# Patient Record
Sex: FEMALE | Race: WHITE | Employment: PART TIME | URBAN - METROPOLITAN AREA
[De-identification: names, ages, dates, MRNs, and addresses within clinical notes are randomized per-mention and may not be internally consistent; named-entity substitution may affect disease eponyms.]

---

## 2017-03-06 ENCOUNTER — TELEPHONE (OUTPATIENT)
Dept: FAMILY MEDICINE | Facility: CLINIC | Age: 72
End: 2017-03-06

## 2017-03-06 DIAGNOSIS — N95.2 POSTMENOPAUSAL ATROPHIC VAGINITIS: ICD-10-CM

## 2017-03-06 DIAGNOSIS — N95.1 SYMPTOMATIC MENOPAUSAL OR FEMALE CLIMACTERIC STATES: ICD-10-CM

## 2017-03-06 DIAGNOSIS — N95.1 SYMPTOMATIC MENOPAUSAL OR FEMALE CLIMACTERIC STATES: Primary | ICD-10-CM

## 2017-03-07 NOTE — TELEPHONE ENCOUNTER
Patient's insurance does not cover estradiol 0.5 mg patch .  They will cover estrace vaginal cream

## 2017-03-09 NOTE — TELEPHONE ENCOUNTER
This is to treat menopausal hot flashes, not atrophic vaginitis. What systemic estrogen will they cover? (does not have to be patch, could be oral)

## 2017-03-10 RX ORDER — ESTRADIOL 10 UG/1
10 INSERT VAGINAL
Qty: 8 TABLET | Refills: 11 | Status: SHIPPED | OUTPATIENT
Start: 2017-03-13 | End: 2017-03-21

## 2017-03-10 NOTE — TELEPHONE ENCOUNTER
Please notify the patient that her insurance will not cover any of the estrogen patches. I sent a prescription to [facility] for a vaginal tablet to be used twice weekly

## 2017-03-10 NOTE — TELEPHONE ENCOUNTER
[facility]'s formulary lists the following ([email or web address])  Highlighted medications appear to be the only tablet medications that do not require PA's.     ESTROGENS  ESTRACE VAGINAL CREAM 0.01 % (0.1 MG/GRAM) 3  estradiol oral tablet 0.5 mg, 1 mg, 2 mg tier 2 PA  estradiol transdermal patch semiweekly 0.025 mg/24 hr, 0.0375 mg/24 hr, 0.05 mg/24 hr, 0.075 mg/24 hr, 0.1 mg/24 hr tier 2 PA; QLL (8 EA per 28 days)  estradiol transdermal patch weekly0.025 mg/24 hr, 0.0375 mg/24 hr, 0.05 mg/24 hr, 0.06 mg/24 hr, 0.075 mg/24 hr, 0.1 mg/24 hr tier 2 PA; QLL (4 EA per 28 days)  estradiol valerate intramuscular oil 20 mg/ml tier 2   marlissa oral tablet 0.15-0.03 mg tier 2  MENEST ORAL TABLET 0.3 MG, 0.625 MG,1.25 MG tier 3 PA  yuvafem vaginal tablet 10 mcg tier 2

## 2017-03-21 RX ORDER — ESTRADIOL 0.05 MG/D
1 PATCH, EXTENDED RELEASE TRANSDERMAL WEEKLY
Qty: 12 PATCH | Refills: 3 | Status: SHIPPED | OUTPATIENT
Start: 2017-03-21 | End: 2018-10-30 | Stop reason: ALTCHOICE

## 2017-03-21 NOTE — TELEPHONE ENCOUNTER
Pt calling stating she doesn't want the vaginal tablets because they cost $250/month. She would like to go back to the estrodiol patches.    Please advise.    Karin Lane  Clinic Station

## 2017-04-13 ENCOUNTER — OFFICE VISIT (OUTPATIENT)
Dept: FAMILY MEDICINE | Facility: CLINIC | Age: 72
End: 2017-04-13
Payer: COMMERCIAL

## 2017-04-13 VITALS — SYSTOLIC BLOOD PRESSURE: 120 MMHG | HEART RATE: 64 BPM | DIASTOLIC BLOOD PRESSURE: 62 MMHG

## 2017-04-13 DIAGNOSIS — M17.11 PRIMARY OSTEOARTHRITIS OF RIGHT KNEE: Primary | ICD-10-CM

## 2017-04-13 PROCEDURE — 20610 DRAIN/INJ JOINT/BURSA W/O US: CPT | Performed by: FAMILY MEDICINE

## 2017-04-13 RX ORDER — TRIAMCINOLONE ACETONIDE 40 MG/ML
40 INJECTION, SUSPENSION INTRA-ARTICULAR; INTRAMUSCULAR ONCE
Qty: 1 ML | Refills: 0 | OUTPATIENT
Start: 2017-04-13 | End: 2017-04-13

## 2017-04-13 NOTE — MR AVS SNAPSHOT
"              After Visit Summary   2017    Camelia Bangura    MRN: [id number]           Patient Information     Date Of Birth          1945        Visit Information        Provider Department      2017 9:00 AM CORNEL Casas MD [facility]        Care Instructions      This could take up to one week to achieve the full benefit, could be repeated in a month, but generally no more than 4 times in one year          Follow-ups after your visit        Who to contact     If you have questions or need follow up information about today's clinic visit or your schedule please contact [facility] directly at [phone].  Normal or non-critical lab and imaging results will be communicated to you by MyChart, letter or phone within 4 business days after the clinic has received the results. If you do not hear from us within 7 days, please contact the clinic through MyChart or phone. If you have a critical or abnormal lab result, we will notify you by phone as soon as possible.  Submit refill requests through [employer] or call your pharmacy and they will forward the refill request to us. Please allow 3 business days for your refill to be completed.          Additional Information About Your Visit        MyChart Information     [employer] lets you send messages to your doctor, view your test results, renew your prescriptions, schedule appointments and more. To sign up, go to [email or web address] . Click on \"Log in\" on the left side of the screen, which will take you to the Welcome page. Then click on \"Sign up Now\" on the right side of the page.     You will be asked to enter the access code listed below, as well as some personal information. Please follow the directions to create your username and password.     Your access code is: [email or web address]  Expires: 2017  9:31 AM     Your access code will  in 90 days. If you need help or a new code, please call your [place] " clinic or [phone].        Care EveryWhere ID     This is your Care EveryWhere ID. This could be used by other organizations to access your [place] medical records  [id number]        Your Vitals Were     Pulse                   64            Blood Pressure from Last 3 Encounters:   04/13/17 120/62   09/08/16 122/68   06/07/16 115/71    Weight from Last 3 Encounters:   12/10/13 167 lb (75.8 kg)   09/12/12 162 lb (73.5 kg)   05/07/12 162 lb 6.4 oz (73.7 kg)              Today, you had the following     No orders found for display       Primary Care Provider Office Phone # Fax #    R Darryn Casas [phone]       [address] [address]        Thank you!     Thank you for choosing [facility]  for your care. Our goal is always to provide you with excellent care. Hearing back from our patients is one way we can continue to improve our services. Please take a few minutes to complete the written survey that you may receive in the mail after your visit with us. Thank you!             Your Updated Medication List - Protect others around you: Learn how to safely use, store and throw away your medicines at www.disposemymeds.org.          This list is accurate as of: 4/13/17  9:31 AM.  Always use your most recent med list.                   Brand Name Dispense Instructions for use    estradiol 0.05 MG/24HR BIW patch    VIVELLE-DOT    12 patch    Place 1 patch onto the skin once a week       fluconazole 150 MG tablet    DIFLUCAN    4 tablet    Take 1 tablet (150 mg) by mouth every 3 days       fluticasone 50 MCG/ACT spray    FLONASE    48 g    Spray 1-2 sprays into both nostrils daily       IBUPROFEN PO      Take 200 mg by mouth 2 tablets prn       minocycline 50 MG capsule    MINOCIN/DYNACIN    180 capsule    Take 1 capsule (50 mg) by mouth 2 times daily       simvastatin 40 MG tablet    ZOCOR    90 tablet    Take 1 tablet (40 mg) by mouth At  Bedtime       triamcinolone 0.1 % cream    KENALOG    30 g    Apply  topically 2 times daily.

## 2017-04-13 NOTE — NURSING NOTE
"Chief Complaint   Patient presents with     Knee Pain     right knee pain X ongoing. 2 weeks ago pt. moved wrong. injury when she was 14 years old and then again in college. pt. states it has been good for a long time.        Initial /62 (BP Location: Right arm, Patient Position: Chair, Cuff Size: Adult Regular)  Pulse 64 Estimated body mass index is 31.55 kg/(m^2) as calculated from the following:    Height as of 12/10/13: 5' 1\" (1.549 m).    Weight as of 12/10/13: 167 lb (75.8 kg).  Medication Reconciliation: complete     Afia Bass, CMA      "

## 2017-04-13 NOTE — PATIENT INSTRUCTIONS
This could take up to one week to achieve the full benefit, could be repeated in a month, but generally no more than 4 times in one year

## 2017-04-13 NOTE — PROGRESS NOTES
Subjective:  Camelia Bangura is a 72 year old female   Chief Complaint   Patient presents with     Knee Pain     right knee pain X ongoing. 2 weeks ago pt. moved wrong. injury when she was 14 years old and then again in college. pt. states it has been good for a long time.      Health Maintenance     pt. is due for mammgram pt. is aware.     She just turned and twisted the knee slightly and felt a sharp pain on the medial aspect of the knee joint. Since then it has been quite sore and painful with walking. She was in [facility] last week and doing quite a bit of walking and had some benefit from ibuprofen but was pretty uncomfortable. It has had swelling from time to time        Encounter Diagnosis   Name Primary?     Primary osteoarthritis of right knee Yes           Medical, surgical, social, and family histories, medications and allergies reviewed and updated.    Objective:  Exam:    GENERAL APPEARANCE ADULT: Alert, no acute distress  EYES: PERRL, EOM normal, conjunctiva and lids normal  MS: Right knee: The patella has a somewhat irregular surface. There is tenderness at both the medial and lateral joint line and some crepitation with range of motion; medial and collateral ligaments are intact to stressing, Lachman's test negative      ASSESSMENT:  1. Primary osteoarthritis of right knee    Discussed pathophysiology of this condition and implications.  Questions answered. Discussed the rationale for trying a steroid injection    PLAN:  Orders Placed This Encounter     DRAIN/INJECT LARGE JOINT/BURSA     KENALOG PER 10 MG     IBUPROFEN PO     triamcinolone acetonide (KENALOG-40) 40 MG/ML injection     After informed consent, the right knee was prepped with Hibiclens and injected using a medial approach with 5 cc of 1/2% bupivacaine and 40 mg of Kenalog. This was well tolerated.       Patient Instructions     This could take up to one week to achieve the full benefit, could be repeated in a month, but  generally no more than 4 times in one year

## 2017-08-22 ENCOUNTER — TELEPHONE (OUTPATIENT)
Dept: FAMILY MEDICINE | Facility: CLINIC | Age: 72
End: 2017-08-22

## 2017-08-22 NOTE — TELEPHONE ENCOUNTER
Panel Management Review      Patient has the following on her problem list: None      Composite cancer screening  Chart review shows that this patient is due/due soon for the following Mammogram  Summary:    Patient is due/failing the following:   MAMMOGRAM    Action needed:   Patient needs referral/order: mammogram    Type of outreach:    Phone, spoke to patient.  scheduled patient for mammogram    Questions for provider review:    None                                                                                                                                    Valery Basurto MA       Chart routed to Care Team .

## 2017-08-23 ENCOUNTER — HOSPITAL ENCOUNTER (OUTPATIENT)
Dept: MAMMOGRAPHY | Facility: CLINIC | Age: 72
Discharge: HOME | End: 2017-08-23
Attending: FAMILY MEDICINE | Admitting: FAMILY MEDICINE
Payer: COMMERCIAL

## 2017-08-23 DIAGNOSIS — Z12.31 VISIT FOR SCREENING MAMMOGRAM: ICD-10-CM

## 2017-08-23 PROCEDURE — G0202 SCR MAMMO BI INCL CAD: HCPCS

## 2017-10-03 DIAGNOSIS — E78.5 HYPERLIPIDEMIA LDL GOAL <130: ICD-10-CM

## 2017-10-03 NOTE — TELEPHONE ENCOUNTER
simvastatin     Last Written Prescription Date: 09/08/2016  Last Fill Quantity: 90, # refills: 3  Last Office Visit with [facility], P or [facility] prescribing provider: 04/13/2017       Lab Results   Component Value Date    CHOL 132 09/08/2016     Lab Results   Component Value Date    HDL 53 09/08/2016     Lab Results   Component Value Date    LDL 56 09/08/2016     Lab Results   Component Value Date    TRIG 114 09/08/2016     Lab Results   Component Value Date    CHOLHDLRATIO 3.2 12/29/2014       Pablo WeberR)

## 2017-10-05 RX ORDER — SIMVASTATIN 40 MG
40 TABLET ORAL AT BEDTIME
Qty: 30 TABLET | Refills: 0 | Status: SHIPPED | OUTPATIENT
Start: 2017-10-05 | End: 2018-01-09

## 2017-11-07 ENCOUNTER — ALLIED HEALTH/NURSE VISIT (OUTPATIENT)
Dept: FAMILY MEDICINE | Facility: CLINIC | Age: 72
End: 2017-11-07
Payer: COMMERCIAL

## 2017-11-07 DIAGNOSIS — Z23 NEED FOR PROPHYLACTIC VACCINATION AND INOCULATION AGAINST INFLUENZA: Primary | ICD-10-CM

## 2017-11-07 PROCEDURE — G0008 ADMIN INFLUENZA VIRUS VAC: HCPCS

## 2017-11-07 PROCEDURE — 99207 ZZC NO CHARGE NURSE ONLY: CPT

## 2017-11-07 PROCEDURE — 90662 IIV NO PRSV INCREASED AG IM: CPT

## 2017-11-07 NOTE — PROGRESS NOTES

## 2018-01-09 ENCOUNTER — TELEPHONE (OUTPATIENT)
Dept: FAMILY MEDICINE | Facility: CLINIC | Age: 73
End: 2018-01-09

## 2018-01-09 DIAGNOSIS — E78.5 HYPERLIPIDEMIA LDL GOAL <130: Primary | ICD-10-CM

## 2018-01-09 DIAGNOSIS — E78.5 HYPERLIPIDEMIA LDL GOAL <130: ICD-10-CM

## 2018-01-09 RX ORDER — SIMVASTATIN 40 MG
TABLET ORAL
Qty: 30 TABLET | Refills: 0 | Status: SHIPPED | OUTPATIENT
Start: 2018-01-09 | End: 2018-02-07

## 2018-01-09 NOTE — TELEPHONE ENCOUNTER
"Requested Prescriptions   Pending Prescriptions Disp Refills     simvastatin (ZOCOR) 40 MG tablet [Pharmacy Med Name: SIMVASTATIN 40MG TAB]  Last Written Prescription Date:  10/05/2017  Last Fill Quantity: 30,  # refills: 0   Last Office Visit with G, P or [facility] prescribing provider:  04/13/2017   Future Office Visit:      90 tablet      Sig: TAKE ONE TABLET AT       BEDTIME FOR CHOLESTEROL    Statins Protocol Failed    1/9/2018  2:01 PM       Failed - LDL on file in past 12 months    Recent Labs   Lab Test  09/08/16   0856   LDL  56            Passed - No abnormal creatine kinase in past 12 months    No lab results found.         Passed - Recent or future visit with authorizing provider    Patient had office visit in the last year or has a visit in the next 30 days with authorizing provider.  See \"Patient Info\" tab in inbasket, or \"Choose Columns\" in Meds & Orders section of the refill encounter.              Passed - Patient is age 18 or older       Passed - No active pregnancy on record       Passed - No positive pregnancy test in past 12 months        Pablo Ash RT (R)    "

## 2018-01-09 NOTE — TELEPHONE ENCOUNTER
Reason for Call:  Other orders    Detailed comments: Would like a lipid panel done please.    Phone Number Patient can be reached at: Cell number on file:    Telephone Information:   Mobile [phone]       Best Time: any    Can we leave a detailed message on this number? YES    Call taken on 1/9/2018 at 3:05 PM by Ofelia Ibrahim

## 2018-02-07 ENCOUNTER — ALLIED HEALTH/NURSE VISIT (OUTPATIENT)
Dept: FAMILY MEDICINE | Facility: CLINIC | Age: 73
End: 2018-02-07
Payer: COMMERCIAL

## 2018-02-07 VITALS — SYSTOLIC BLOOD PRESSURE: 123 MMHG | OXYGEN SATURATION: 98 % | DIASTOLIC BLOOD PRESSURE: 64 MMHG | HEART RATE: 73 BPM

## 2018-02-07 DIAGNOSIS — K30 INDIGESTION: Primary | ICD-10-CM

## 2018-02-07 DIAGNOSIS — E78.5 HYPERLIPIDEMIA LDL GOAL <130: ICD-10-CM

## 2018-02-07 LAB
CHOLEST SERPL-MCNC: 151 MG/DL
HDLC SERPL-MCNC: 54 MG/DL
LDLC SERPL CALC-MCNC: 66 MG/DL
NONHDLC SERPL-MCNC: 97 MG/DL
TRIGL SERPL-MCNC: 157 MG/DL

## 2018-02-07 PROCEDURE — 80061 LIPID PANEL: CPT | Performed by: FAMILY MEDICINE

## 2018-02-07 PROCEDURE — 36415 COLL VENOUS BLD VENIPUNCTURE: CPT | Performed by: FAMILY MEDICINE

## 2018-02-07 PROCEDURE — 99207 ZZC NO CHARGE NURSE ONLY: CPT

## 2018-02-07 RX ORDER — SIMVASTATIN 40 MG
TABLET ORAL
Qty: 90 TABLET | Refills: 3 | Status: SHIPPED | OUTPATIENT
Start: 2018-02-07 | End: 2018-10-30

## 2018-02-07 NOTE — MR AVS SNAPSHOT
"              After Visit Summary   2/7/2018    Camelia Bangura    MRN: [id number]           Patient Information     Date Of Birth          1945        Visit Information        Provider Department      2/7/2018 10:00 AM DENISHA MAZARIEGOS/PAWAN CURTIS [facility]        Today's Diagnoses     Indigestion    -  1       Follow-ups after your visit        Your next 10 appointments already scheduled     Feb 08, 2018  1:40 PM CST   SHORT with CORNEL Casas MD   [facility]    [address]  [facility] [id number] [phone]              Who to contact     If you have questions or need follow up information about today's clinic visit or your schedule please contact [facility] directly at [phone].  Normal or non-critical lab and imaging results will be communicated to you by MyChart, letter or phone within 4 business days after the clinic has received the results. If you do not hear from us within 7 days, please contact the clinic through MyChart or phone. If you have a critical or abnormal lab result, we will notify you by phone as soon as possible.  Submit refill requests through [employer] or call your pharmacy and they will forward the refill request to us. Please allow 3 business days for your refill to be completed.          Additional Information About Your Visit        MyChart Information     [employer] lets you send messages to your doctor, view your test results, renew your prescriptions, schedule appointments and more. To sign up, go to [email or web address] . Click on \"Log in\" on the left side of the screen, which will take you to the Welcome page. Then click on \"Sign up Now\" on the right side of the page.     You will be asked to enter the access code listed below, as well as some personal information. Please follow the directions to create your username and password.     Your access code is: [email or web address]  Expires: 5/8/2018 10:18 " AM     Your access code will  in 90 days. If you need help or a new code, please call your [place] clinic or [phone].        Care EveryWhere ID     This is your Care EveryWhere ID. This could be used by other organizations to access your [place] medical records  [id number]        Your Vitals Were     Pulse Pulse Oximetry                73 98%           Blood Pressure from Last 3 Encounters:   18 123/64   17 120/62   16 122/68    Weight from Last 3 Encounters:   12/10/13 167 lb (75.8 kg)   12 162 lb (73.5 kg)   12 162 lb 6.4 oz (73.7 kg)              Today, you had the following     No orders found for display       Primary Care Provider Office Phone # Fax #    R Darryn Casas [phone] [address] [facility] [address]        Equal Access to Services     SHAKIR DE DIOS : Hadii aad ku hadasho Soomaali, waaxda luqadaha, qaybta kaalmada adeegyada, waxharvinder masin roycen rosemary quiros . So [facility] [phone].    ATENCIÓN: Si habla español, tiene a jose disposición servicios gratuitos de asistencia lingüística. Llame al [phone].    We comply with applicable federal civil rights laws and [place] laws. We do not discriminate on the basis of race, color, national origin, age, disability, sex, sexual orientation, or gender identity.            Thank you!     Thank you for choosing [facility]  for your care. Our goal is always to provide you with excellent care. Hearing back from our patients is one way we can continue to improve our services. Please take a few minutes to complete the written survey that you may receive in the mail after your visit with us. Thank you!             Your Updated Medication List - Protect others around you: Learn how to safely use, store and throw away your medicines at www.disposemymeds.org.          This list is accurate as of 18 10:18 AM.  Always use your most recent med list.                    Brand Name Dispense Instructions for use Diagnosis    estradiol 0.05 MG/24HR BIW patch    VIVELLE-DOT    12 patch    Place 1 patch onto the skin once a week    Symptomatic menopausal or female climacteric states       fluconazole 150 MG tablet    DIFLUCAN    4 tablet    Take 1 tablet (150 mg) by mouth every 3 days    Candidiasis of vulva and vagina       fluticasone 50 MCG/ACT spray    FLONASE    48 g    Spray 1-2 sprays into both nostrils daily    Chronic rhinitis       IBUPROFEN PO      Take 200 mg by mouth 2 tablets prn        minocycline 50 MG capsule    MINOCIN/DYNACIN    180 capsule    Take 1 capsule (50 mg) by mouth 2 times daily    Perioral dermatitis       simvastatin 40 MG tablet    ZOCOR    30 tablet    TAKE ONE TABLET AT       BEDTIME FOR CHOLESTEROL    Hyperlipidemia LDL goal <130       triamcinolone 0.1 % cream    KENALOG    30 g    Apply  topically 2 times daily.    Eczema of both hands

## 2018-02-07 NOTE — NURSING NOTE
"Patient with c/o \"her guts are gurgling\".  Was fasting for labs.  Denies soa, dizziness, shakiness, cp. Last BM today.   To try something like tums, eat and maybe pepto bismo. Yodit MALDONADO RN    "

## 2018-02-07 NOTE — LETTER
[facility]  [address]  [facility] [address]  Phone: [phone]      2/8/2018     Camelia Bangura  [address] [facility] [address]      Dear Camelia:    Thank you for allowing me to participate in your care. Your recent test results were reviewed and listed below.      Your lab results are normal, continue current medications and cares. I will send refills on your cholesterol meds.   Results for orders placed or performed in visit on 02/07/18   Lipid panel reflex to direct LDL Fasting   Result Value Ref Range    Cholesterol 151 <200 mg/dL    Triglycerides 157 (H) <150 mg/dL    HDL Cholesterol 54 >49 mg/dL    LDL Cholesterol Calculated 66 <100 mg/dL    Non HDL Cholesterol 97 <130 mg/dL        Thank you for choosing [facility]. As a result, please continue with the treatment plan discussed in the office. Return as discussed or sooner if symptoms worsen or fail to improve. If you have any further questions or concerns, please do not hesitate to contact us.      Sincerely,        Dr. Darryn Casas/Tamar Curry CMA

## 2018-02-08 NOTE — PROGRESS NOTES
Please SEND LETTER    Notify patient of acceptable results. Continue current medications and cares. I will send refills on your cholesterol meds

## 2018-03-14 ENCOUNTER — TELEPHONE (OUTPATIENT)
Dept: FAMILY MEDICINE | Facility: CLINIC | Age: 73
End: 2018-03-14

## 2018-03-14 DIAGNOSIS — Z12.11 SPECIAL SCREENING FOR MALIGNANT NEOPLASMS, COLON: Primary | ICD-10-CM

## 2018-03-14 NOTE — TELEPHONE ENCOUNTER
Panel Management Review      Patient has the following on her problem list: None      Composite cancer screening  Chart review shows that this patient is due/due soon for the following colon screening  Summary:    Patient is due/failing the following:   Colon screening     Action needed:   Patient needs referral/order: colon/fit    Type of outreach:    Phone, spoke to patient.  agreed to fit    Questions for provider review:    None                                                                                                                                    Valery Basurto MA       Chart routed to Care Team .

## 2018-03-21 PROCEDURE — 82274 ASSAY TEST FOR BLOOD FECAL: CPT | Performed by: FAMILY MEDICINE

## 2018-03-25 LAB — HEMOCCULT STL QL IA: NEGATIVE

## 2018-03-26 DIAGNOSIS — Z12.11 SPECIAL SCREENING FOR MALIGNANT NEOPLASMS, COLON: ICD-10-CM

## 2018-03-26 NOTE — LETTER
[facility]  [address]  [facility] [address]  Phone: [phone]      3/26/2018     Camelia Bangura  [address] [facility] [address]      Dear Camelia:    Thank you for allowing me to participate in your care. Your recent test results were reviewed and listed below.  NORMAL results. Please repeat this test in one year    Your results are provided below for your review  Results for orders placed or performed in visit on 03/26/18   Fecal colorectal cancer screen (FIT)   Result Value Ref Range    Occult Blood Scn FIT Negative NEG^Negative                 Thank you for choosing [facility]. As a result, please continue with the treatment plan discussed in the office. Return as discussed or sooner if symptoms worsen or fail to improve. If you have any further questions or concerns, please do not hesitate to contact us.      Sincerely,        Dr. Darryn Casas

## 2018-04-09 DIAGNOSIS — N95.1 SYMPTOMATIC MENOPAUSAL OR FEMALE CLIMACTERIC STATES: Primary | ICD-10-CM

## 2018-04-09 NOTE — TELEPHONE ENCOUNTER
"Requested Prescriptions   Pending Prescriptions Disp Refills     estradiol (CLIMARA) 0.05 MG/24HR WK patch [Pharmacy Med Name: ESTRADIOL 0.05MG DIS]  Last Written Prescription Date:  03/21/2017  Last Fill Quantity: 12,  # refills: 3   Last office visit: 04/13/2017 with prescribing provider:  Post   Future Office Visit:     4 patch 8     Sig: APPLY ONE PATCH TO SKIN  ONCE A WEEK    Hormone Replacement Therapy Passed    4/9/2018 12:29 PM       Passed - Blood pressure under 140/90 in past 12 months    BP Readings from Last 3 Encounters:   02/07/18 123/64   04/13/17 120/62   09/08/16 122/68                Passed - Recent (12 mo) or future (30 days) visit within the authorizing provider's specialty    Patient had office visit in the last 12 months or has a visit in the next 30 days with authorizing provider or within the authorizing provider's specialty.  See \"Patient Info\" tab in inbasket, or \"Choose Columns\" in Meds & Orders section of the refill encounter.           Passed - Patient has mammogram in past 2 years on file if age 50-75       Passed - Patient is 18 years of age or older       Passed - No active pregnancy on record       Passed - No positive pregnancy test on record in past 12 months        Pablo Ash RT (R)    "

## 2018-04-10 ENCOUNTER — TELEPHONE (OUTPATIENT)
Dept: FAMILY MEDICINE | Facility: CLINIC | Age: 73
End: 2018-04-10

## 2018-04-11 RX ORDER — ESTRADIOL 0.05 MG/D
PATCH TRANSDERMAL
Qty: 4 PATCH | Refills: 8 | Status: SHIPPED | OUTPATIENT
Start: 2018-04-11 | End: 2018-10-30

## 2018-04-11 NOTE — TELEPHONE ENCOUNTER
**This refill requires provider completion and is not appropriate for RN review per RN refill guidelines.**  Please associate appropriate diagnosis to this medication.  Harriett King RN

## 2018-04-11 NOTE — TELEPHONE ENCOUNTER
PA submitted to [facility] PA POOL 4/10/18 by Trang Valdez    Prior Authorization Retail Medication Request    Medication/Dose: Estradiol TDS 0.05mg/day  ICD code (if different than what is on RX):    Previously Tried and Failed:  Climara patch  Rationale:  Patient requested patch and has paid out of pocket before;  Has used since 11/2010    Insurance Name:  [facility]  Insurance ID:  [id number]   Temporary Fill letter received      Pharmacy Information (if different than what is on RX)  Name:    Phone:

## 2018-04-16 NOTE — TELEPHONE ENCOUNTER
PA Initiation    Medication: estradiol tds   Insurance Company: [employer] - Phone [phone] Fax [phone]  Pharmacy Filling the Rx: [facility] - [place] - [address]  Filling Pharmacy Phone: [phone]  Filling Pharmacy Fax:    Start Date: 4/16/2018    Central Prior Authorization Team   Phone: [phone]    Temporary Fill letter received by Clinic        Awaiting additional questions via CMM

## 2018-04-17 NOTE — TELEPHONE ENCOUNTER
Prior Authorization Approval    Authorization Effective Date: 3/17/2018  Authorization Expiration Date: 4/16/2019  Medication: estradiol tds   Approved Dose/Quantity: 4/28 days  Reference #: [id number]   Insurance Company: [employer] PAULA - Phone [phone] Fax [phone]  Expected CoPay: n/a per pharmacist she has a deductible not met yet, they will call her once it is ready     Which Pharmacy is filling the prescription (Not needed for infusion/clinic administered): [facility] - [place], [place] - [address] [place] [address]  Pharmacy Notified: Yes  Patient Notified: Yes

## 2018-04-26 ENCOUNTER — TELEPHONE (OUTPATIENT)
Dept: FAMILY MEDICINE | Facility: CLINIC | Age: 73
End: 2018-04-26

## 2018-04-26 NOTE — TELEPHONE ENCOUNTER
Reason for Call:  ears    Detailed comments: patient is calling stating that her left ear she can hardly hear from that ear, and the right one is not much better . She did mention something about the last time she saw Dr. Casas  that she should get her ears flushed. Please call to advise    Phone Number Patient can be reached at: Cell number on file:    Telephone Information:   Mobile [phone]     Best Time: any    Can we leave a detailed message on this number? YES   Bharati Velazquez  Clinic Station [place] [facility]      Call taken on 4/26/2018 at 10:59 AM by Bharati Velazquez

## 2018-04-27 ENCOUNTER — OFFICE VISIT (OUTPATIENT)
Dept: FAMILY MEDICINE | Facility: CLINIC | Age: 73
End: 2018-04-27
Payer: COMMERCIAL

## 2018-04-27 VITALS — SYSTOLIC BLOOD PRESSURE: 134 MMHG | TEMPERATURE: 98.2 F | HEART RATE: 96 BPM | DIASTOLIC BLOOD PRESSURE: 56 MMHG

## 2018-04-27 DIAGNOSIS — H61.23 EXCESSIVE EAR WAX, BILATERAL: Primary | ICD-10-CM

## 2018-04-27 PROCEDURE — 69209 REMOVE IMPACTED EAR WAX UNI: CPT | Mod: 50 | Performed by: FAMILY MEDICINE

## 2018-04-27 NOTE — NURSING NOTE
"Initial /56 (BP Location: Right leg, Patient Position: Chair, Cuff Size: Adult Large)  Pulse 96  Temp 98.2  F (36.8  C) (Tympanic) Estimated body mass index is 31.55 kg/(m^2) as calculated from the following:    Height as of 12/10/13: 5' 1\" (1.549 m).    Weight as of 12/10/13: 167 lb (75.8 kg). .    Eloise Anne    "

## 2018-04-27 NOTE — MR AVS SNAPSHOT
"              After Visit Summary   4/27/2018    Camelia Bangura    MRN: [id number]           Patient Information     Date Of Birth          1945        Visit Information        Provider Department      4/27/2018 9:40 AM Tracey Pitts MD [facility]         Follow-ups after your visit        Your next 10 appointments already scheduled     May 10, 2018  9:20 AM CDT   SHORT with CORNEL Casas MD   [facility] ([facility])    [id number]  [facility] [id number] [phone]              Who to contact     If you have questions or need follow up information about today's clinic visit or your schedule please contact [facility] directly at [phone].  Normal or non-critical lab and imaging results will be communicated to you by MyChart, letter or phone within 4 business days after the clinic has received the results. If you do not hear from us within 7 days, please contact the clinic through MyChart or phone. If you have a critical or abnormal lab result, we will notify you by phone as soon as possible.  Submit refill requests through [employer] or call your pharmacy and they will forward the refill request to us. Please allow 3 business days for your refill to be completed.          Additional Information About Your Visit        MyChart Information     RoboDynamics lets you send messages to your doctor, view your test results, renew your prescriptions, schedule appointments and more. To sign up, go to [email or web address] . Click on \"Log in\" on the left side of the screen, which will take you to the Welcome page. Then click on \"Sign up Now\" on the right side of the page.     You will be asked to enter the access code listed below, as well as some personal information. Please follow the directions to create your username and password.     Your access code is: [email or web address]  Expires: 5/8/2018 11:18 AM     Your access code will "  in 90 days. If you need help or a new code, please call your [place] clinic or [phone].        Care EveryWhere ID     This is your Care EveryWhere ID. This could be used by other organizations to access your [place] medical records  [id number]        Your Vitals Were     Pulse Temperature                96 98.2  F (36.8  C) (Tympanic)           Blood Pressure from Last 3 Encounters:   18 134/56   18 123/64   17 120/62    Weight from Last 3 Encounters:   12/10/13 167 lb (75.8 kg)   12 162 lb (73.5 kg)   12 162 lb 6.4 oz (73.7 kg)              Today, you had the following     No orders found for display       Primary Care Provider Office Phone # Fax #    R Darryn Casas [phone] [address] [address]        Equal Access to Services     SHAKIR DE DIOS AH: Hadii aad ku hadasho Soomaali, waaxda luqadaha, qaybta kaalmada adeegyada, waxay tgin hayyocastan rosemary quiros . So [facility] [phone].    ATENCIÓN: Si lamla espwilma, tiene a jose disposición servicios gratuitos de asistencia lingüística. Llame al [phone].    We comply with applicable federal civil rights laws and [place] laws. We do not discriminate on the basis of race, color, national origin, age, disability, sex, sexual orientation, or gender identity.            Thank you!     Thank you for choosing [facility]  for your care. Our goal is always to provide you with excellent care. Hearing back from our patients is one way we can continue to improve our services. Please take a few minutes to complete the written survey that you may receive in the mail after your visit with us. Thank you!             Your Updated Medication List - Protect others around you: Learn how to safely use, store and throw away your medicines at www.disposemymeds.org.          This list is accurate as of 18 10:29 AM.  Always use your most recent med list.                   Brand  Name Dispense Instructions for use Diagnosis    * estradiol 0.05 MG/24HR BIW patch    VIVELLE-DOT    12 patch    Place 1 patch onto the skin once a week    Symptomatic menopausal or female climacteric states       * estradiol 0.05 MG/24HR WK patch    CLIMARA    4 patch    APPLY ONE PATCH TO SKIN  ONCE A WEEK    Symptomatic menopausal or female climacteric states       fluconazole 150 MG tablet    DIFLUCAN    4 tablet    Take 1 tablet (150 mg) by mouth every 3 days    Candidiasis of vulva and vagina       fluticasone 50 MCG/ACT spray    FLONASE    48 g    Spray 1-2 sprays into both nostrils daily    Chronic rhinitis       IBUPROFEN PO      Take 200 mg by mouth 2 tablets prn        minocycline 50 MG capsule    MINOCIN/DYNACIN    180 capsule    Take 1 capsule (50 mg) by mouth 2 times daily    Perioral dermatitis       simvastatin 40 MG tablet    ZOCOR    90 tablet    TAKE ONE TABLET AT       BEDTIME FOR CHOLESTEROL    Hyperlipidemia LDL goal <130       triamcinolone 0.1 % cream    KENALOG    30 g    Apply  topically 2 times daily.    Eczema of both hands       * Notice:  This list has 2 medication(s) that are the same as other medications prescribed for you. Read the directions carefully, and ask your doctor or other care provider to review them with you.

## 2018-04-27 NOTE — PROGRESS NOTES
SUBJECTIVE:   Camelia Bangura is a 73 year old female who presents to clinic today for the following health issues:        ENT Symptoms             Symptoms: cc Present Absent Comment   Fever/Chills   x    Fatigue   x    Muscle Aches   x    Eye Irritation   x    Sneezing   x    Nasal Sharath/Drg   x    Sinus Pressure/Pain   x    Loss of smell   x    Dental pain   x    Sore Throat   x    Swollen Glands   x    Ear Pain/Fullness  x  plugged   Cough   x    Wheeze   x    Chest Pain   x    Shortness of breath   x    Rash   x    Other   x      Symptom duration:  one week or more   Symptom severity:  moderate   Treatments tried:  Debrox   Contacts:   none         Patient is a 73 yr old female here for plugged ears. Has had this for a couple of weeks. Reports no pain but hearing has reduced. Builds up wax easily. No other symptoms. No dizziness or headaches.     Problem list and histories reviewed & adjusted, as indicated.  Additional history: as documented    Patient Active Problem List   Diagnosis     Dysplasia of cervix     Symptomatic menopausal or female climacteric states     Postmenopausal atrophic vaginitis     Candidiasis of vulva and vagina     HYPERLIPIDEMIA LDL GOAL <130     Chronic rhinitis     Health Care Home     Advanced directives, counseling/discussion     Eczema of both hands     Past Surgical History:   Procedure Laterality Date     C APPENDECTOMY       HC CORRECT BUNION,SIMPLE  05/23/90    Right foot     HC REMOVAL OF TONSILS,<13 Y/O       HYSTERECTOMY, PAP NO LONGER INDICATED  4/05     HYSTERECTOMY, VAGINAL  4/05     SURGICAL HISTORY OF -   06/19/95    Bunion deformity & fractured tibial sesamoid left foot       Social History   Substance Use Topics     Smoking status: Never Smoker     Smokeless tobacco: Never Used     Alcohol use Yes      Comment: occasional wine     Family History   Problem Relation Age of Onset     CEREBROVASCULAR DISEASE Mother      Arthritis Mother      OSTEOPOROSIS Mother       Melanoma No family hx of          Current Outpatient Prescriptions   Medication Sig Dispense Refill     estradiol (CLIMARA) 0.05 MG/24HR WK patch APPLY ONE PATCH TO SKIN  ONCE A WEEK 4 patch 8     estradiol (VIVELLE-DOT) 0.05 MG/24HR BIW patch Place 1 patch onto the skin once a week 12 patch 3     simvastatin (ZOCOR) 40 MG tablet TAKE ONE TABLET AT       BEDTIME FOR CHOLESTEROL 90 tablet 3     fluconazole (DIFLUCAN) 150 MG tablet Take 1 tablet (150 mg) by mouth every 3 days (Patient not taking: Reported on 4/27/2018) 4 tablet 6     fluticasone (FLONASE) 50 MCG/ACT nasal spray Spray 1-2 sprays into both nostrils daily (Patient not taking: Reported on 4/27/2018) 48 g 0     IBUPROFEN PO Take 200 mg by mouth 2 tablets prn       minocycline (MINOCIN,DYNACIN) 50 MG capsule Take 1 capsule (50 mg) by mouth 2 times daily (Patient not taking: Reported on 4/27/2018) 180 capsule 3     triamcinolone (KENALOG) 0.1 % cream Apply  topically 2 times daily. (Patient not taking: Reported on 4/27/2018) 30 g 11     Allergies   Allergen Reactions     Seasonal Allergies      BP Readings from Last 3 Encounters:   04/27/18 134/56   02/07/18 123/64   04/13/17 120/62    Wt Readings from Last 3 Encounters:   12/10/13 167 lb (75.8 kg)   09/12/12 162 lb (73.5 kg)   05/07/12 162 lb 6.4 oz (73.7 kg)                  Labs reviewed in EPIC    Reviewed and updated as needed this visit by clinical staff       Reviewed and updated as needed this visit by Provider         ROS:  Constitutional, HEENT, cardiovascular, pulmonary, gi and gu systems are negative, except as otherwise noted.    OBJECTIVE:     /56 (BP Location: Right leg, Patient Position: Chair, Cuff Size: Adult Large)  Pulse 96  Temp 98.2  F (36.8  C) (Tympanic)  There is no height or weight on file to calculate BMI.  GENERAL: healthy, alert and no distress  HENT: normal cephalic/atraumatic, both ears: occluded with wax, nose and mouth without ulcers or lesions, oropharynx clear and  oral mucous membranes moist    Diagnostic Test Results:  none     ASSESSMENT/PLAN:   1. Excessive ear wax, bilateral  Irrigated , no signs of infection. Recommend using debrox every couple of weeks.       FUTURE APPOINTMENTS:       - Follow-up visit as needed    Tracey Pitts MD  [facility]

## 2018-05-03 DIAGNOSIS — J31.0 CHRONIC RHINITIS: Primary | ICD-10-CM

## 2018-05-03 RX ORDER — FLUTICASONE PROPIONATE 50 MCG
1-2 SPRAY, SUSPENSION (ML) NASAL DAILY
Qty: 48 G | Refills: 0 | Status: SHIPPED | OUTPATIENT
Start: 2018-05-03 | End: 2018-10-30

## 2018-05-03 NOTE — TELEPHONE ENCOUNTER
Fluticasone  Last Written Prescription Date:  9/8/2016  Last Fill Quantity: 48 g,  # refills: 0   Last office visit: No previous visit found with prescribing provider:  4/27/2018 OA   Future Office Visit:

## 2018-05-11 NOTE — MR AVS SNAPSHOT
"              After Visit Summary   2017    Camelia Bangura    MRN: [id number]           Patient Information     Date Of Birth          1945        Visit Information        Provider Department      2017 11:45 AM [facility]        Today's Diagnoses     Need for prophylactic vaccination and inoculation against influenza    -  1       Follow-ups after your visit        Who to contact     If you have questions or need follow up information about today's clinic visit or your schedule please contact [facility] directly at [phone].  Normal or non-critical lab and imaging results will be communicated to you by [employer], letter or phone within 4 business days after the clinic has received the results. If you do not hear from us within 7 days, please contact the clinic through [employer] or phone. If you have a critical or abnormal lab result, we will notify you by phone as soon as possible.  Submit refill requests through [employer] or call your pharmacy and they will forward the refill request to us. Please allow 3 business days for your refill to be completed.          Additional Information About Your Visit        MyChart Information     LiveLeaf lets you send messages to your doctor, view your test results, renew your prescriptions, schedule appointments and more. To sign up, go to [email or web address] . Click on \"Log in\" on the left side of the screen, which will take you to the Welcome page. Then click on \"Sign up Now\" on the right side of the page.     You will be asked to enter the access code listed below, as well as some personal information. Please follow the directions to create your username and password.     Your access code is: [email or web address]  Expires: 2018 11:52 AM     Your access code will  in 90 days. If you need help or a new code, please call your [facility] or [phone].        Care EveryWhere ID     This is your Care " EveryWhere ID. This could be used by other organizations to access your [place] medical records  [id number]         Blood Pressure from Last 3 Encounters:   04/13/17 120/62   09/08/16 122/68   06/07/16 115/71    Weight from Last 3 Encounters:   12/10/13 167 lb (75.8 kg)   09/12/12 162 lb (73.5 kg)   05/07/12 162 lb 6.4 oz (73.7 kg)              We Performed the Following     ADMIN INFLUENZA (For MEDICARE Patients ONLY) []     FLU VACCINE, INCREASED ANTIGEN, PRESV FREE, AGE 65+ [56029]        Primary Care Provider Office Phone # Fax #    R Darryn Casas [phone] [address] [id number]        Equal Access to Services     SHAKIR DE DIOS : Hadii aad ku hadasho Soomaali, waaxda luqadaha, qaybta kaalmada adeegyada, david quiros . So [facility] [phone].    ATENCIÓN: Si habla español, tiene a jose disposición servicios gratuitos de asistencia lingüística. Llame al [phone].    We comply with applicable federal civil rights laws and [place] laws. We do not discriminate on the basis of race, color, national origin, age, disability, sex, sexual orientation, or gender identity.            Thank you!     Thank you for choosing [facility]  for your care. Our goal is always to provide you with excellent care. Hearing back from our patients is one way we can continue to improve our services. Please take a few minutes to complete the written survey that you may receive in the mail after your visit with us. Thank you!             Your Updated Medication List - Protect others around you: Learn how to safely use, store and throw away your medicines at www.disposemymeds.org.          This list is accurate as of: 11/7/17 11:52 AM.  Always use your most recent med list.                   Brand Name Dispense Instructions for use Diagnosis    estradiol 0.05 MG/24HR BIW patch    VIVELLE-DOT    12 patch    Place 1 patch onto the skin once a week     Symptomatic menopausal or female climacteric states       fluconazole 150 MG tablet    DIFLUCAN    4 tablet    Take 1 tablet (150 mg) by mouth every 3 days    Candidiasis of vulva and vagina       fluticasone 50 MCG/ACT spray    FLONASE    48 g    Spray 1-2 sprays into both nostrils daily    Chronic rhinitis       IBUPROFEN PO      Take 200 mg by mouth 2 tablets prn        minocycline 50 MG capsule    MINOCIN/DYNACIN    180 capsule    Take 1 capsule (50 mg) by mouth 2 times daily    Perioral dermatitis       simvastatin 40 MG tablet    ZOCOR    30 tablet    Take 1 tablet (40 mg) by mouth At Bedtime (Needs follow-up appointment for this medication)    Hyperlipidemia LDL goal <130       triamcinolone 0.1 % cream    KENALOG    30 g    Apply  topically 2 times daily.    Eczema of both hands          How Severe Is It?: moderate Is This A New Presentation, Or A Follow-Up?: Evaluation for Isotretinoin Females Only: When Was Your Last Menstrual Period?: 04/13/2018

## 2018-07-30 ENCOUNTER — OFFICE VISIT (OUTPATIENT)
Dept: FAMILY MEDICINE | Facility: CLINIC | Age: 73
End: 2018-07-30
Payer: COMMERCIAL

## 2018-07-30 VITALS
DIASTOLIC BLOOD PRESSURE: 70 MMHG | SYSTOLIC BLOOD PRESSURE: 126 MMHG | RESPIRATION RATE: 16 BRPM | TEMPERATURE: 98.5 F | HEART RATE: 84 BPM

## 2018-07-30 DIAGNOSIS — D18.00 BENIGN HEMANGIOMA: Primary | ICD-10-CM

## 2018-07-30 DIAGNOSIS — B00.9 HERPES SIMPLEX VIRUS INFECTION: ICD-10-CM

## 2018-07-30 DIAGNOSIS — Z12.83 SCREENING FOR SKIN CANCER: ICD-10-CM

## 2018-07-30 PROCEDURE — 11301 SHAVE SKIN LESION 0.6-1.0 CM: CPT | Performed by: FAMILY MEDICINE

## 2018-07-30 PROCEDURE — 99213 OFFICE O/P EST LOW 20 MIN: CPT | Mod: 25 | Performed by: FAMILY MEDICINE

## 2018-07-30 RX ORDER — ACYCLOVIR 800 MG/1
800 TABLET ORAL
Qty: 35 TABLET | Refills: 0 | Status: SHIPPED | OUTPATIENT
Start: 2018-07-30 | End: 2018-10-30

## 2018-07-30 NOTE — MR AVS SNAPSHOT
After Visit Summary   7/30/2018    Camelia Bangura    MRN: [id number]           Patient Information     Date Of Birth          1945        Visit Information        Provider Department      7/30/2018 3:40 PM Augusto, CORNEL Cates MD [facility]        Today's Diagnoses     Screening for skin cancer    -  1    Herpes simplex virus infection           Follow-ups after your visit        Additional Services     DERMATOLOGY REFERRAL       Your provider has referred you to: [facility]: [facility] [phone]   [email or web address]    Please be aware that coverage of these services is subject to the terms and limitations of your health insurance plan.  Call member services at your health plan with any benefit or coverage questions.      Please bring the following with you to your appointment:    (1) Any X-Rays, CTs or MRIs which have been performed.  Contact the facility where they were done to arrange for  prior to your scheduled appointment.    (2) List of current medications  (3) This referral request   (4) Any documents/labs given to you for this referral                  Who to contact     If you have questions or need follow up information about today's clinic visit or your schedule please contact [facility] directly at [phone].  Normal or non-critical lab and imaging results will be communicated to you by MyChart, letter or phone within 4 business days after the clinic has received the results. If you do not hear from us within 7 days, please contact the clinic through MyChart or phone. If you have a critical or abnormal lab result, we will notify you by phone as soon as possible.  Submit refill requests through [employer] or call your pharmacy and they will forward the refill request to us. Please allow 3 business days for your refill to be completed.          Additional  "Information About Your Visit        MyChart Information     YES.TAP lets you send messages to your doctor, view your test results, renew your prescriptions, schedule appointments and more. To sign up, go to [email or web address] . Click on \"Log in\" on the left side of the screen, which will take you to the Welcome page. Then click on \"Sign up Now\" on the right side of the page.     You will be asked to enter the access code listed below, as well as some personal information. Please follow the directions to create your username and password.     Your access code is: [email or web address]  Expires: 10/28/2018  3:38 PM     Your access code will  in 90 days. If you need help or a new code, please call your [place] clinic or [phone].        Care EveryWhere ID     This is your Care EveryWhere ID. This could be used by other organizations to access your [place] medical records  [id number]        Your Vitals Were     Pulse Temperature Respirations             84 98.5  F (36.9  C) (Tympanic) 16          Blood Pressure from Last 3 Encounters:   18 126/70   18 134/56   18 123/64    Weight from Last 3 Encounters:   12/10/13 167 lb (75.8 kg)   12 162 lb (73.5 kg)   12 162 lb 6.4 oz (73.7 kg)              We Performed the Following     DERMATOLOGY REFERRAL          Today's Medication Changes          These changes are accurate as of 18  4:14 PM.  If you have any questions, ask your nurse or doctor.               Start taking these medicines.        Dose/Directions    acyclovir 800 MG tablet   Commonly known as:  ZOVIRAX   Used for:  Herpes simplex virus infection   Started by:  CORNEL Casas MD        Dose:  800 mg   Take 1 tablet (800 mg) by mouth 5 times daily   Quantity:  35 tablet   Refills:  0            Where to get your medicines      Some of these will need a paper prescription and others can be bought over the counter.  Ask your nurse if you have questions.     Bring a paper " prescription for each of these medications     acyclovir 800 MG tablet                Primary Care Provider Office Phone # Fax #    R Darryn Casas [phone] [address] [facility] [address]        Equal Access to Services     SHAKIR DE DIOS : Hadii junie whitley ryano Soomaali, waaxda luqadaha, qaybta kaalmada adeliliada, david cranen rosemary tony laAnjumporsha watson. So [facility] [phone].    ATENCIÓN: Si habla español, tiene a jose disposición servicios gratuitos de asistencia lingüística. Llame al [phone].    We comply with applicable federal civil rights laws and [place] laws. We do not discriminate on the basis of race, color, national origin, age, disability, sex, sexual orientation, or gender identity.            Thank you!     Thank you for choosing [facility]  for your care. Our goal is always to provide you with excellent care. Hearing back from our patients is one way we can continue to improve our services. Please take a few minutes to complete the written survey that you may receive in the mail after your visit with us. Thank you!             Your Updated Medication List - Protect others around you: Learn how to safely use, store and throw away your medicines at www.disposemymeds.org.          This list is accurate as of 7/30/18  4:14 PM.  Always use your most recent med list.                   Brand Name Dispense Instructions for use Diagnosis    acyclovir 800 MG tablet    ZOVIRAX    35 tablet    Take 1 tablet (800 mg) by mouth 5 times daily    Herpes simplex virus infection       * estradiol 0.05 MG/24HR BIW patch    VIVELLE-DOT    12 patch    Place 1 patch onto the skin once a week    Symptomatic menopausal or female climacteric states       * estradiol 0.05 MG/24HR WK patch    CLIMARA    4 patch    APPLY ONE PATCH TO SKIN  ONCE A WEEK    Symptomatic menopausal or female climacteric states       fluconazole 150 MG tablet    DIFLUCAN    4 tablet    Take 1  tablet (150 mg) by mouth every 3 days    Candidiasis of vulva and vagina       fluticasone 50 MCG/ACT spray    FLONASE    48 g    Spray 1-2 sprays into both nostrils daily    Chronic rhinitis       IBUPROFEN PO      Take 200 mg by mouth 2 tablets prn        simvastatin 40 MG tablet    ZOCOR    90 tablet    TAKE ONE TABLET AT       BEDTIME FOR CHOLESTEROL    Hyperlipidemia LDL goal <130       triamcinolone 0.1 % cream    KENALOG    30 g    Apply  topically 2 times daily.    Eczema of both hands       * Notice:  This list has 2 medication(s) that are the same as other medications prescribed for you. Read the directions carefully, and ask your doctor or other care provider to review them with you.

## 2018-07-30 NOTE — PROGRESS NOTES
SUBJECTIVE:   Camelia Bangura is a 73 year old female who presents to clinic today for the following health issues:    Concern - Spot on arm  Onset: 3 years or so    Description:   Back round spot on left shoulder. A little bigger than a pea. Patient states she has nicked it a few time and it hurts. Patient would like it removed.      Progression of Symptoms:  Slightly worsened- has got bigger    Accompanying Signs & Symptoms:  no    Therapies Tried and outcome: nothing    Patient would also like to discuss adult acne. No sure if that's what it is but it is bothering her. she does have a history of cold sores on her lips off and on.  Recently had a group of vesicles/papules on the left side of the face just under her eye.  This lasted about 3 weeks and then resolved    Problem list and histories reviewed & adjusted, as indicated.  Additional history: She had shown me the lesion on her right posterior arm before and was reassured it was a benign hemangioma.  However it does get irritated and uncomfortable at times and therefore she would like that        Reviewed and updated as nee ded this visit by clinical staff       Reviewed and updated as needed this visit by Provider                     OBJECTIVE:                                                    /70  Pulse 84  Temp 98.5  F (36.9  C) (Tympanic)  Resp 16    GENERAL: healthy, alert and no distress  EYES: Eyes grossly normal to inspection, extraocular movements - intact, and PERRL  SKIN: Purple papule on the right posterior arm, shoulder region, 6 mm in diameter and raised with distinct uniform edges.    Some mild residual erythema under the left eye with some minimal papules consistent with resolving herpes simplex infection       ASSESSMENT/PLAN:                                                    ASSESSMENT:  1. Benign hemangioma    2. Herpes simplex virus infection    3. Screening for skin cancer    It sounds like the skin reaction on her left  face was a herpes simplex infection.  We will give her a prescription to use in the future if it recurs. The hemangioma on her Right upper arm is benign, but removal is justified since it is bothering her and will catch on her clothing    PLAN:  Orders Placed This Encounter     SHAV SKIN LESION TRUNK/ARM/LEG 0.6-1.0 CM     DERMATOLOGY REFERRAL     acyclovir (ZOVIRAX) 800 MG tablet     The skin was prepped with Hibiclens, anesthetized with 1% lidocaine with epi, and then a tangential shave excision was performed.  There was an obvious hemangioma which was cauterized at the base to control the bleeding.  No specimen was sent for exam.  Wound care was discussed    CORNEL Casas  [facility]

## 2018-09-12 ENCOUNTER — HOSPITAL ENCOUNTER (OUTPATIENT)
Dept: MAMMOGRAPHY | Facility: CLINIC | Age: 73
Discharge: HOME | End: 2018-09-12
Attending: FAMILY MEDICINE | Admitting: FAMILY MEDICINE
Payer: COMMERCIAL

## 2018-09-12 ENCOUNTER — ALLIED HEALTH/NURSE VISIT (OUTPATIENT)
Dept: FAMILY MEDICINE | Facility: CLINIC | Age: 73
End: 2018-09-12
Payer: COMMERCIAL

## 2018-09-12 DIAGNOSIS — Z12.31 VISIT FOR SCREENING MAMMOGRAM: ICD-10-CM

## 2018-09-12 DIAGNOSIS — Z23 NEED FOR PROPHYLACTIC VACCINATION AND INOCULATION AGAINST INFLUENZA: Primary | ICD-10-CM

## 2018-09-12 PROCEDURE — 90662 IIV NO PRSV INCREASED AG IM: CPT

## 2018-09-12 PROCEDURE — G0008 ADMIN INFLUENZA VIRUS VAC: HCPCS

## 2018-09-12 PROCEDURE — 77067 SCR MAMMO BI INCL CAD: CPT

## 2018-09-12 NOTE — MR AVS SNAPSHOT
After Visit Summary   9/12/2018    Camelia Bangura    MRN: [id number]           Patient Information     Date Of Birth          1945        Visit Information        Provider Department      9/12/2018 10:00 AM [facility]        Today's Diagnoses     Need for prophylactic vaccination and inoculation against influenza    -  1       Follow-ups after your visit        Who to contact     If you have questions or need follow up information about today's clinic visit or your schedule please contact [facility] directly at [phone].  Normal or non-critical lab and imaging results will be communicated to you by MyChart, letter or phone within 4 business days after the clinic has received the results. If you do not hear from us within 7 days, please contact the clinic through MyChart or phone. If you have a critical or abnormal lab result, we will notify you by phone as soon as possible.  Submit refill requests through [employer] or call your pharmacy and they will forward the refill request to us. Please allow 3 business days for your refill to be completed.          Additional Information About Your Visit        Care EveryWhere ID     This is your Care EveryWhere ID. This could be used by other organizations to access your [place] medical records  [id number]         Blood Pressure from Last 3 Encounters:   07/30/18 126/70   04/27/18 134/56   02/07/18 123/64    Weight from Last 3 Encounters:   12/10/13 167 lb (75.8 kg)   09/12/12 162 lb (73.5 kg)   05/07/12 162 lb 6.4 oz (73.7 kg)              We Performed the Following     ADMIN INFLUENZA (For MEDICARE Patients ONLY) []     FLU VACCINE, INCREASED ANTIGEN, PRESV FREE, AGE 65+ [85460]        Primary Care Provider Office Phone # Fax #    R Darryn Casas [phone] [address] [facility] [address]        Equal Access to Services     SHAKIR DE DIOS AH: Yash regalado  Alvarez, jessicada luqadaha, qaybta kaalmada norm, david riley radhaparvin joshuadrew walter. So [facility] [phone].    ATENCIÓN: Si kathya ramirez, tiene a jose disposición servicios gratuitos de asistencia lingüística. Markus al [phone].    We comply with applicable federal civil rights laws and [place] laws. We do not discriminate on the basis of race, color, national origin, age, disability, sex, sexual orientation, or gender identity.            Thank you!     Thank you for choosing [facility]  for your care. Our goal is always to provide you with excellent care. Hearing back from our patients is one way we can continue to improve our services. Please take a few minutes to complete the written survey that you may receive in the mail after your visit with us. Thank you!             Your Updated Medication List - Protect others around you: Learn how to safely use, store and throw away your medicines at www.disposemymeds.org.          This list is accurate as of 9/12/18  4:15 PM.  Always use your most recent med list.                   Brand Name Dispense Instructions for use Diagnosis    acyclovir 800 MG tablet    ZOVIRAX    35 tablet    Take 1 tablet (800 mg) by mouth 5 times daily    Herpes simplex virus infection       * estradiol 0.05 MG/24HR BIW patch    VIVELLE-DOT    12 patch    Place 1 patch onto the skin once a week    Symptomatic menopausal or female climacteric states       * estradiol 0.05 MG/24HR WK patch    CLIMARA    4 patch    APPLY ONE PATCH TO SKIN  ONCE A WEEK    Symptomatic menopausal or female climacteric states       fluconazole 150 MG tablet    DIFLUCAN    4 tablet    Take 1 tablet (150 mg) by mouth every 3 days    Candidiasis of vulva and vagina       fluticasone 50 MCG/ACT spray    FLONASE    48 g    Spray 1-2 sprays into both nostrils daily    Chronic rhinitis       IBUPROFEN PO      Take 200 mg by mouth 2 tablets prn        simvastatin 40 MG tablet    ZOCOR    90  tablet    TAKE ONE TABLET AT       BEDTIME FOR CHOLESTEROL    Hyperlipidemia LDL goal <130       triamcinolone 0.1 % cream    KENALOG    30 g    Apply  topically 2 times daily.    Eczema of both hands       * Notice:  This list has 2 medication(s) that are the same as other medications prescribed for you. Read the directions carefully, and ask your doctor or other care provider to review them with you.

## 2018-09-12 NOTE — PROGRESS NOTES

## 2018-10-23 ENCOUNTER — OFFICE VISIT (OUTPATIENT)
Dept: FAMILY MEDICINE | Facility: CLINIC | Age: 73
End: 2018-10-23
Payer: COMMERCIAL

## 2018-10-23 VITALS
HEART RATE: 76 BPM | TEMPERATURE: 97.7 F | RESPIRATION RATE: 16 BRPM | OXYGEN SATURATION: 98 % | SYSTOLIC BLOOD PRESSURE: 130 MMHG | DIASTOLIC BLOOD PRESSURE: 82 MMHG

## 2018-10-23 DIAGNOSIS — G56.02 CARPAL TUNNEL SYNDROME OF LEFT WRIST: ICD-10-CM

## 2018-10-23 DIAGNOSIS — R20.2 PARESTHESIAS: Primary | ICD-10-CM

## 2018-10-23 PROCEDURE — 99214 OFFICE O/P EST MOD 30 MIN: CPT | Performed by: FAMILY MEDICINE

## 2018-10-23 NOTE — MR AVS SNAPSHOT
After Visit Summary   10/23/2018    Camelia Bangura    MRN: [id number]           Patient Information     Date Of Birth          1945        Visit Information        Provider Department      10/23/2018 9:00 AM CORNEL Casas MD [facility]        Today's Diagnoses     Paresthesias    -  1    Carpal tunnel syndrome of left wrist           Follow-ups after your visit        Who to contact     If you have questions or need follow up information about today's clinic visit or your schedule please contact [facility] directly at [phone].  Normal or non-critical lab and imaging results will be communicated to you by MyChart, letter or phone within 4 business days after the clinic has received the results. If you do not hear from us within 7 days, please contact the clinic through MyChart or phone. If you have a critical or abnormal lab result, we will notify you by phone as soon as possible.  Submit refill requests through [employer] or call your pharmacy and they will forward the refill request to us. Please allow 3 business days for your refill to be completed.          Additional Information About Your Visit        Care EveryWhere ID     This is your Care EveryWhere ID. This could be used by other organizations to access your [place] medical records  [id number]        Your Vitals Were     Pulse Temperature Respirations Pulse Oximetry          76 97.7  F (36.5  C) (Tympanic) 16 98%         Blood Pressure from Last 3 Encounters:   10/23/18 130/82   07/30/18 126/70   04/27/18 134/56    Weight from Last 3 Encounters:   12/10/13 167 lb (75.8 kg)   09/12/12 162 lb (73.5 kg)   05/07/12 162 lb 6.4 oz (73.7 kg)              Today, you had the following     No orders found for display       Primary Care Provider Office Phone # Fax #    CORNEL Casas [phone] [address] [facility] [address]        Equal Access to Services     SHAKIR  GAAR : Hadii aad ku sabine Pino, waaxda luqadaha, qaybta kaalmada adejing, david jen roycedrew riley jaydenarian quiros . So [facility] [phone].    ATENCIÓN: Si habla james, tiene a jose disposición servicios gratuitos de asistencia lingüística. Llame al [phone].    We comply with applicable federal civil rights laws and [place] laws. We do not discriminate on the basis of race, color, national origin, age, disability, sex, sexual orientation, or gender identity.            Thank you!     Thank you for choosing [facility]  for your care. Our goal is always to provide you with excellent care. Hearing back from our patients is one way we can continue to improve our services. Please take a few minutes to complete the written survey that you may receive in the mail after your visit with us. Thank you!             Your Updated Medication List - Protect others around you: Learn how to safely use, store and throw away your medicines at www.disposemymeds.org.          This list is accurate as of 10/23/18 10:08 AM.  Always use your most recent med list.                   Brand Name Dispense Instructions for use Diagnosis    acyclovir 800 MG tablet    ZOVIRAX    35 tablet    Take 1 tablet (800 mg) by mouth 5 times daily    Herpes simplex virus infection       * estradiol 0.05 MG/24HR BIW patch    VIVELLE-DOT    12 patch    Place 1 patch onto the skin once a week    Symptomatic menopausal or female climacteric states       * estradiol 0.05 MG/24HR WK patch    CLIMARA    4 patch    APPLY ONE PATCH TO SKIN  ONCE A WEEK    Symptomatic menopausal or female climacteric states       fluconazole 150 MG tablet    DIFLUCAN    4 tablet    Take 1 tablet (150 mg) by mouth every 3 days    Candidiasis of vulva and vagina       fluticasone 50 MCG/ACT spray    FLONASE    48 g    Spray 1-2 sprays into both nostrils daily    Chronic rhinitis       IBUPROFEN PO      Take 200 mg by mouth 2 tablets prn         simvastatin 40 MG tablet    ZOCOR    90 tablet    TAKE ONE TABLET AT       BEDTIME FOR CHOLESTEROL    Hyperlipidemia LDL goal <130       triamcinolone 0.1 % cream    KENALOG    30 g    Apply  topically 2 times daily.    Eczema of both hands       * Notice:  This list has 2 medication(s) that are the same as other medications prescribed for you. Read the directions carefully, and ask your doctor or other care provider to review them with you.

## 2018-10-23 NOTE — PROGRESS NOTES
Subjective:  Camelia Bangura is a 73 year old female   Chief Complaint   Patient presents with     Patient Request     concerns with numbness in left hand X 1 month. some stiffness, pt. states she sleeps on left side ? related.      Patient Request     tingling all over legs, arms, hands and feet X Saturday morning one episode.      She will wake up during the night or in the morning in the left hand will be numb, sometimes radiating up her forearm.  It does resolve if she shakes or moves her hand.  She has tried changing to sleeping on her right side, but still tends to get this.    She also had one episode a few days ago where she woke up and noticed tingling in both legs somewhat into her hands and feet.  She wondered about the possibility of multiple sclerosis.  However it has not recurred and she has not noticed any change in her balance, coordination or gait.  He has not noticed any visual changes or other neurologic symptoms        Encounter Diagnoses   Name Primary?     Paresthesias Yes     Carpal tunnel syndrome of left wrist        ROS:other than noted above, general, HEENT, respiratory, cardiac, gastrointestinal systems are negative    Medical, surgical, social, and family histories, medications and allergies reviewed and updated.    Objective:  Exam:    GENERAL APPEARANCE ADULT: Alert, no acute distress  EYES: PERRL, EOM normal, conjunctiva and lids normal  CV: Normal radial pulses both wrists  MS: extremities normal, no peripheral edema; no atrophy in the intrinsic muscles of the hand    NEURO: Alert, oriented, speech and mentation normal, Cranial nerves 2-12 are normal., Strength normal and symmetrical in upper and lower extremities., Reflexes 2+ and symmetrical at biceps, triceps, brachioradialis, knees and ankles, Finger to nose and heel to shin testing is normal, Sensation is normal but her description of where the numbness occurs in her hand fits with the median distribution., Gait including  heel, toe and tandem gait are normal  PSYCH: mentation appears normal.,  Mildly anxious      ASSESSMENT:  1. Paresthesias    2. Carpal tunnel syndrome of left wrist      It is not clear what caused the paresthesias that she had transiently.  I reassured her that it does not appear to be multiple sclerosis at this time.  I discussed the pathophysiology of carpal tunnel syndrome.       PLAN:  She was provided with a wrist brace to wear at night on the left.  Encouraged to wear it every night for a month and if not improving should let me know

## 2018-10-30 ENCOUNTER — OFFICE VISIT (OUTPATIENT)
Dept: FAMILY MEDICINE | Facility: CLINIC | Age: 73
End: 2018-10-30
Payer: COMMERCIAL

## 2018-10-30 VITALS
RESPIRATION RATE: 16 BRPM | SYSTOLIC BLOOD PRESSURE: 120 MMHG | DIASTOLIC BLOOD PRESSURE: 70 MMHG | HEART RATE: 64 BPM | OXYGEN SATURATION: 98 % | TEMPERATURE: 98 F

## 2018-10-30 DIAGNOSIS — L30.9 ECZEMA OF BOTH HANDS: ICD-10-CM

## 2018-10-30 DIAGNOSIS — N95.1 SYMPTOMATIC MENOPAUSAL OR FEMALE CLIMACTERIC STATES: ICD-10-CM

## 2018-10-30 DIAGNOSIS — B37.31 CANDIDIASIS OF VULVA AND VAGINA: ICD-10-CM

## 2018-10-30 DIAGNOSIS — J31.0 CHRONIC RHINITIS: ICD-10-CM

## 2018-10-30 DIAGNOSIS — E78.5 HYPERLIPIDEMIA LDL GOAL <130: ICD-10-CM

## 2018-10-30 PROCEDURE — 99213 OFFICE O/P EST LOW 20 MIN: CPT | Performed by: FAMILY MEDICINE

## 2018-10-30 RX ORDER — SIMVASTATIN 40 MG
TABLET ORAL
Qty: 90 TABLET | Refills: 3 | Status: SHIPPED | OUTPATIENT
Start: 2018-10-30 | End: 2019-12-27

## 2018-10-30 RX ORDER — TRIAMCINOLONE ACETONIDE 1 MG/G
CREAM TOPICAL
Qty: 30 G | Refills: 11 | Status: SHIPPED | OUTPATIENT
Start: 2018-10-30 | End: 2020-01-28

## 2018-10-30 RX ORDER — ESTRADIOL 0.05 MG/D
PATCH TRANSDERMAL
Qty: 4 PATCH | Refills: 11 | Status: SHIPPED | OUTPATIENT
Start: 2018-10-30 | End: 2021-04-19 | Stop reason: DRUGHIGH

## 2018-10-30 RX ORDER — FLUTICASONE PROPIONATE 50 MCG
1-2 SPRAY, SUSPENSION (ML) NASAL DAILY
Qty: 48 G | Refills: 11 | Status: SHIPPED | OUTPATIENT
Start: 2018-10-30 | End: 2020-01-28

## 2018-10-30 RX ORDER — FLUCONAZOLE 150 MG/1
150 TABLET ORAL
Qty: 4 TABLET | Refills: 6 | Status: SHIPPED | OUTPATIENT
Start: 2018-10-30 | End: 2020-01-28

## 2018-10-30 NOTE — MR AVS SNAPSHOT
After Visit Summary   10/30/2018    Camelia Bangura    MRN: [id number]           Patient Information     Date Of Birth          1945        Visit Information        Provider Department      10/30/2018 2:20 PM CORNEL Casas MD [facility]        Today's Diagnoses     Candidiasis of vulva and vagina        Symptomatic menopausal or female climacteric states        Hyperlipidemia LDL goal <130        Eczema of both hands        Chronic rhinitis           Follow-ups after your visit        Who to contact     If you have questions or need follow up information about today's clinic visit or your schedule please contact [facility] directly at [phone].  Normal or non-critical lab and imaging results will be communicated to you by MyChart, letter or phone within 4 business days after the clinic has received the results. If you do not hear from us within 7 days, please contact the clinic through MyChart or phone. If you have a critical or abnormal lab result, we will notify you by phone as soon as possible.  Submit refill requests through [employer] or call your pharmacy and they will forward the refill request to us. Please allow 3 business days for your refill to be completed.          Additional Information About Your Visit        Care EveryWhere ID     This is your Care EveryWhere ID. This could be used by other organizations to access your [place] medical records  [id number]        Your Vitals Were     Pulse Temperature Respirations Pulse Oximetry          64 98  F (36.7  C) (Tympanic) 16 98%         Blood Pressure from Last 3 Encounters:   10/30/18 120/70   10/23/18 130/82   07/30/18 126/70    Weight from Last 3 Encounters:   12/10/13 167 lb (75.8 kg)   09/12/12 162 lb (73.5 kg)   05/07/12 162 lb 6.4 oz (73.7 kg)              Today, you had the following     No orders found for display         Today's Medication Changes          These changes are accurate  as of 10/30/18  3:09 PM.  If you have any questions, ask your nurse or doctor.               These medicines have changed or have updated prescriptions.        Dose/Directions    estradiol 0.05 MG/24HR WK patch   Commonly known as:  CLIMARA   This may have changed:  Another medication with the same name was removed. Continue taking this medication, and follow the directions you see here.   Used for:  Symptomatic menopausal or female climacteric states   Changed by:  CORNEL Casas MD        APPLY ONE PATCH TO SKIN  ONCE A WEEK   Quantity:  4 patch   Refills:  11            Where to get your medicines      These medications were sent to [email or web address]. [address] [address]     Phone:  [phone]     estradiol 0.05 MG/24HR WK patch    fluconazole 150 MG tablet    fluticasone 50 MCG/ACT spray    simvastatin 40 MG tablet    triamcinolone 0.1 % cream                Primary Care Provider Office Phone # Fax #    CORNEL Casas [phone] [address] [facility] [address]        Equal Access to Services     [facility]: Hadii aad ku hadasho Soomaali, waaxda luqadaha, qaybta kaalmada adeegyalydia, david quiros . So [facility] [phone].    ATENCIÓN: Si habla español, tiene a jose disposición servicios gratuitos de asistencia lingüística. [facility] [phone].    We comply with applicable federal civil rights laws and [place] laws. We do not discriminate on the basis of race, color, national origin, age, disability, sex, sexual orientation, or gender identity.            Thank you!     Thank you for choosing [facility]  for your care. Our goal is always to provide you with excellent care. Hearing back from our patients is one way we can continue to improve our services. Please take a few minutes to complete the written survey that you may receive in the mail after  your visit with us. Thank you!             Your Updated Medication List - Protect others around you: Learn how to safely use, store and throw away your medicines at www.disposemymeds.org.          This list is accurate as of 10/30/18  3:09 PM.  Always use your most recent med list.                   Brand Name Dispense Instructions for use Diagnosis    estradiol 0.05 MG/24HR WK patch    CLIMARA    4 patch    APPLY ONE PATCH TO SKIN  ONCE A WEEK    Symptomatic menopausal or female climacteric states       fluconazole 150 MG tablet    DIFLUCAN    4 tablet    Take 1 tablet (150 mg) by mouth every 3 days    Candidiasis of vulva and vagina       fluticasone 50 MCG/ACT spray    FLONASE    48 g    Spray 1-2 sprays into both nostrils daily    Chronic rhinitis       IBUPROFEN PO      Take 200 mg by mouth 2 tablets prn        simvastatin 40 MG tablet    ZOCOR    90 tablet    TAKE ONE TABLET AT       BEDTIME FOR CHOLESTEROL    Hyperlipidemia LDL goal <130       triamcinolone 0.1 % cream    KENALOG    30 g    Apply  topically 2 times daily.    Eczema of both hands

## 2018-10-30 NOTE — PROGRESS NOTES
Subjective:  Camelia Bangura is a 73 year old female   Chief Complaint   Patient presents with     Patient Request     pt. is here today for yeast infection and has a hx of these a lot. pt. is here for a refill on fluconazole.      She has had recurrent episodes of yeast vaginitis off and on for years.  This is always responded to oral fluconazole and has never had other types of vaginitis such as bacterial vaginosis.  She had a prescription with refills to use as needed but the last prescription  so she would like to get a refill with some additional refills for future use.  Interestingly, she was taking a probiotic for a number of months and it seemed like she had less of the yeast infections while she was on.  She is planning to go back on the probiotic.    As long as she is here she is requesting refills on her other medications since I will be retiring and she wants to have some time to find a new primary care provider        Encounter Diagnoses   Name Primary?     Candidiasis of vulva and vagina      Symptomatic menopausal or female climacteric states      Hyperlipidemia LDL goal <130      Eczema of both hands      Chronic rhinitis          Medical, surgical, social, and family histories, medications and allergies reviewed and updated.    Objective:  Exam:    GENERAL APPEARANCE ADULT: Alert, no acute distress  EYES: PERRL, EOM normal, conjunctiva and lids normal   (female): Not done since her symptoms are typical and has always responded well to the fluconazole      ASSESSMENT:  1. Candidiasis of vulva and vagina    2. Symptomatic menopausal or female climacteric states    3. Hyperlipidemia LDL goal <130    4. Eczema of both hands    5. Chronic rhinitis        PLAN:  Orders Placed This Encounter     fluconazole (DIFLUCAN) 150 MG tablet     estradiol (CLIMARA) 0.05 MG/24HR WK patch     simvastatin (ZOCOR) 40 MG tablet     triamcinolone (KENALOG) 0.1 % cream     fluticasone (FLONASE) 50 MCG/ACT spray

## 2019-01-31 ENCOUNTER — OFFICE VISIT (OUTPATIENT)
Dept: FAMILY MEDICINE | Facility: CLINIC | Age: 74
End: 2019-01-31
Payer: COMMERCIAL

## 2019-01-31 VITALS
OXYGEN SATURATION: 97 % | DIASTOLIC BLOOD PRESSURE: 70 MMHG | HEART RATE: 85 BPM | TEMPERATURE: 97.9 F | SYSTOLIC BLOOD PRESSURE: 110 MMHG

## 2019-01-31 DIAGNOSIS — H61.23 BILATERAL IMPACTED CERUMEN: Primary | ICD-10-CM

## 2019-01-31 PROCEDURE — 69210 REMOVE IMPACTED EAR WAX UNI: CPT | Performed by: NURSE PRACTITIONER

## 2019-01-31 PROCEDURE — 99213 OFFICE O/P EST LOW 20 MIN: CPT | Mod: 25 | Performed by: NURSE PRACTITIONER

## 2019-01-31 NOTE — PROGRESS NOTES
SUBJECTIVE:   Camelia Bangura is a 73 year old female who presents to clinic today for the following health issues:  Chief Complaint   Patient presents with     Ear Problem     Pt here for right ear feeling plugged.       Right ear plugged      Duration: 10 days    Description (location/character/radiation): feels plugged    Intensity:  No pain    Accompanying signs and symptoms: no other symptoms, no cold    History (similar episodes/previous evaluation): pt had ears cleaned this past summer    Precipitating or alleviating factors: None    Therapies tried and outcome: debrox for past 5 days, washing out with warm water     Problem list and histories reviewed & adjusted, as indicated.  Additional history: as documented    Patient Active Problem List   Diagnosis     Dysplasia of cervix     Symptomatic menopausal or female climacteric states     Postmenopausal atrophic vaginitis     Candidiasis of vulva and vagina     HYPERLIPIDEMIA LDL GOAL <130     Chronic rhinitis     Health Care Home     Advanced directives, counseling/discussion     Eczema of both hands     Past Surgical History:   Procedure Laterality Date     C APPENDECTOMY       HC CORRECT BUNION,SIMPLE  05/23/90    Right foot     HC REMOVAL OF TONSILS,<13 Y/O       HYSTERECTOMY, PAP NO LONGER INDICATED  4/05     HYSTERECTOMY, VAGINAL  4/05     SURGICAL HISTORY OF -   06/19/95    Bunion deformity & fractured tibial sesamoid left foot       Social History     Tobacco Use     Smoking status: Never Smoker     Smokeless tobacco: Never Used   Substance Use Topics     Alcohol use: Yes     Comment: occasional wine     Family History   Problem Relation Age of Onset     Cerebrovascular Disease Mother      Arthritis Mother      Osteoporosis Mother      Melanoma No family hx of          Current Outpatient Medications   Medication Sig Dispense Refill     estradiol (CLIMARA) 0.05 MG/24HR WK patch APPLY ONE PATCH TO SKIN  ONCE A WEEK 4 patch 11     simvastatin (ZOCOR)  40 MG tablet TAKE ONE TABLET AT       BEDTIME FOR CHOLESTEROL 90 tablet 3     fluconazole (DIFLUCAN) 150 MG tablet Take 1 tablet (150 mg) by mouth every 3 days (Patient not taking: Reported on 1/31/2019) 4 tablet 6     fluticasone (FLONASE) 50 MCG/ACT spray Spray 1-2 sprays into both nostrils daily (Patient not taking: Reported on 1/31/2019) 48 g 11     IBUPROFEN PO Take 200 mg by mouth 2 tablets prn       triamcinolone (KENALOG) 0.1 % cream Apply  topically 2 times daily. (Patient not taking: Reported on 1/31/2019) 30 g 11     Allergies   Allergen Reactions     Seasonal Allergies        Reviewed and updated as needed this visit by clinical staff  Tobacco  Allergies  Meds  Med Hx  Surg Hx  Fam Hx  Soc Hx      Reviewed and updated as needed this visit by Provider         ROS:  CONSTITUTIONAL: NEGATIVE for fever, chills, change in weight  ENT/MOUTH: POSITIVE for right ear feeling plugged  RESP: NEGATIVE for significant cough or SOB  CV: NEGATIVE for chest pain, palpitations or peripheral edema  PSYCHIATRIC: NEGATIVE for changes in mood or affect  ROS otherwise negative    OBJECTIVE:     /70   Pulse 85   Temp 97.9  F (36.6  C) (Tympanic)   SpO2 97%   There is no height or weight on file to calculate BMI.  GENERAL: healthy, alert and no distress  HENT: normal cephalic/atraumatic and both ears: occluded with wax  RESP: lungs clear to auscultation - no rales, rhonchi or wheezes  CV: regular rate and rhythm, normal S1 S2, no S3 or S4, no murmur, click or rub, no peripheral edema and peripheral pulses strong  PSYCH: mentation appears normal, affect normal/bright    Diagnostic Test Results:  none     ASSESSMENT/PLAN:     1. Bilateral impacted cerumen  Bilateral ears were washed out by MA and I used currette to remove some of the wax in bilateral ears also.  Right canal was a little irritated from wax impaction after washing but hearing has returned to normal.  Right ear canal and TM was normal after washing.   Discussed use of Debrox ear drops.  Recommended to not use Debrox for 4 days and then restart if symptoms recur as needed.  Follow-up as needed.  - HC REMOVAL IMPACTED CERUMEN IRRIGATION/LVG UNILAT    See Patient Instructions    Kassandra Marrero NP  [facility]

## 2019-06-25 ENCOUNTER — OFFICE VISIT (OUTPATIENT)
Dept: DERMATOLOGY | Facility: CLINIC | Age: 74
End: 2019-06-25
Attending: FAMILY MEDICINE
Payer: COMMERCIAL

## 2019-06-25 DIAGNOSIS — L57.8 SUN-DAMAGED SKIN: ICD-10-CM

## 2019-06-25 DIAGNOSIS — L73.8 SENILE SEBACEOUS GLAND HYPERPLASIA: ICD-10-CM

## 2019-06-25 DIAGNOSIS — L81.4 SOLAR LENTIGO: ICD-10-CM

## 2019-06-25 DIAGNOSIS — D48.9 NEOPLASM OF UNCERTAIN BEHAVIOR: Primary | ICD-10-CM

## 2019-06-25 PROCEDURE — 11102 TANGNTL BX SKIN SINGLE LES: CPT | Performed by: DERMATOLOGY

## 2019-06-25 PROCEDURE — 88305 TISSUE EXAM BY PATHOLOGIST: CPT | Mod: TC | Performed by: DERMATOLOGY

## 2019-06-25 PROCEDURE — 99202 OFFICE O/P NEW SF 15 MIN: CPT | Mod: 25 | Performed by: DERMATOLOGY

## 2019-06-25 ASSESSMENT — PAIN SCALES - GENERAL: PAINLEVEL: NO PAIN (0)

## 2019-06-25 NOTE — LETTER
"    6/25/2019         RE: Camelia Bangura  [address] [facility] [address]        Dear Colleague,    Thank you for referring your patient, Camelia Bangura, to the [facility]. Please see a copy of my visit note below.    [facility] Dermatology Note      Dermatology Problem List:  1. Solar elastosis, right zygomatic cheek inferior and superior, s/p biopsy 11/10/2015  2. NUB, right nasolabial fold, s/p biopsy 6/25/2019    Encounter Date: Jun 25, 2019    CC:  Chief Complaint   Patient presents with     Lesion     facial lesions         History of Present Illness:  Ms. Camelia Bangura is a 74 year old female who presents as a referral from Dr. Lundberg for spot of her face. She has a bump on her right cheek that has been there a year and half. Soemtimes off white material comes out of it. She thought it was a pimple but it never healed as she would expect. Her PCP thought it was a \"white mole.\"  She also has a spot of concern on her left cheek that has been presents for a while (years). Not tender, has not bled. No personal or family history of skin cancer. No other concerns addressed today.       Past Medical History:   Patient Active Problem List   Diagnosis     Dysplasia of cervix     Symptomatic menopausal or female climacteric states     Postmenopausal atrophic vaginitis     Candidiasis of vulva and vagina     HYPERLIPIDEMIA LDL GOAL <130     Chronic rhinitis     Health Care Home     Advanced directives, counseling/discussion     Eczema of both hands     Past Medical History:   Diagnosis Date     Arthritis      Contact dermatitis and other eczema, due to unspecified cause      Dysplasia of cervix, unspecified 2005     Past Surgical History:   Procedure Laterality Date     C APPENDECTOMY       HC CORRECT BUNION,SIMPLE  05/23/90    Right foot     HC REMOVAL OF TONSILS,<13 Y/O       HYSTERECTOMY, PAP NO LONGER INDICATED  4/05     HYSTERECTOMY, VAGINAL  4/05     " SURGICAL HISTORY OF -   06/19/95    Bunion deformity & fractured tibial sesamoid left foot       Social History:  Patient reports that she has never smoked. She has never used smokeless tobacco. She reports that she drinks alcohol. She reports that she does not use drugs. Lives in [place].     Family History:  No family history of skin cancer.     Medications:  Current Outpatient Medications   Medication Sig Dispense Refill     estradiol (CLIMARA) 0.05 MG/24HR WK patch APPLY ONE PATCH TO SKIN  ONCE A WEEK 4 patch 11     fluconazole (DIFLUCAN) 150 MG tablet Take 1 tablet (150 mg) by mouth every 3 days 4 tablet 6     fluticasone (FLONASE) 50 MCG/ACT spray Spray 1-2 sprays into both nostrils daily 48 g 11     IBUPROFEN PO Take 200 mg by mouth 2 tablets prn       simvastatin (ZOCOR) 40 MG tablet TAKE ONE TABLET AT       BEDTIME FOR CHOLESTEROL 90 tablet 3     triamcinolone (KENALOG) 0.1 % cream Apply  topically 2 times daily. 30 g 11       Allergies   Allergen Reactions     Seasonal Allergies        Review of Systems:  -Constitutional: Patient is otherwise feeling well, in usual state of health.   -Skin: As above in HPI. No additional skin concerns.    Physical exam:  Vitals: There were no vitals taken for this visit.  GEN: This is a well developed, well-nourished female in no acute distress, in a pleasant mood.    SKIN: Sun-exposed skin, which includes the head/face, neck, both arms, digits, and/or nails was examined.   - Rosenbaum Type I-II  - Scattered brown macules on sun exposed areas, especially right lateral cheek.  - There are yellow oily papules with central umbilication located on the right cheek, left cheek.  - right nasolabial fold, pink papule with central off white area with fine blood vessels in the area  - No other lesions of concern on areas examined.           Impression/Plan:  1. Sun damaged skin with solar lentigines    Recommend sunscreens SPF #30 or greater, protective clothing and avoidance  of tanning beds.    2. Benign findings including: sebaceous hyperplasia     No further intervention required. Patient to report changes.     Patient reassured of the benign nature of these lesions.    Recommended an over the counter retinol like Oil of Olay to see if this prevents new lesions from occuring.     3. NUB, right nasolabial fold. pink papule with central off white area with fine blood vessels in the area. Ddx: inflammatory papule vs nevus vs BCC    Shave biopsy:  After discussion of benefits and risks including but not limited to bleeding/bruising, pain/swelling, infection, scar, incomplete removal, nerve damage/numbness, recurrence, and non-diagnostic biopsy, written consent, verbal consent and photographs were obtained. Time-out was performed. The area was cleaned with isopropyl alcohol. 0.5ml of 1% lidocaine with 1:100,000 epinephrine was injected to obtain adequate anesthesia. A shave biopsy was performed. Hemostasis was achieved with aluminium chloride. Vaseline and a sterile dressing were applied. The patient tolerated the procedure and no complications were noted. The patient was provided with verbal and written post care instructions.       DORCAS Casas MD  Follow-up pending biopsy results.    Staff Involved:  Scribe/Staff    Scribe Disclosure  I, Yazmin Villagran, am serving as a scribe to document services personally performed by Dr. Annika Clark MD, based on data collection and the provider's statements to me.     Provider Disclosure:   The documentation recorded by the scribe accurately reflects the services I personally performed and the decisions made by me.    Annika Clark MD    Department of Dermatology  [facility]: Phone: [phone], Fax:[phone]  [facility]: Phone: [phone], Fax: [phone]              Again, thank you for allowing me to  participate in the care of your patient.        Sincerely,        Annika Clark MD

## 2019-06-25 NOTE — PATIENT INSTRUCTIONS
Try over the counter retinol, like Oil of Olay.     Wound Care After a Biopsy    What is a skin biopsy?  A skin biopsy allows the doctor to examine a very small piece of tissue under the microscope to determine the diagnosis and the best treatment for the skin condition. A local anesthetic (numbing medicine)  is injected with a very small needle into the skin area to be tested. A small piece of skin is taken from the area. Sometimes a suture (stitch) is used.     What are the risks of a skin biopsy?  I will experience scar, bleeding, swelling, pain, crusting and redness. I may experience incomplete removal or recurrence. Risks of this procedure are excessive bleeding, bruising, infection, nerve damage, numbness, thick (hypertrophic or keloidal) scar and non-diagnostic biopsy.    How should I care for my wound for the first 24 hours?    Keep the wound dry and covered for 24 hours    If it bleeds, hold direct pressure on the area for 15 minutes. If bleeding does not stop then go to the emergency room    Avoid strenuous exercise the first 1-2 days or as your doctor instructs you    How should I care for the wound after 24 hours?    After 24 hours, remove the bandage    You may bathe or shower as normal    If you had a scalp biopsy, you can shampoo as usual and can use shower water to clean the biopsy site daily    Clean the wound twice a day with gentle soap and water    Do not scrub, be gentle    Apply white petroleum/Vaseline after cleaning the wound with a cotton swab or a clean finger, and keep the site covered with a Bandaid /bandage. Bandages are not necessary with a scalp biopsy    If you are unable to cover the site with a Bandaid /bandage, re-apply ointment 2-3 times a day to keep the site moist. Moisture will help with healing    Avoid strenuous activity for first 1-2 days    Avoid lakes, rivers, pools, and oceans until the stitches are removed or the site is healed    How do I clean my wound?    Wash hands  thoroughly with soap or use hand  before all wound care    Clean the wound with gentle soap and water    Apply white petroleum/Vaseline  to wound after it is clean    Replace the Bandaid /bandage to keep the wound covered for the first few days or as instructed by your doctor    If you had a scalp biopsy, warm shower water to the area on a daily basis should suffice    What should I use to clean my wound?     Cotton-tipped applicators (Qtips )    White petroleum jelly (Vaseline ). Use a clean new container and use Q-tips to apply.    Bandaids   as needed    Gentle soap     How should I care for my wound long term?    Do not get your wound dirty    Keep up with wound care for one week or until the area is healed.    A small scab will form and fall off by itself when the area is completely healed. The area will be red and will become pink in color as it heals. Sun protection is very important for how your scar will turn out. Sunscreen with an SPF 30 or greater is recommended once the area is healed.    You should have some soreness but it should be mild and slowly go away over several days. Talk to your doctor about using tylenol for pain,    When should I call my doctor?  If you have increased:     Pain or swelling    Pus or drainage (clear or slightly yellow drainage is ok)    Temperature over 100F    Spreading redness or warmth around wound    When will I hear about my results?  The biopsy results can take 2-3 weeks to come back. The clinic will call you with the results, send you a [employer] message, or have you schedule a follow-up clinic or phone time to discuss the results. Contact our clinics if you do not hear from us in 3 weeks.     Who should I call with questions?    [facility]: [phone]     [facility]: [phone]    For urgent needs outside of business hours call the [facility] at [phone] and ask for the dermatology  resident on call

## 2019-06-25 NOTE — PROGRESS NOTES
"[facility] Dermatology Note      Dermatology Problem List:  1. Solar elastosis, right zygomatic cheek inferior and superior, s/p biopsy 11/10/2015  2. NUB, right nasolabial fold, s/p biopsy 6/25/2019    Encounter Date: Jun 25, 2019    CC:  Chief Complaint   Patient presents with     Lesion     facial lesions         History of Present Illness:  Ms. Camelia Bangura is a 74 year old female who presents as a referral from Dr. Lundberg for spot of her face. She has a bump on her right cheek that has been there a year and half. Soemtimes off white material comes out of it. She thought it was a pimple but it never healed as she would expect. Her PCP thought it was a \"white mole.\"  She also has a spot of concern on her left cheek that has been presents for a while (years). Not tender, has not bled. No personal or family history of skin cancer. No other concerns addressed today.       Past Medical History:   Patient Active Problem List   Diagnosis     Dysplasia of cervix     Symptomatic menopausal or female climacteric states     Postmenopausal atrophic vaginitis     Candidiasis of vulva and vagina     HYPERLIPIDEMIA LDL GOAL <130     Chronic rhinitis     Health Care Home     Advanced directives, counseling/discussion     Eczema of both hands     Past Medical History:   Diagnosis Date     Arthritis      Contact dermatitis and other eczema, due to unspecified cause      Dysplasia of cervix, unspecified 2005     Past Surgical History:   Procedure Laterality Date     C APPENDECTOMY       HC CORRECT BUNION,SIMPLE  05/23/90    Right foot     HC REMOVAL OF TONSILS,<13 Y/O       HYSTERECTOMY, PAP NO LONGER INDICATED  4/05     HYSTERECTOMY, VAGINAL  4/05     SURGICAL HISTORY OF -   06/19/95    Bunion deformity & fractured tibial sesamoid left foot       Social History:  Patient reports that she has never smoked. She has never used smokeless tobacco. She reports that she drinks alcohol. She reports that she does " not use drugs. Lives in [place].     Family History:  No family history of skin cancer.     Medications:  Current Outpatient Medications   Medication Sig Dispense Refill     estradiol (CLIMARA) 0.05 MG/24HR WK patch APPLY ONE PATCH TO SKIN  ONCE A WEEK 4 patch 11     fluconazole (DIFLUCAN) 150 MG tablet Take 1 tablet (150 mg) by mouth every 3 days 4 tablet 6     fluticasone (FLONASE) 50 MCG/ACT spray Spray 1-2 sprays into both nostrils daily 48 g 11     IBUPROFEN PO Take 200 mg by mouth 2 tablets prn       simvastatin (ZOCOR) 40 MG tablet TAKE ONE TABLET AT       BEDTIME FOR CHOLESTEROL 90 tablet 3     triamcinolone (KENALOG) 0.1 % cream Apply  topically 2 times daily. 30 g 11       Allergies   Allergen Reactions     Seasonal Allergies        Review of Systems:  -Constitutional: Patient is otherwise feeling well, in usual state of health.   -Skin: As above in HPI. No additional skin concerns.    Physical exam:  Vitals: There were no vitals taken for this visit.  GEN: This is a well developed, well-nourished female in no acute distress, in a pleasant mood.    SKIN: Sun-exposed skin, which includes the head/face, neck, both arms, digits, and/or nails was examined.   - Rosenbaum Type I-II  - Scattered brown macules on sun exposed areas, especially right lateral cheek.  - There are yellow oily papules with central umbilication located on the right cheek, left cheek.  - right nasolabial fold, pink papule with central off white area with fine blood vessels in the area  - No other lesions of concern on areas examined.           Impression/Plan:  1. Sun damaged skin with solar lentigines    Recommend sunscreens SPF #30 or greater, protective clothing and avoidance of tanning beds.    2. Benign findings including: sebaceous hyperplasia     No further intervention required. Patient to report changes.     Patient reassured of the benign nature of these lesions.    Recommended an over the counter retinol like Oil of Olay  to see if this prevents new lesions from occuring.     3. NUB, right nasolabial fold. pink papule with central off white area with fine blood vessels in the area. Ddx: inflammatory papule vs nevus vs BCC    Shave biopsy:  After discussion of benefits and risks including but not limited to bleeding/bruising, pain/swelling, infection, scar, incomplete removal, nerve damage/numbness, recurrence, and non-diagnostic biopsy, written consent, verbal consent and photographs were obtained. Time-out was performed. The area was cleaned with isopropyl alcohol. 0.5ml of 1% lidocaine with 1:100,000 epinephrine was injected to obtain adequate anesthesia. A shave biopsy was performed. Hemostasis was achieved with aluminium chloride. Vaseline and a sterile dressing were applied. The patient tolerated the procedure and no complications were noted. The patient was provided with verbal and written post care instructions.       DORCAS Casas MD  Follow-up pending biopsy results.    Staff Involved:  Scribe/Staff    Scribe Disclosure  I, Yazmin Villagran, am serving as a scribe to document services personally performed by Dr. Annika Clark MD, based on data collection and the provider's statements to me.     Provider Disclosure:   The documentation recorded by the scribe accurately reflects the services I personally performed and the decisions made by me.    Annika Clark MD    Department of Dermatology  [facility]: Phone: [phone], Fax:[phone]  [facility]: Phone: [phone], Fax: [phone]

## 2019-06-25 NOTE — NURSING NOTE
@Camelia Bangura's goals for this visit include:   Chief Complaint   Patient presents with     Lesion     facial lesions       She requests these members of her care team be copied on today's visit information: NO    PCP: No Ref-Primary, Physician    Referring Provider:  CORNEL Casas MD  No address on file    There were no vitals taken for this visit.    Do you need any medication refills at today's visit? NO    Amina Nguyen [facility]

## 2019-07-01 LAB — COPATH REPORT: NORMAL

## 2019-07-02 ENCOUNTER — TELEPHONE (OUTPATIENT)
Dept: DERMATOLOGY | Facility: CLINIC | Age: 74
End: 2019-07-02

## 2019-07-02 NOTE — TELEPHONE ENCOUNTER
Notes recorded by Amina Nguyen CMA on 7/2/2019 at 8:36 AM CDT  Called patient and informed her of her results.  She didn't have any additional questions.    Amina Nguyen CMA    ------    Notes recorded by Annika Clark MD on 7/1/2019 at 5:27 PM CDT  Please let patient know biopsy showed an inflamed hair follicle with some bacteria in it. This is not a skin cancer. This is an entirely harmless lesion. Nothing further needs to be done. The biopsy should allow for the area to heal now.     Annika Clark MD    Department of Dermatology  [facility]: Phone: [phone], Fax:[phone]  [facility]: Phone: [phone], Fax: [phone]

## 2019-10-01 ENCOUNTER — HOSPITAL ENCOUNTER (OUTPATIENT)
Dept: MAMMOGRAPHY | Facility: CLINIC | Age: 74
Discharge: HOME | End: 2019-10-01
Attending: FAMILY MEDICINE | Admitting: FAMILY MEDICINE
Payer: COMMERCIAL

## 2019-10-01 DIAGNOSIS — Z12.31 VISIT FOR SCREENING MAMMOGRAM: ICD-10-CM

## 2019-10-01 PROCEDURE — 77063 BREAST TOMOSYNTHESIS BI: CPT

## 2019-10-31 ENCOUNTER — IMMUNIZATION (OUTPATIENT)
Dept: FAMILY MEDICINE | Facility: CLINIC | Age: 74
End: 2019-10-31
Payer: COMMERCIAL

## 2019-10-31 DIAGNOSIS — Z23 NEED FOR PROPHYLACTIC VACCINATION AND INOCULATION AGAINST INFLUENZA: Primary | ICD-10-CM

## 2019-10-31 PROCEDURE — 99207 ZZC NO CHARGE NURSE ONLY: CPT

## 2019-10-31 PROCEDURE — G0008 ADMIN INFLUENZA VIRUS VAC: HCPCS

## 2019-10-31 PROCEDURE — 90662 IIV NO PRSV INCREASED AG IM: CPT

## 2019-12-27 ENCOUNTER — TELEPHONE (OUTPATIENT)
Dept: FAMILY MEDICINE | Facility: CLINIC | Age: 74
End: 2019-12-27

## 2019-12-27 DIAGNOSIS — E78.5 HYPERLIPIDEMIA LDL GOAL <130: ICD-10-CM

## 2019-12-27 RX ORDER — SIMVASTATIN 40 MG
TABLET ORAL
Qty: 30 TABLET | Refills: 0 | Status: SHIPPED | OUTPATIENT
Start: 2019-12-27 | End: 2020-01-28

## 2019-12-27 NOTE — TELEPHONE ENCOUNTER
Medication is being filled for 1 time refill only due to:  Pt overdue for office visit and fasting labs Reminder placed on pharmacy notes, no further refills till is seen and has fasting labs.    KIRSTEN Be

## 2019-12-27 NOTE — TELEPHONE ENCOUNTER
Panel Management Review      Patient has the following on her problem list: None      Composite cancer screening  Chart review shows that this patient is due/due soon for the following Fecal Colorectal (FIT)  Summary:    Patient is due/failing the following:   FIT    Action needed:   Patient needs referral/order: fit    Type of outreach:    Phone, spoke to patient.  will be establishing care with Dr. Mckenna soon and will  kit at that time.     Questions for provider review:    None                                                                                                                                    Valery Basurto MA       Chart routed to Care Team .

## 2020-01-07 ENCOUNTER — HOSPITAL ENCOUNTER (EMERGENCY)
Facility: CLINIC | Age: 75
Discharge: HOME | End: 2020-01-07
Attending: NURSE PRACTITIONER | Admitting: NURSE PRACTITIONER
Payer: COMMERCIAL

## 2020-01-07 VITALS
OXYGEN SATURATION: 99 % | HEIGHT: 60 IN | TEMPERATURE: 97.5 F | BODY MASS INDEX: 29.45 KG/M2 | RESPIRATION RATE: 18 BRPM | WEIGHT: 150 LBS | HEART RATE: 77 BPM | DIASTOLIC BLOOD PRESSURE: 73 MMHG | SYSTOLIC BLOOD PRESSURE: 131 MMHG

## 2020-01-07 DIAGNOSIS — K13.79 MOUTH SORES: ICD-10-CM

## 2020-01-07 PROCEDURE — G0463 HOSPITAL OUTPT CLINIC VISIT: HCPCS | Performed by: NURSE PRACTITIONER

## 2020-01-07 PROCEDURE — 99214 OFFICE O/P EST MOD 30 MIN: CPT | Mod: Z6 | Performed by: NURSE PRACTITIONER

## 2020-01-07 RX ORDER — VALACYCLOVIR HYDROCHLORIDE 1 G/1
1000 TABLET, FILM COATED ORAL 2 TIMES DAILY
Qty: 20 TABLET | Refills: 0 | Status: ON HOLD | OUTPATIENT
Start: 2020-01-07 | End: 2023-02-24

## 2020-01-07 ASSESSMENT — MIFFLIN-ST. JEOR: SCORE: 1111.9

## 2020-01-07 NOTE — DISCHARGE INSTRUCTIONS
Start valtrex and take twice daily for 5 days -  Start magic mouthwash for pain and use this for comfort.

## 2020-01-07 NOTE — ED PROVIDER NOTES
"  History     Chief Complaint   Patient presents with     Mouth Problem     pt concerned for thrush or other oral infection. moth pain and slight sweling     HPI  Camelia Bangura is a 74 year old female who present with concerns of swollen lips, tingling, sores on lips.  Pt states onset within the past 10 days ago and is getting progressively worse over the past 24 hours.  Pt has tendency to get yeast infections.  Patient also does admit to a history of cold sores but states she normally gets them up around her nares.  Patient usually self manages and has never taken antiviral therapy for this.    Pt able to eat, drink, swallow without difficulty, denies breathing difficulty, shortness of air, fever, aches, chills, swats, abdominal pain, nausea, vomiting, diarrhea, dysuria, mental confusion, speech difficulty, thoughts of harming self.    Allergies:  Allergies   Allergen Reactions     Seasonal Allergies        Problem List:    Patient Active Problem List    Diagnosis Date Noted     Eczema of both hands 09/08/2016     Priority: Medium     Advanced directives, counseling/discussion 08/09/2012     Priority: Medium     Patient does not have an Advance/Health Care Directive (HCD), given \"What is Advance Care Planning?\" flyer.    Afia Mckeon  August 9, 2012         Health Care Home 06/13/2012     Priority: Medium     Rebecca Guzman RN-PHN  FPA / [facility]   [phone]    DX V65.8 REPLACED WITH [id number] HEALTH CARE HOME (04/08/2013)       Chronic rhinitis 06/30/2011     Priority: Medium     HYPERLIPIDEMIA LDL GOAL <130 10/31/2010     Priority: Medium     Postmenopausal atrophic vaginitis 10/10/2007     Priority: Medium     Candidiasis of vulva and vagina 10/10/2007     Priority: Medium     Dysplasia of cervix 05/02/2005     Priority: Medium     S/p TVH; needs yearly pap testing  [id number]  Problem list name updated by automated process. Provider to review       Symptomatic menopausal " or female climacteric states 05/02/2005     Priority: Medium        Past Medical History:    Past Medical History:   Diagnosis Date     Arthritis      Contact dermatitis and other eczema, due to unspecified cause      Dysplasia of cervix, unspecified 2005       Past Surgical History:    Past Surgical History:   Procedure Laterality Date     C APPENDECTOMY       HC CORRECT BUNION,SIMPLE  05/23/90    Right foot     HC REMOVAL OF TONSILS,<13 Y/O       HYSTERECTOMY, PAP NO LONGER INDICATED  4/05     HYSTERECTOMY, VAGINAL  4/05     SURGICAL HISTORY OF -   06/19/95    Bunion deformity & fractured tibial sesamoid left foot       Family History:    Family History   Problem Relation Age of Onset     Cerebrovascular Disease Mother      Arthritis Mother      Osteoporosis Mother      Melanoma No family hx of        Social History:  Marital Status:   [2]  Social History     Tobacco Use     Smoking status: Never Smoker     Smokeless tobacco: Never Used   Substance Use Topics     Alcohol use: Yes     Comment: occasional wine     Drug use: No        Medications:    magic mouthwash (ENTER INGREDIENTS IN COMMENTS) suspension  valACYclovir (VALTREX) 1000 mg tablet  estradiol (CLIMARA) 0.05 MG/24HR WK patch  fluconazole (DIFLUCAN) 150 MG tablet  fluticasone (FLONASE) 50 MCG/ACT spray  IBUPROFEN PO  simvastatin (ZOCOR) 40 MG tablet  triamcinolone (KENALOG) 0.1 % cream      Review of Systems  As mentioned above in the history present illness. All other systems were reviewed and are negative.    Physical Exam   BP: 131/73  Pulse: 77  Heart Rate: 77  Temp: 97.5  F (36.4  C)  Resp: 18  Height: 152.4 cm (5')  Weight: 68 kg (150 lb)  SpO2: 99 %      Physical Exam  Vitals signs and nursing note reviewed.   Constitutional:       General: She is not in acute distress.     Appearance: She is well-developed. She is not diaphoretic.   HENT:      Head: Normocephalic and atraumatic.      Right Ear: Tympanic membrane, ear canal and external ear  normal.      Left Ear: Tympanic membrane, ear canal and external ear normal.      Nose: Nose normal. No congestion or rhinorrhea.      Mouth/Throat:      Lips: Pink. Lesions (bilateral microvesicular lesions noted on lips without pustular drainage, weeping.  There is trace swelling of the lips.  ) present.      Mouth: Mucous membranes are moist. No injury, lacerations, oral lesions or angioedema.      Tongue: No lesions.      Pharynx: Oropharynx is clear. Uvula midline. No pharyngeal swelling, oropharyngeal exudate, posterior oropharyngeal erythema or uvula swelling.   Eyes:      General:         Right eye: No discharge.         Left eye: No discharge.      Conjunctiva/sclera: Conjunctivae normal.   Cardiovascular:      Rate and Rhythm: Normal rate and regular rhythm.      Heart sounds: Normal heart sounds. No murmur. No friction rub.   Pulmonary:      Effort: Pulmonary effort is normal. No respiratory distress.      Breath sounds: Normal breath sounds. No stridor. No wheezing or rales.   Chest:      Chest wall: No tenderness.   Skin:     General: Skin is warm and dry.      Coloration: Skin is not pale.      Findings: No erythema or rash.   Neurological:      Mental Status: She is alert.         ED Course        Procedures    No results found for this or any previous visit (from the past 24 hour(s)).    Medications - No data to display    Assessments & Plan (with Medical Decision Making)  Camelia Bangura is a 74 year old female who present with concerns of swollen lips, tingling, sores on lips.  Pt states onset within the past 10 days ago and is getting progressively worse over the past 24 hours.  Pt has tendency to get yeast infections.  Patient also does admit to a history of cold sores but states she normally gets them up around her nares.  Patient usually self manages and has never taken antiviral therapy for this.  Patient reports that she is almost certain that she has thrush.  Patient denies any recent  antibiotics.  Patient denies utilizing inhaled steroids or any thrush inducing type medications.  On examination concern for HSV lesions.  Reviewed this with patient.  Will initiate treatment for HSV and also given Magic mouthwash for patient due to concern for thrush.  Encourage follow-up if no improvement in symptoms.  Patient discharged in stable condition peer     I have reviewed the nursing notes.    I have reviewed the findings, diagnosis, plan and need for follow up with the patient.    Discharge Medication List as of 1/7/2020  4:19 PM      START taking these medications    Details   magic mouthwash (ENTER INGREDIENTS IN COMMENTS) suspension Take 15 mLs by mouth every 6 hours as needed (sore mouth, swelling), Disp-230 mL, R-0, Local Print      valACYclovir (VALTREX) 1000 mg tablet Take 1 tablet (1,000 mg) by mouth 2 times daily for 10 days, Disp-20 tablet, R-0, E-Prescribe             Final diagnoses:   Mouth sores       1/7/2020   [facility] EMERGENCY DEPARTMENT     Serena Hernandes, KRISTIN CNP  01/07/20 [id number]

## 2020-01-07 NOTE — ED AVS SNAPSHOT
[facility]  [address] [address]  Phone:  [phone]  Fax:  [phone]                                    Camelia Bangura   MRN: [id number]    Department:  [facility]   Date of Visit:  1/7/2020           After Visit Summary Signature Page    I have received my discharge instructions, and my questions have been answered. I have discussed any challenges I see with this plan with the nurse or doctor.    ..........................................................................................................................................  Patient/Patient Representative Signature      ..........................................................................................................................................  Patient Representative Print Name and Relationship to Patient    ..................................................               ................................................  Date                                   Time    ..........................................................................................................................................  Reviewed by Signature/Title    ...................................................              ..............................................  Date                                               Time          22EPIC Rev 08/18

## 2020-01-26 DIAGNOSIS — E78.5 HYPERLIPIDEMIA LDL GOAL <130: ICD-10-CM

## 2020-01-27 NOTE — TELEPHONE ENCOUNTER
"Requested Prescriptions   Pending Prescriptions Disp Refills     simvastatin (ZOCOR) 40 MG tablet [Pharmacy Med Name: SIMVASTATIN 40 MG TABLET] 30 tablet 0     Sig: TAKE ONE TABLET AT BEDTIME FOR CHOLESTEROL       Statins Protocol Failed - 1/26/2020  7:59 AM        Failed - LDL on file in past 12 months     Recent Labs   Lab Test 02/07/18  0949   LDL 66             Passed - No abnormal creatine kinase in past 12 months     No lab results found.             Passed - Recent (12 mo) or future (30 days) visit within the authorizing provider's specialty     Patient has had an office visit with the authorizing provider or a provider within the authorizing providers department within the previous 12 mos or has a future within next 30 days. See \"Patient Info\" tab in inbasket, or \"Choose Columns\" in Meds & Orders section of the refill encounter.              Passed - Medication is active on med list        Passed - Patient is age 18 or older        Passed - No active pregnancy on record        Passed - No positive pregnancy test in past 12 months        Last Written Prescription Date:  12/27/20119  Last Fill Quantity: 30,  # refills: 0   Last office visit: 10/31/2018 with prescribing provider:  Post  1/31/2019 with Janes    Future Office Visit:   Next 5 appointments (look out 90 days)    Feb 03, 2020  2:20 PM CST  SHORT with Chaya Mckenna MD  [facility] ([facility] [id number]  [facility] [address]  [phone]           "

## 2020-01-28 ENCOUNTER — OFFICE VISIT (OUTPATIENT)
Dept: FAMILY MEDICINE | Facility: CLINIC | Age: 75
End: 2020-01-28
Payer: COMMERCIAL

## 2020-01-28 VITALS
WEIGHT: 150 LBS | TEMPERATURE: 97.1 F | SYSTOLIC BLOOD PRESSURE: 106 MMHG | HEART RATE: 71 BPM | OXYGEN SATURATION: 98 % | RESPIRATION RATE: 19 BRPM | DIASTOLIC BLOOD PRESSURE: 74 MMHG | BODY MASS INDEX: 29.45 KG/M2 | HEIGHT: 60 IN

## 2020-01-28 DIAGNOSIS — B37.31 YEAST VAGINITIS: ICD-10-CM

## 2020-01-28 DIAGNOSIS — L30.9 ECZEMA OF BOTH HANDS: ICD-10-CM

## 2020-01-28 DIAGNOSIS — K13.0 CHEILITIS: Primary | ICD-10-CM

## 2020-01-28 DIAGNOSIS — Z12.11 SPECIAL SCREENING FOR MALIGNANT NEOPLASMS, COLON: ICD-10-CM

## 2020-01-28 DIAGNOSIS — E78.5 HYPERLIPIDEMIA LDL GOAL <130: ICD-10-CM

## 2020-01-28 DIAGNOSIS — J31.0 CHRONIC RHINITIS: ICD-10-CM

## 2020-01-28 PROCEDURE — 99214 OFFICE O/P EST MOD 30 MIN: CPT | Performed by: FAMILY MEDICINE

## 2020-01-28 RX ORDER — TRIAMCINOLONE ACETONIDE 0.1 %
PASTE (GRAM) DENTAL 2 TIMES DAILY
Qty: 5 G | Refills: 0 | Status: SHIPPED | OUTPATIENT
Start: 2020-01-28 | End: 2022-10-17

## 2020-01-28 RX ORDER — FLUTICASONE PROPIONATE 50 MCG
1-2 SPRAY, SUSPENSION (ML) NASAL DAILY
Qty: 48 G | Refills: 11 | Status: SHIPPED | OUTPATIENT
Start: 2020-01-28 | End: 2021-04-22

## 2020-01-28 RX ORDER — TRIAMCINOLONE ACETONIDE 1 MG/G
CREAM TOPICAL
Qty: 30 G | Refills: 11 | Status: SHIPPED | OUTPATIENT
Start: 2020-01-28

## 2020-01-28 RX ORDER — FLUCONAZOLE 150 MG/1
150 TABLET ORAL
Qty: 4 TABLET | Refills: 6 | Status: SHIPPED | OUTPATIENT
Start: 2020-01-28 | End: 2021-07-13

## 2020-01-28 RX ORDER — SIMVASTATIN 40 MG
TABLET ORAL
Qty: 90 TABLET | Refills: 3 | Status: SHIPPED | OUTPATIENT
Start: 2020-01-28 | End: 2021-02-01

## 2020-01-28 RX ORDER — SIMVASTATIN 40 MG
TABLET ORAL
Qty: 30 TABLET | Refills: 0 | OUTPATIENT
Start: 2020-01-28

## 2020-01-28 ASSESSMENT — MIFFLIN-ST. JEOR: SCORE: 1111.9

## 2020-01-28 NOTE — PATIENT INSTRUCTIONS
Thank you for choosing [facility].  You may be receiving an email and/or telephone survey request from [facility] regarding your visit today.  Please take a few minutes to respond to the survey to let us know how we are doing.      If you have questions or concerns, please contact us via [facility] or you can contact your care team at [phone].    Our Clinic hours are:  Monday 6:40 am  to 7:00 pm  Tuesday -Friday 6:40 am to 5:00 pm    The [place] outpatient lab hours are:  Monday - Friday 6:10 am to 4:45 pm  Saturdays 7:00 am to 11:00 am  Appointments are required, call [phone]    If you have clinical questions after hours or would like to schedule an appointment,  call the clinic at [phone].  Patient Education     Understanding Cold Sores  Cold sores, which are also called fever blisters, are small blisters or sores on the lip or sometimes inside the mouth. Many people get them from time to time. Cold sores usually are not serious, and they usually heal in a few days, sometimes longer. They are caused by 2 related viruses, herpes simplex type 1 and 2. These viruses spread very easily. Many people have one or both of these viruses in their body. More than 4 in every 5 people are infected with herpes simplex type 1 and this is the most common cause of cold sores. Once you have the virus that causes cold sores, it stays in your body for the rest of your life. But it can be inactive for long periods.  What causes a cold sore?  Cold sores are usually caused by herpes simplex virus type 1. Less often, they are caused by herpes simplex virus type 2. Herpes simplex virus type 2 is the more common cause of genital sores. The herpes viruses can enter the body through a break in the skin such as a scrape. Or they may enter through mucous membranes such as the lips or mouth. Some ways to get the viruses include:    Kissing someone who has a cold sore    Sharing a drinking glass,  eating utensils, or lip balm with someone who has a cold sore    Having sex with someone who has a cold sore  A  baby can also get the infection at birth.  If you have a herpes virus, you can to pass it along even when you don t have a sore.  Cold sores flare up occasionally. Things that can cause an outbreak include:    Sun exposure    Fever    Stress or exhaustion    Menstruation    Skin irritation    Another unrelated Illness such as pneumonia, urinary infection, or cancer  What are the symptoms of a cold sore?  Symptoms can include:    A blister-like sore or cluster of sores. These often occur at the edge of the lips but may appear inside the mouth.    Skin redness around the sores.    Pain or itching in the area of the outbreak. Often the pain or itching develops 12 to 24 hours before the sore become visible.    Flu-like symptoms, including swollen glands, headache, body ache, or fever. These typically occur only at the time of the first infection.  Cold sores may also occur on fingers. They may rarely infect the eyes, a serious possible complication.  Some people have symptoms a day or two before an outbreak. They may feel tenderness, burning, itching, or tingling before a cold sore appears. Cold sores tend to come back in the same area that they first appeared.  How are cold sores treated?  Treatment for cold sores focuses on relieving and shortening symptoms. For people with frequent outbreaks, treatment works to decrease how often and how symptomatic future episodes will be.  Treatments may include:    Prescription or over-the-counter pain medicines. These can help with discomfort, especially if sores are inside the mouth.    Antiviral medicines. These may be pills that are taken by mouth or a cream to apply to sores. They may help shorten an outbreak and reduce the severity of symptoms.  They may be used to help prevent future outbreaks if you have bothersome recurrent infections.    Self-care such  as extra rest and drinking more fluids. These may help relieve the flu-like symptoms of a first outbreak.  How are cold sores diagnosed?  Your healthcare provider makes the diagnosis mainly by looking at the sores and doing a clinical exam.  This may be confirmed by swab tests or blood tests.  How can I prevent cold sores?  You can help reduce the spread of the herpes viruses that cause cold sores. This can help both you and others avoid getting cold sores. Follow these tips:    Do not kiss others if you have a cold sore. Also avoid kissing someone with a cold sore.    Do not share eating utensils, lip balm, razors, or towels with someone who has a cold sore.    Wash your hands after touching the area of a cold sore. The herpes virus can be carried from your face to your hands when you touch the area of a cold sore. When this happens, wash your hands thoroughly, for at least 20 seconds. When you can t wash with soap and water, use an alcohol-based hand .    Disinfect things you touch often, such as phones and keyboards.      If you feel a cold sore coming on, do the same things you would do when a cold sore is present to avoid spreading the virus.    Use condoms to help prevent passing on the viruses through sex.  What are the possible complications of a cold sore?  Cold sores usually go away by themselves within a few days, occasionally 1-2 weeks or longer. For most people cold sores are not serious. The viruses that cause cold sores can cause more serious illness, though. People who have a weak immune system may get more serious infections from herpes viruses. These include people being treated for cancer or who have HIV disease. Babies may also become very ill from a herpes infection.   When should I call my healthcare provider?  Call your healthcare provider right away if you have any of these:    Fever of 100.4 F (38 C) or higher, or as directed    Pain that gets worse    You cannot eat or drink  because of painful sores    Symptoms don t get better within 5 to 7 days    Blisters spread beyond the mouth or lip to areas on the chest, arms, face (especially the eyes), or legs  Date Last Reviewed: 3/28/2016    9758-7698 [employer]. [address] [place] [address]. All rights reserved. This information is not intended as a substitute for professional medical care. Always follow your healthcare professional's instructions.

## 2020-01-28 NOTE — PROGRESS NOTES
Subjective     Camelia Bangura is a 74 year old female who presents to clinic today for the following health issues:    74 yr old female here for follow up on mouth sores, she was seen in the UC a few days ago for same and prescribed valtrex and also given some cream . She says this has not helped. Rash is  More pronounced around the lips on the edge of the lips.  Patient reports that she gets this at least once a year. This time it appears severe.     Patient is also in need of medication refills. She needs her eczema cream and also her allergy medication refilled. Symptoms appear stable.    HPI   ED[place]    [place] [facility] [place]: 1[place]7-20  Reason for visit: mouth sores   Current Status: Patient is still having trouble        Concern - yrs ago but increased in the last mo   Onset: 1-2020    Description:   Patient has been getting sores in her mouth and the outside with swelling in her lips and redness around her mouth. She was seen in UC on 1-7 and was given the magic mouth was and valtrex     Intensity: moderate    Progression of Symptoms:  same    Accompanying Signs & Symptoms:  Patient has had a cold     Previous history of similar problem:   Yes     Precipitating factors:   Worsened by: cold weather and possibly her cold, and certain foods     Alleviating factors:  Improved by: Vaseline and the magic mouth wash      Therapies Tried and outcome: Patient was seen in UC and was given valtrex and magic mouthwash     Patient Active Problem List   Diagnosis     Dysplasia of cervix     Symptomatic menopausal or female climacteric states     Postmenopausal atrophic vaginitis     Candidiasis of vulva and vagina     HYPERLIPIDEMIA LDL GOAL <130     Chronic rhinitis     Health Care Home     Advanced directives, counseling/discussion     Eczema of both hands     Past Surgical History:   Procedure Laterality Date     C APPENDECTOMY       HC CORRECT BUNION,SIMPLE  05/23/90    Right foot     HC REMOVAL OF  TONSILS,<13 Y/O       HYSTERECTOMY, PAP NO LONGER INDICATED  4/05     HYSTERECTOMY, VAGINAL  4/05     SURGICAL HISTORY OF -   06/19/95    Bunion deformity & fractured tibial sesamoid left foot       Social History     Tobacco Use     Smoking status: Never Smoker     Smokeless tobacco: Never Used   Substance Use Topics     Alcohol use: Yes     Comment: occasional wine     Family History   Problem Relation Age of Onset     Cerebrovascular Disease Mother      Arthritis Mother      Osteoporosis Mother      Melanoma No family hx of          Current Outpatient Medications   Medication Sig Dispense Refill     estradiol (CLIMARA) 0.05 MG/24HR WK patch APPLY ONE PATCH TO SKIN  ONCE A WEEK 4 patch 11     fluconazole (DIFLUCAN) 150 MG tablet Take 1 tablet (150 mg) by mouth every 3 days 4 tablet 6     fluticasone (FLONASE) 50 MCG/ACT nasal spray Spray 1-2 sprays into both nostrils daily 48 g 11     IBUPROFEN PO Take 200 mg by mouth 2 tablets prn       magic mouthwash (ENTER INGREDIENTS IN COMMENTS) suspension Take 15 mLs by mouth every 6 hours as needed (sore mouth, swelling) 230 mL 0     simvastatin (ZOCOR) 40 MG tablet TAKE ONE TABLET AT       BEDTIME FOR CHOLESTEROL 90 tablet 3     triamcinolone (KENALOG) 0.1 % external cream Apply  topically 2 times daily. 30 g 11     triamcinolone (KENALOG) 0.1 % paste Take by mouth 2 times daily 5 g 0     valACYclovir (VALTREX) 1000 mg tablet Take 1 tablet (1,000 mg) by mouth 2 times daily for 10 days 20 tablet 0     Allergies   Allergen Reactions     Seasonal Allergies      BP Readings from Last 3 Encounters:   01/28/20 106/74   01/07/20 131/73   01/31/19 110/70    Wt Readings from Last 3 Encounters:   01/28/20 68 kg (150 lb)   01/07/20 68 kg (150 lb)   12/10/13 75.8 kg (167 lb)                  Patient would like the diflucan she uses this as needed for a preventative  and Estradial Patch   Medication Followup of all medication listed in      Taking Medication as prescribed:  yes    Side Effects:  None    Medication Helping Symptoms:  yes     Hyperlipidemia Follow-Up      Are you regularly taking any medication or supplement to lower your cholesterol?   Yes- simvastastin    Are you having muscle aches or other side effects that you think could be caused by your cholesterol lowering medication?  No    Reviewed and updated as needed this visit by Provider  Tobacco  Allergies  Meds  Problems  Med Hx  Surg Hx  Fam Hx         Review of Systems   ROS COMP: Constitutional, HEENT, cardiovascular, pulmonary, gi and gu systems are negative, except as otherwise noted.      Objective    /74   Pulse 71   Temp 97.1  F (36.2  C) (Tympanic)   Resp 19   Ht 1.524 m (5')   Wt 68 kg (150 lb)   SpO2 98%   BMI 29.29 kg/m    Body mass index is 29.29 kg/m .  Physical Exam   GENERAL: healthy, alert and no distress  EYES: Eyes grossly normal to inspection, PERRL and conjunctivae and sclerae normal  HENT: ear canals and TM's normal, nose and mouth without ulcers or lesions  NECK: no adenopathy, no asymmetry, masses, or scars and thyroid normal to palpation  RESP: lungs clear to auscultation - no rales, rhonchi or wheezes  CV: regular rate and rhythm, normal S1 S2, no S3 or S4, no murmur, click or rub, no peripheral edema and peripheral pulses strong  ABDOMEN: soft, nontender, no hepatosplenomegaly, no masses and bowel sounds normal  MS: no gross musculoskeletal defects noted, no edema  SKIN: macule - lips    Diagnostic Test Results:  none         Assessment & Plan     1. Cheilitis  Patient was reassured, asked that she try the paste, if no improvement to contact the clinic .  - triamcinolone (KENALOG) 0.1 % paste; Take by mouth 2 times daily  Dispense: 5 g; Refill: 0    2. Hyperlipidemia LDL goal <130  Medication refilled.   - simvastatin (ZOCOR) 40 MG tablet; TAKE ONE TABLET AT       BEDTIME FOR CHOLESTEROL  Dispense: 90 tablet; Refill: 3  - Lipid panel reflex to direct LDL Fasting;  Future    3. Chronic rhinitis  Symptoms stable - medication refilled.  - fluticasone (FLONASE) 50 MCG/ACT nasal spray; Spray 1-2 sprays into both nostrils daily  Dispense: 48 g; Refill: 11    4. Eczema of both hands  Medication refilled.   - triamcinolone (KENALOG) 0.1 % external cream; Apply  topically 2 times daily.  Dispense: 30 g; Refill: 11    5. Yeast vaginitis  Medication faxed     6. Special screening for malignant neoplasms, colon  - Fecal colorectal cancer screen (FIT); Future             FUTURE APPOINTMENTS:       - Follow-up visit in one month or sooner as needed.  Patient Instructions         Thank you for choosing [facility].  You may be receiving an email and/or telephone survey request from [facility] [employer] regarding your visit today.  Please take a few minutes to respond to the survey to let us know how we are doing.      If you have questions or concerns, please contact us via [employer] or you can contact your care team at [phone].    Our Clinic hours are:  Monday 6:40 am  to 7:00 pm  Tuesday -Friday 6:40 am to 5:00 pm    The [place] outpatient lab hours are:  Monday - Friday 6:10 am to 4:45 pm  Saturdays 7:00 am to 11:00 am  Appointments are required, call [phone]    If you have clinical questions after hours or would like to schedule an appointment,  call the clinic at [phone].  Patient Education     Understanding Cold Sores  Cold sores, which are also called fever blisters, are small blisters or sores on the lip or sometimes inside the mouth. Many people get them from time to time. Cold sores usually are not serious, and they usually heal in a few days, sometimes longer. They are caused by 2 related viruses, herpes simplex type 1 and 2. These viruses spread very easily. Many people have one or both of these viruses in their body. More than 4 in every 5 people are infected with herpes simplex type 1 and this is the most common cause of cold sores. Once you have  the virus that causes cold sores, it stays in your body for the rest of your life. But it can be inactive for long periods.  What causes a cold sore?  Cold sores are usually caused by herpes simplex virus type 1. Less often, they are caused by herpes simplex virus type 2. Herpes simplex virus type 2 is the more common cause of genital sores. The herpes viruses can enter the body through a break in the skin such as a scrape. Or they may enter through mucous membranes such as the lips or mouth. Some ways to get the viruses include:    Kissing someone who has a cold sore    Sharing a drinking glass, eating utensils, or lip balm with someone who has a cold sore    Having sex with someone who has a cold sore  A  baby can also get the infection at birth.  If you have a herpes virus, you can to pass it along even when you don t have a sore.  Cold sores flare up occasionally. Things that can cause an outbreak include:    Sun exposure    Fever    Stress or exhaustion    Menstruation    Skin irritation    Another unrelated Illness such as pneumonia, urinary infection, or cancer  What are the symptoms of a cold sore?  Symptoms can include:    A blister-like sore or cluster of sores. These often occur at the edge of the lips but may appear inside the mouth.    Skin redness around the sores.    Pain or itching in the area of the outbreak. Often the pain or itching develops 12 to 24 hours before the sore become visible.    Flu-like symptoms, including swollen glands, headache, body ache, or fever. These typically occur only at the time of the first infection.  Cold sores may also occur on fingers. They may rarely infect the eyes, a serious possible complication.  Some people have symptoms a day or two before an outbreak. They may feel tenderness, burning, itching, or tingling before a cold sore appears. Cold sores tend to come back in the same area that they first appeared.  How are cold sores treated?  Treatment for cold  sores focuses on relieving and shortening symptoms. For people with frequent outbreaks, treatment works to decrease how often and how symptomatic future episodes will be.  Treatments may include:    Prescription or over-the-counter pain medicines. These can help with discomfort, especially if sores are inside the mouth.    Antiviral medicines. These may be pills that are taken by mouth or a cream to apply to sores. They may help shorten an outbreak and reduce the severity of symptoms.  They may be used to help prevent future outbreaks if you have bothersome recurrent infections.    Self-care such as extra rest and drinking more fluids. These may help relieve the flu-like symptoms of a first outbreak.  How are cold sores diagnosed?  Your healthcare provider makes the diagnosis mainly by looking at the sores and doing a clinical exam.  This may be confirmed by swab tests or blood tests.  How can I prevent cold sores?  You can help reduce the spread of the herpes viruses that cause cold sores. This can help both you and others avoid getting cold sores. Follow these tips:    Do not kiss others if you have a cold sore. Also avoid kissing someone with a cold sore.    Do not share eating utensils, lip balm, razors, or towels with someone who has a cold sore.    Wash your hands after touching the area of a cold sore. The herpes virus can be carried from your face to your hands when you touch the area of a cold sore. When this happens, wash your hands thoroughly, for at least 20 seconds. When you can t wash with soap and water, use an alcohol-based hand .    Disinfect things you touch often, such as phones and keyboards.      If you feel a cold sore coming on, do the same things you would do when a cold sore is present to avoid spreading the virus.    Use condoms to help prevent passing on the viruses through sex.  What are the possible complications of a cold sore?  Cold sores usually go away by themselves within a  few days, occasionally 1-2 weeks or longer. For most people cold sores are not serious. The viruses that cause cold sores can cause more serious illness, though. People who have a weak immune system may get more serious infections from herpes viruses. These include people being treated for cancer or who have HIV disease. Babies may also become very ill from a herpes infection.   When should I call my healthcare provider?  Call your healthcare provider right away if you have any of these:    Fever of 100.4 F (38 C) or higher, or as directed    Pain that gets worse    You cannot eat or drink because of painful sores    Symptoms don t get better within 5 to 7 days    Blisters spread beyond the mouth or lip to areas on the chest, arms, face (especially the eyes), or legs  Date Last Reviewed: 3/28/2016    4047-2178 [employer]. [address] [place] [address]. All rights reserved. This information is not intended as a substitute for professional medical care. Always follow your healthcare professional's instructions.               Return in about 4 weeks (around 2/25/2020) for Routine Visit.    Tracey Pitts MD  [facility]

## 2020-02-04 DIAGNOSIS — E78.5 HYPERLIPIDEMIA LDL GOAL <130: ICD-10-CM

## 2020-02-04 DIAGNOSIS — Z12.11 SPECIAL SCREENING FOR MALIGNANT NEOPLASMS, COLON: ICD-10-CM

## 2020-02-04 LAB
CHOLEST SERPL-MCNC: 147 MG/DL
HDLC SERPL-MCNC: 58 MG/DL
LDLC SERPL CALC-MCNC: 67 MG/DL
NONHDLC SERPL-MCNC: 89 MG/DL
TRIGL SERPL-MCNC: 112 MG/DL

## 2020-02-04 PROCEDURE — 36415 COLL VENOUS BLD VENIPUNCTURE: CPT | Performed by: FAMILY MEDICINE

## 2020-02-04 PROCEDURE — 80061 LIPID PANEL: CPT | Performed by: FAMILY MEDICINE

## 2020-02-04 PROCEDURE — 82274 ASSAY TEST FOR BLOOD FECAL: CPT | Performed by: FAMILY MEDICINE

## 2020-02-04 NOTE — LETTER
February 6, 2020      Camelia Bangura  [address] [place] [id number]        Dear ,    We are writing to inform you of your test results.    Stool FIT Test Was Negative.  Component      Latest Ref Rng & Units 2/4/2020   Occult Blood Scn FIT      NEG:Negative Negative       If you have any questions or concerns, please call the clinic at the number listed above.       Sincerely,      Dr. Pitts

## 2020-02-05 LAB — HEMOCCULT STL QL IA: NEGATIVE

## 2020-02-05 NOTE — RESULT ENCOUNTER NOTE
Please inform patient that test result was within normal parameters.   Thank you.     Tracey Pitts M.D.

## 2020-02-06 NOTE — RESULT ENCOUNTER NOTE
Please inform patient that test result (FIT test for colon cancer screening ) was negative.   Thank you.     Tracey Pitts M.D.

## 2020-03-13 ENCOUNTER — TELEPHONE (OUTPATIENT)
Dept: OBGYN | Facility: CLINIC | Age: 75
End: 2020-03-13

## 2020-03-13 NOTE — TELEPHONE ENCOUNTER
Reason for Call:  Other     Detailed comments: Pt's appt on 03/16 had to be rescheduled to 04/27. Wants to discuss with the care team - no further information provided.     Phone Number Patient can be reached at: Home number on file [phone] (home)    Best Time: Any    Can we leave a detailed message on this number? YES    Call taken on 3/13/2020 at 1:47 PM by Denise Behrendt

## 2020-03-13 NOTE — TELEPHONE ENCOUNTER
Pt had appt on Monday March the 16th but received a call that Dr. Yost is out ill and appt was rescheduled for the end of April.                                                                                        She reports that she had a hysterectomy about 15 yrs ago and has been treating her hot flashes with Climara patch.  She established care with Dr. Pitts recently and she had discussed the Climara patch and she did not want her on this treatment given her age.    Pt reports that she is having hot flashes so bad at night that she only gets about 2 hours of sleep.  She wakes up with hot flashes and heart pounding, she reports this is really wearing her down.      She reports she has low tolerance to any medications that cause nausea and dizziness so she has to be careful of what she takes.  Gabapentin did not work because it caused nausea.    Gave pt future appt on 04-06-20 with Dr. Yost.    Josefina Rubio  [facility] RN

## 2020-04-06 ENCOUNTER — VIRTUAL VISIT (OUTPATIENT)
Dept: OBGYN | Facility: CLINIC | Age: 75
End: 2020-04-06
Payer: COMMERCIAL

## 2020-04-06 DIAGNOSIS — N95.1 SYMPTOMATIC MENOPAUSAL OR FEMALE CLIMACTERIC STATES: Primary | ICD-10-CM

## 2020-04-06 PROCEDURE — 99442 ZZC PHYSICIAN TELEPHONE EVALUATION 11-20 MIN: CPT | Performed by: OBSTETRICS & GYNECOLOGY

## 2020-04-06 RX ORDER — ESTRADIOL 0.04 MG/D
1 PATCH TRANSDERMAL WEEKLY
Qty: 12 PATCH | Refills: 3 | Status: SHIPPED | OUTPATIENT
Start: 2020-04-06 | End: 2021-04-19

## 2020-08-21 ENCOUNTER — VIRTUAL VISIT (OUTPATIENT)
Dept: FAMILY MEDICINE | Facility: CLINIC | Age: 75
End: 2020-08-21
Payer: COMMERCIAL

## 2020-08-21 DIAGNOSIS — F41.9 ANXIETY: Primary | ICD-10-CM

## 2020-08-21 PROCEDURE — 99213 OFFICE O/P EST LOW 20 MIN: CPT | Mod: 95 | Performed by: FAMILY MEDICINE

## 2020-08-21 RX ORDER — ESCITALOPRAM OXALATE 10 MG/1
10 TABLET ORAL DAILY
Qty: 30 TABLET | Refills: 3 | Status: SHIPPED | OUTPATIENT
Start: 2020-08-21 | End: 2021-03-31

## 2020-08-21 ASSESSMENT — ANXIETY QUESTIONNAIRES
7. FEELING AFRAID AS IF SOMETHING AWFUL MIGHT HAPPEN: NEARLY EVERY DAY
GAD7 TOTAL SCORE: 14
1. FEELING NERVOUS, ANXIOUS, OR ON EDGE: SEVERAL DAYS
IF YOU CHECKED OFF ANY PROBLEMS ON THIS QUESTIONNAIRE, HOW DIFFICULT HAVE THESE PROBLEMS MADE IT FOR YOU TO DO YOUR WORK, TAKE CARE OF THINGS AT HOME, OR GET ALONG WITH OTHER PEOPLE: VERY DIFFICULT
5. BEING SO RESTLESS THAT IT IS HARD TO SIT STILL: NOT AT ALL
6. BECOMING EASILY ANNOYED OR IRRITABLE: SEVERAL DAYS
2. NOT BEING ABLE TO STOP OR CONTROL WORRYING: NEARLY EVERY DAY
3. WORRYING TOO MUCH ABOUT DIFFERENT THINGS: NEARLY EVERY DAY

## 2020-08-21 ASSESSMENT — PATIENT HEALTH QUESTIONNAIRE - PHQ9
5. POOR APPETITE OR OVEREATING: NEARLY EVERY DAY
SUM OF ALL RESPONSES TO PHQ QUESTIONS 1-9: 7

## 2020-08-21 NOTE — PROGRESS NOTES
"Camelia Bangura is a 75 year old female who is being evaluated via a billable telephone visit.      The patient has been notified of following:     \"This telephone visit will be conducted via a call between you and your physician/provider. We have found that certain health care needs can be provided without the need for a physical exam.  This service lets us provide the care you need with a short phone conversation.  If a prescription is necessary we can send it directly to your pharmacy.  If lab work is needed we can place an order for that and you can then stop by our lab to have the test done at a later time.    Telephone visits are billed at different rates depending on your insurance coverage. During this emergency period, for some insurers they may be billed the same as an in-person visit.  Please reach out to your insurance provider with any questions.    If during the course of the call the physician/provider feels a telephone visit is not appropriate, you will not be charged for this service.\"    Patient has given verbal consent for Telephone visit?  Yes    What phone number would you like to be contacted at? [phone]    How would you like to obtain your AVS? Mail a copy    Subjective   75 yr old female here for anxiety symptoms. She has been dealing with some stuff in her family. She reports that she is having some crisis in her family and she thinks she may have had a panic attack the other day. She reports chest heaviness and palpations lasting a couple of minutes. She also reports that she has not been sleeping well. She denies any suicidal ideation.       Camelia Bangura is a 75 year old female who presents via phone visit today for the following health issues:    HPI    Abnormal Mood Symptoms  Onset/Duration: Anxiety  Description: a week or so  Depression (if yes, do PHQ-9): YES- Family Crises  Anxiety (if yes, do JEANNINE-7): YES  Accompanying Signs & Symptoms:  Still participating in activities " that you used to enjoy: YES  Fatigue: no  Irritability: no  Difficulty concentrating: YES  Changes in appetite: YES- lost of appetitie  Problems with sleep: YES- poor  Heart racing/beating fast: YES  Abnormally elevated, expansive, or irritable mood: YES  Persistently increased activity or energy: no  Thoughts of hurting yourself or others: no  History:  Recent stress or major life event: YES- family  Prior depression or anxiety: None  Family history of depression or anxiety: YES-   (anxiety)  Alcohol/drug use: no  Difficulty sleeping: YES  Precipitating or alleviating factors: None  Therapies tried and outcome: none  PHQ 8/21/2020   PHQ-9 Total Score 7   Q9: Thoughts of better off dead/self-harm past 2 weeks Not at all     JEANNINE-7 SCORE 8/21/2020   Total Score 14             Review of Systems   Constitutional, HEENT, cardiovascular, pulmonary, gi and gu systems are negative, except as otherwise noted.       Objective          Vitals:  No vitals were obtained today due to virtual visit.    healthy, alert and no distress  PSYCH: Alert and oriented times 3; coherent speech, normal   rate and volume, able to articulate logical thoughts, able   to abstract reason, no tangential thoughts, no hallucinations   or delusions  Her affect is normal  RESP: No cough, no audible wheezing, able to talk in full sentences  Remainder of exam unable to be completed due to telephone visits    No results found for this or any previous visit (from the past 24 hour(s)).        Assessment/Plan:    Assessment & Plan     Anxiety  Medication faxed. Recommend rechecking in 3 weeks. Also recommend counseling.  - escitalopram (LEXAPRO) 10 MG tablet  Dispense: 30 tablet; Refill: 3               FUTURE APPOINTMENTS:       - Follow-up visit in 3 weeks.    Return in about 3 weeks (around 9/11/2020) for Phone Visit.    Tracey Pitts MD  [facility]    Phone call duration:  10 minutes

## 2020-08-22 ASSESSMENT — ANXIETY QUESTIONNAIRES: GAD7 TOTAL SCORE: 14

## 2020-09-03 ENCOUNTER — ALLIED HEALTH/NURSE VISIT (OUTPATIENT)
Dept: FAMILY MEDICINE | Facility: CLINIC | Age: 75
End: 2020-09-03
Payer: COMMERCIAL

## 2020-09-03 DIAGNOSIS — Z23 NEED FOR PROPHYLACTIC VACCINATION AND INOCULATION AGAINST INFLUENZA: Primary | ICD-10-CM

## 2020-09-03 PROCEDURE — G0008 ADMIN INFLUENZA VIRUS VAC: HCPCS

## 2020-09-03 PROCEDURE — 99207 ZZC NO CHARGE NURSE ONLY: CPT

## 2020-09-03 PROCEDURE — 90662 IIV NO PRSV INCREASED AG IM: CPT

## 2020-10-07 ENCOUNTER — ANCILLARY PROCEDURE (OUTPATIENT)
Dept: MAMMOGRAPHY | Facility: CLINIC | Age: 75
End: 2020-10-07
Attending: FAMILY MEDICINE
Payer: COMMERCIAL

## 2020-10-07 DIAGNOSIS — Z12.31 VISIT FOR SCREENING MAMMOGRAM: ICD-10-CM

## 2020-10-07 PROCEDURE — 77067 SCR MAMMO BI INCL CAD: CPT | Performed by: RADIOLOGY

## 2020-10-07 PROCEDURE — 77063 BREAST TOMOSYNTHESIS BI: CPT | Performed by: RADIOLOGY

## 2021-01-07 ENCOUNTER — VIRTUAL VISIT (OUTPATIENT)
Dept: FAMILY MEDICINE | Facility: CLINIC | Age: 76
End: 2021-01-07
Payer: COMMERCIAL

## 2021-01-07 DIAGNOSIS — M79.645 THUMB PAIN, LEFT: ICD-10-CM

## 2021-01-07 DIAGNOSIS — M25.561 CHRONIC PAIN OF RIGHT KNEE: Primary | ICD-10-CM

## 2021-01-07 DIAGNOSIS — F32.5 MAJOR DEPRESSIVE DISORDER IN REMISSION, UNSPECIFIED WHETHER RECURRENT (H): ICD-10-CM

## 2021-01-07 DIAGNOSIS — G89.29 CHRONIC PAIN OF RIGHT KNEE: Primary | ICD-10-CM

## 2021-01-07 PROCEDURE — 99441 PR PHYSICIAN TELEPHONE EVALUATION 5-10 MIN: CPT | Mod: 95 | Performed by: FAMILY MEDICINE

## 2021-01-07 NOTE — PROGRESS NOTES
Camelia is a 75 year old who is being evaluated via a billable telephone visit.      Patient is a 75 yr old female here by telephone. She has three main complaints today. She has been experiencing severe knee pain and she says a few years ago she was given cortisone shots which helped.     She will like a referral to get that . She also mentioned that she has been experiencing some thumb pain in her left hand.     She is having a hard time twisting jars or opening jars. She reports no injuries and has had no tingling or numbness in her hands.   Lastly she will like to gradually wean off her antidepressants as she feels like she is in a better place    What phone number would you like to be contacted at? [phone]  How would you like to obtain your AVS? Mail a copy  Assessment & Plan     Chronic pain of right knee  - Orthopedic & Spine  Referral; Future    Thumb pain, left  - Orthopedic & Spine  Referral; Future        Diagnosis Comments   1. Chronic pain of right knee  Orthopedic & Spine  Referral    2. Thumb pain, left  Orthopedic & Spine  Referral    3. Major depressive disorder in remission, unspecified whether recurrent (H)       Patient referred to orthopedics  Patient is asked to taper slowly off the lexapro        FUTURE APPOINTMENTS:       - Follow-up visit in one month    Return in about 4 weeks (around 2/4/2021) for Follow up.    Tracey Pitts MD  [facility]    Loni Denise is a 75 year old who presents to clinic today for the following health issues:    HPI     Chief Complaint   Patient presents with     Musculoskeletal Problem     right knee, thinking it is arthritis. About 3 years ago she got a cortisone shot in her knee, feel like with her walking everyday it has gotten worse. Would like to discuss cortisone, possible referral to orthopedic.     Hand Pain     left thumb has been bugging her for about 3 weeks. Feels as  "if it is double jointed/ locks in/ clicks. 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 Would like to come off lexapro, feels as if she is getting better.        Musculoskeletal problem/pain  Onset/Duration: been on going for years but gotten worse over the past couple weeks  Description  Location: knee - right  Joint Swelling: no  Redness: no  Pain: YES- when walking or bending knee it hurts   Warmth: no  Intensity:  moderate  Progression of Symptoms:  same  Accompanying signs and symptoms:   Fevers: no  Numbness/tingling/weakness: no  History  Trauma to the area: YES- a couple weeks ago it irritated her a lot. Been bothering her since walking in the snow .   Recent illness:  no  Previous similar problem: YES- has gotten cortisone shot in knee about 3 years ago with Dr. High  Previous evaluation:  YES- was told she has \"trick knee\" as a kid.   Precipitating or alleviating factors:  Aggravating factors include: walking, climbing stairs, overuse, cold or damp weather and bending   Therapies tried and outcome: rest/inactivity and Ibuprofen    Musculoskeletal problem/pain  Onset/Duration: For a couple weeks   Description  Location: hand - left (thumb)  Joint Swelling: YES- little   Redness: no  Pain: Yes, every once in awhile when she flexes it/ pressure   Warmth: no  Intensity:  Mild- moderate   Progression of Symptoms:  same  Accompanying signs and symptoms:   Fevers: no  Numbness/tingling/weakness: no  History  Trauma to the area: no, but does do glass work or hurts when opening jar  Recent illness:  no  Previous similar problem: no  Previous evaluation:  no  Precipitating or alleviating factors:  Aggravating factors include: pressure/ movement   Therapies tried and outcome: Ibuprofen      Review of Systems   CONSTITUTIONAL: NEGATIVE for fever, " chills, change in weight  ENT/MOUTH: NEGATIVE for ear, mouth and throat problems  RESP: NEGATIVE for significant cough or SOB  CV: NEGATIVE for chest pain, palpitations or peripheral edema  MUSCULOSKELETAL: POSITIVE  for joint pain knee pain   ENDOCRINE: NEGATIVE for temperature intolerance, skin/hair changes  PSYCHIATRIC: NEGATIVE for changes in mood or affect      Objective           Vitals:  No vitals were obtained today due to virtual visit.    Physical Exam   healthy, alert and no distress  PSYCH: Alert and oriented times 3; coherent speech, normal   rate and volume, able to articulate logical thoughts, able   to abstract reason, no tangential thoughts, no hallucinations   or delusions  Her affect is normal  RESP: No cough, no audible wheezing, able to talk in full sentences  Remainder of exam unable to be completed due to telephone visits    No labs            Phone call duration: 9 minutes

## 2021-01-14 ENCOUNTER — ANCILLARY PROCEDURE (OUTPATIENT)
Dept: GENERAL RADIOLOGY | Facility: CLINIC | Age: 76
End: 2021-01-14
Attending: FAMILY MEDICINE
Payer: COMMERCIAL

## 2021-01-14 ENCOUNTER — OFFICE VISIT (OUTPATIENT)
Dept: ORTHOPEDICS | Facility: CLINIC | Age: 76
End: 2021-01-14
Attending: FAMILY MEDICINE
Payer: COMMERCIAL

## 2021-01-14 VITALS
WEIGHT: 151 LBS | HEIGHT: 60 IN | SYSTOLIC BLOOD PRESSURE: 112 MMHG | BODY MASS INDEX: 29.64 KG/M2 | DIASTOLIC BLOOD PRESSURE: 77 MMHG

## 2021-01-14 DIAGNOSIS — M17.0 PRIMARY OSTEOARTHRITIS OF BOTH KNEES: Primary | ICD-10-CM

## 2021-01-14 DIAGNOSIS — G89.29 CHRONIC PAIN OF RIGHT KNEE: ICD-10-CM

## 2021-01-14 DIAGNOSIS — M79.645 THUMB PAIN, LEFT: ICD-10-CM

## 2021-01-14 DIAGNOSIS — M65.312 TRIGGER THUMB OF LEFT HAND: ICD-10-CM

## 2021-01-14 DIAGNOSIS — M25.561 CHRONIC PAIN OF RIGHT KNEE: ICD-10-CM

## 2021-01-14 PROCEDURE — 99204 OFFICE O/P NEW MOD 45 MIN: CPT | Mod: 25 | Performed by: FAMILY MEDICINE

## 2021-01-14 PROCEDURE — 73562 X-RAY EXAM OF KNEE 3: CPT | Performed by: RADIOLOGY

## 2021-01-14 PROCEDURE — 20611 DRAIN/INJ JOINT/BURSA W/US: CPT | Mod: RT | Performed by: FAMILY MEDICINE

## 2021-01-14 RX ORDER — TRIAMCINOLONE ACETONIDE 40 MG/ML
40 INJECTION, SUSPENSION INTRA-ARTICULAR; INTRAMUSCULAR
Status: DISCONTINUED | OUTPATIENT
Start: 2021-01-14 | End: 2021-09-21 | Stop reason: ALTCHOICE

## 2021-01-14 RX ORDER — ROPIVACAINE HYDROCHLORIDE 5 MG/ML
3 INJECTION, SOLUTION EPIDURAL; INFILTRATION; PERINEURAL
Status: DISCONTINUED | OUTPATIENT
Start: 2021-01-14 | End: 2021-09-21 | Stop reason: ALTCHOICE

## 2021-01-14 RX ADMIN — ROPIVACAINE HYDROCHLORIDE 3 ML: 5 INJECTION, SOLUTION EPIDURAL; INFILTRATION; PERINEURAL at 11:00

## 2021-01-14 RX ADMIN — TRIAMCINOLONE ACETONIDE 40 MG: 40 INJECTION, SUSPENSION INTRA-ARTICULAR; INTRAMUSCULAR at 11:00

## 2021-01-14 ASSESSMENT — MIFFLIN-ST. JEOR: SCORE: 1111.43

## 2021-01-14 NOTE — LETTER
2021         RE: Camelia Bangura  [address] [facility] [address]        Dear Colleague,    Thank you for referring your patient, Camelia Bangura, to the [facility] [place]. Please see a copy of my visit note below.    Camelia Bangura  :  1945  DOS: 2021  MRN: [id number]    Sports Medicine Clinic Visit    PCP: Chaya Mckenna    Camelia Bangura is a 75 year old female left hand dominant who is seen in consultation at the request of  Tracey Pitts M.D. presenting with acute right knee pain.    Injury: Gradual onset of right knee pain over the past 4 - 6 weeks after walking on slippery sidewalk.  Pain located over right deep anterior medial knee, nonradiating.  Additional Features:  Positive: grinding and weakness.  Symptoms are better with Rest.  Symptoms are worse with: deep knee bends, going from sit to stand, kneeling.  Other evaluation and/or treatments so far consists of: Ibuprofen and Rest.  Recent imaging completed: No recent imaging completed.  Prior History of related problems: history of similar right knee pain in 2017 that improved following steroid injection from her PCP - Dr Casas.    Second complaint of acute left thumb pain with possible contracture over the past 3 - 4 weeks.  Pain is worse with gripping/grasping and thumb flexion/opposition.  No prior treatment or imaging completed at this time.  No previous history of injury to left thumb.    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    Past Medical History:   Diagnosis Date     Arthritis      Contact dermatitis and other eczema, due to unspecified cause      Dysplasia of cervix, unspecified      Past Surgical History:    Procedure Laterality Date     C APPENDECTOMY       HC CORRECT BUNION,SIMPLE  05/23/90    Right foot     HC REMOVAL OF TONSILS,<15 Y/O       HYSTERECTOMY, PAP NO LONGER INDICATED  4/05     HYSTERECTOMY, VAGINAL  4/05     SURGICAL HISTORY OF -   06/19/95    Bunion deformity & fractured tibial sesamoid left foot     Family History   Problem Relation Age of Onset     Cerebrovascular Disease Mother      Arthritis Mother      Osteoporosis Mother      Melanoma No family hx of        Objective  /77   Ht 1.524 m (5')   Wt 68.5 kg (151 lb)   BMI 29.49 kg/m        General: healthy, alert and in no distress      HEENT: no scleral icterus or conjunctival erythema     Skin: no suspicious lesions or rash. No jaundice.     CV: regular rhythm by palpation, 2+ distal pulses, no pedal edema      Resp: normal respiratory effort without conversational dyspnea     Psych: normal mood and affect      Gait: nonantalgic, appropriate coordination and balance     Neuro: normal light touch sensory exam of the extremities. Motor strength as noted below     Right Knee exam    ROM:        Full active and passive ROM with flexion and extension       Painful terminal flexion    Inspection:       no visible ecchymosis       Trace effusion by palpation    Skin:       no visible deformities       well perfused       capillary refill brisk    Patellar Motion:        Normal patellar tracking noted through range of motion       Lateral tilt noted in patella       Crepitus noted in the patellofemoral joint    Tender:        medial patellar border       lateral patellar border       lateral joint line    Non Tender:         remainder of knee area    Special Tests:        neg (-) Lachman       neg (-) varus at 0 deg and 30 deg       neg (-) valgus at 0 deg and 30 deg    Left hand exam  Inspection:Thumb:  slight swelling, over MCP, flexor tendon nodule  Tender: Thumb:   MCP joint, triggering, A1 Pulley  Non-tender: remainder of hand  Range of  Motion Thumb:   Painful full extension, triggering of A1 pulley with flexion  Strength: full strength      Radiology  Recent Results (from the past 744 hour(s))   XR Knee Standing AP Bilat [place] Bilat Lat Right    Narrative    KNEE STANDING AP BILATERAL [place] BILATERAL LATERAL RIGHT 1/14/2021  10:35 AM     HISTORY: Chronic pain of right knee.       Impression    IMPRESSION:  1. Right knee 3 views: Patellofemoral osteophytosis. Mild lateral  compartment osteophytosis. Spurring at the notch. There is mild  chondrocalcinosis (CPPD). Patellofemoral osteoarthritis, including  prominent narrowing far laterally. There is mild lateral patellar  subluxation.  2. Left knee 2 views: Spurring at the notch. Mild lateral compartment  osteophytosis. Patellofemoral osteophytosis. Slight lateral patellar  subluxation. Subtle chondrocalcinosis (CPPD).         Large Joint Injection/Arthocentesis: R knee joint    Date/Time: 1/14/2021 11:00 AM  Performed by: Jorge Arias DO  Authorized by: Jorge Arias DO     Indications:  Pain and osteoarthritis  Needle Size:  22 G  Guidance: ultrasound    Approach:  Superolateral  Location:  Knee      Medications:  3 mL ropivacaine 5 MG/ML; 40 mg triamcinolone 40 MG/ML  Outcome:  Tolerated well, no immediate complications  Procedure discussed: discussed risks, benefits, and alternatives    Consent Given by:  Patient  Timeout: timeout called immediately prior to procedure    Prep: patient was prepped and draped in usual sterile fashion        Assessment:  1. Primary osteoarthritis of both knees    2. Chronic pain of right knee    3. Thumb pain, left    4. Trigger thumb of left hand        Plan:  Discussed the assessment with the patient.  Follow up: prn based on clinical progress  Known knee OA, severe in PF compartment, had good relief from CSI in the past  US guided CSI today, will assess benefit over time  Consider trial of viscosupplementation in the future if good  relief but pain starts to recur, reviewed briefly today, pre-auth can be submitted as needed  Low impact activity strategies reviewed in detail  PT available prn  Brace options reviewed  Can try Voltaren gel OTC if needed  Left thumb with early trigger finger, oval-8 splint provided, reviewed goals of conservative care  Can consider A1 pulley tendon sheath CSI in the future based on clinical progress  Reviewed wt loss, activity modification and progressive increase in activity as tolerated and guided by pain  Reviewed options for potential steroid vs viscosupplementation injections and the possibility for future orthopedic referral prn  Reviewed safe and appropriate OTC medication choices, try tylenol first  Up to 3000mg daily of tylenol is generally safe, NSAID dosing and duration limitations reviewed  Discussed nature of degenerative arthrosis of the knee.   Discussed symptom treatment with ice or heat, topical treatments, and rest if needed.   Expectations and goals of CSI reviewed  Often 2-3 days for steroid effect, and can take up to two weeks for maximum effect  We discussed modified progressive pain-free activity as tolerated  Do not overuse in first two weeks if feeling better due to concern for vulnerability while steroid is working  Supportive care reviewed  All questions were answered today  Contact us with additional questions or concerns  Signs and sx of concern reviewed    Thanks very much for sending this nice lady to us, I will keep you updated with her progress      Jorge Arias DO, CAQ  Primary Care Sports Medicine  [facility]               Disclaimer: This note consists of symbols derived from keyboarding, dictation and/or voice recognition software. As a result, there may be errors in the script that have gone undetected. Please consider this when interpreting information found in this chart.      Again, thank you for allowing me to participate in the care of your patient.         Sincerely,        Jorge Arias, DO

## 2021-01-14 NOTE — PROGRESS NOTES
Camelia Bangura  :  1945  DOS: 2021  MRN: [id number]    Sports Medicine Clinic Visit    PCP: Chaya Mckenna    Camelia Bangura is a 75 year old female left hand dominant who is seen in consultation at the request of  Tracey Pitts M.D. presenting with acute right knee pain.    Injury: Gradual onset of right knee pain over the past 4 - 6 weeks after walking on slippery sidewalk.  Pain located over right deep anterior medial knee, nonradiating.  Additional Features:  Positive: grinding and weakness.  Symptoms are better with Rest.  Symptoms are worse with: deep knee bends, going from sit to stand, kneeling.  Other evaluation and/or treatments so far consists of: Ibuprofen and Rest.  Recent imaging completed: No recent imaging completed.  Prior History of related problems: history of similar right knee pain in  that improved following steroid injection from her PCP - Dr Casas.    Second complaint of acute left thumb pain with possible contracture over the past 3 - 4 weeks.  Pain is worse with gripping/grasping and thumb flexion/opposition.  No prior treatment or imaging completed at this time.  No previous history of injury to left thumb.    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    Past Medical History:   Diagnosis Date     Arthritis      Contact dermatitis and other eczema, due to unspecified cause      Dysplasia of cervix, unspecified      Past Surgical History:   Procedure Laterality Date     C APPENDECTOMY       HC CORRECT BUNION,SIMPLE  90    Right foot     HC REMOVAL OF TONSILS,<15 Y/O       HYSTERECTOMY, PAP NO LONGER INDICATED       HYSTERECTOMY, VAGINAL       SURGICAL HISTORY OF -   95    Bunion deformity &  fractured tibial sesamoid left foot     Family History   Problem Relation Age of Onset     Cerebrovascular Disease Mother      Arthritis Mother      Osteoporosis Mother      Melanoma No family hx of        Objective  /77   Ht 1.524 m (5')   Wt 68.5 kg (151 lb)   BMI 29.49 kg/m        General: healthy, alert and in no distress      HEENT: no scleral icterus or conjunctival erythema     Skin: no suspicious lesions or rash. No jaundice.     CV: regular rhythm by palpation, 2+ distal pulses, no pedal edema      Resp: normal respiratory effort without conversational dyspnea     Psych: normal mood and affect      Gait: nonantalgic, appropriate coordination and balance     Neuro: normal light touch sensory exam of the extremities. Motor strength as noted below     Right Knee exam    ROM:        Full active and passive ROM with flexion and extension       Painful terminal flexion    Inspection:       no visible ecchymosis       Trace effusion by palpation    Skin:       no visible deformities       well perfused       capillary refill brisk    Patellar Motion:        Normal patellar tracking noted through range of motion       Lateral tilt noted in patella       Crepitus noted in the patellofemoral joint    Tender:        medial patellar border       lateral patellar border       lateral joint line    Non Tender:         remainder of knee area    Special Tests:        neg (-) Lachman       neg (-) varus at 0 deg and 30 deg       neg (-) valgus at 0 deg and 30 deg    Left hand exam  Inspection:Thumb:  slight swelling, over MCP, flexor tendon nodule  Tender: Thumb:   MCP joint, triggering, A1 Pulley  Non-tender: remainder of hand  Range of Motion Thumb:   Painful full extension, triggering of A1 pulley with flexion  Strength: full strength      Radiology  Recent Results (from the past 744 hour(s))   XR Knee Standing AP Bilat [place] Bilat Lat Right    Narrative    KNEE STANDING AP BILATERAL [place] BILATERAL LATERAL  RIGHT 1/14/2021  10:35 AM     HISTORY: Chronic pain of right knee.       Impression    IMPRESSION:  1. Right knee 3 views: Patellofemoral osteophytosis. Mild lateral  compartment osteophytosis. Spurring at the notch. There is mild  chondrocalcinosis (CPPD). Patellofemoral osteoarthritis, including  prominent narrowing far laterally. There is mild lateral patellar  subluxation.  2. Left knee 2 views: Spurring at the notch. Mild lateral compartment  osteophytosis. Patellofemoral osteophytosis. Slight lateral patellar  subluxation. Subtle chondrocalcinosis (CPPD).         Large Joint Injection/Arthocentesis: R knee joint    Date/Time: 1/14/2021 11:00 AM  Performed by: Jorge Arias DO  Authorized by: Jorge Arias DO     Indications:  Pain and osteoarthritis  Needle Size:  22 G  Guidance: ultrasound    Approach:  Superolateral  Location:  Knee      Medications:  3 mL ropivacaine 5 MG/ML; 40 mg triamcinolone 40 MG/ML  Outcome:  Tolerated well, no immediate complications  Procedure discussed: discussed risks, benefits, and alternatives    Consent Given by:  Patient  Timeout: timeout called immediately prior to procedure    Prep: patient was prepped and draped in usual sterile fashion        Assessment:  1. Primary osteoarthritis of both knees    2. Chronic pain of right knee    3. Thumb pain, left    4. Trigger thumb of left hand        Plan:  Discussed the assessment with the patient.  Follow up: prn based on clinical progress  Known knee OA, severe in PF compartment, had good relief from CSI in the past  US guided CSI today, will assess benefit over time  Consider trial of viscosupplementation in the future if good relief but pain starts to recur, reviewed briefly today, pre-auth can be submitted as needed  Low impact activity strategies reviewed in detail  PT available prn  Brace options reviewed  Can try Voltaren gel OTC if needed  Left thumb with early trigger finger, oval-8 splint provided,  reviewed goals of conservative care  Can consider A1 pulley tendon sheath CSI in the future based on clinical progress  Reviewed wt loss, activity modification and progressive increase in activity as tolerated and guided by pain  Reviewed options for potential steroid vs viscosupplementation injections and the possibility for future orthopedic referral prn  Reviewed safe and appropriate OTC medication choices, try tylenol first  Up to 3000mg daily of tylenol is generally safe, NSAID dosing and duration limitations reviewed  Discussed nature of degenerative arthrosis of the knee.   Discussed symptom treatment with ice or heat, topical treatments, and rest if needed.   Expectations and goals of CSI reviewed  Often 2-3 days for steroid effect, and can take up to two weeks for maximum effect  We discussed modified progressive pain-free activity as tolerated  Do not overuse in first two weeks if feeling better due to concern for vulnerability while steroid is working  Supportive care reviewed  All questions were answered today  Contact us with additional questions or concerns  Signs and sx of concern reviewed    Thanks very much for sending this nice lady to us, I will keep you updated with her progress      Jorge Arias DO, CAQ  Primary Care Sports Medicine  [facility]               Disclaimer: This note consists of symbols derived from keyboarding, dictation and/or voice recognition software. As a result, there may be errors in the script that have gone undetected. Please consider this when interpreting information found in this chart.

## 2021-01-26 ENCOUNTER — IMMUNIZATION (OUTPATIENT)
Dept: PEDIATRICS | Facility: CLINIC | Age: 76
End: 2021-01-26
Payer: COMMERCIAL

## 2021-01-26 PROCEDURE — 0001A PR COVID VAC PFIZER DIL RECON 30 MCG/0.3 ML IM: CPT

## 2021-01-26 PROCEDURE — 91300 PR COVID VAC PFIZER DIL RECON 30 MCG/0.3 ML IM: CPT

## 2021-01-28 DIAGNOSIS — E78.5 HYPERLIPIDEMIA LDL GOAL <130: ICD-10-CM

## 2021-01-28 NOTE — TELEPHONE ENCOUNTER
"Requested Prescriptions   Pending Prescriptions Disp Refills     simvastatin (ZOCOR) 40 MG tablet [Pharmacy Med Name: simvastatin 40 mg tablet] 90 tablet 3     Sig: TAKE ONE TABLET AT BEDTIME FOR CHOLESTEROL       Statins Protocol Passed - 1/28/2021  8:00 AM        Passed - LDL on file in past 12 months     Recent Labs   Lab Test 02/04/20  0920   LDL 67             Passed - No abnormal creatine kinase in past 12 months     No lab results found.             Passed - Recent (12 mo) or future (30 days) visit within the authorizing provider's specialty     Patient has had an office visit with the authorizing provider or a provider within the authorizing providers department within the previous 12 mos or has a future within next 30 days. See \"Patient Info\" tab in inbasket, or \"Choose Columns\" in Meds & Orders section of the refill encounter.              Passed - Medication is active on med list        Passed - Patient is age 18 or older        Passed - No active pregnancy on record        Passed - No positive pregnancy test in past 12 months             "

## 2021-02-01 ENCOUNTER — MYC MEDICAL ADVICE (OUTPATIENT)
Dept: FAMILY MEDICINE | Facility: CLINIC | Age: 76
End: 2021-02-01

## 2021-02-01 DIAGNOSIS — E78.5 HYPERLIPIDEMIA LDL GOAL <130: Primary | ICD-10-CM

## 2021-02-01 RX ORDER — SIMVASTATIN 40 MG
TABLET ORAL
Qty: 30 TABLET | Refills: 0 | Status: SHIPPED | OUTPATIENT
Start: 2021-02-01 | End: 2021-04-14

## 2021-02-02 NOTE — TELEPHONE ENCOUNTER
Medication is being filled for 1 time refill only due to:  Patient needs to be seen because it has been more than one year since last visit.   Due for labs too.    Pt is active in their [employer] account.    Reminder message sent to pt.  Chana Portillo RN

## 2021-02-06 ENCOUNTER — HEALTH MAINTENANCE LETTER (OUTPATIENT)
Age: 76
End: 2021-02-06

## 2021-02-09 NOTE — TELEPHONE ENCOUNTER
Routed to provider.  Please see MyChart question from pt.    Also, need lab orders including fasting lab orders.    Thank you.  Chana Portillo RN

## 2021-02-16 ENCOUNTER — IMMUNIZATION (OUTPATIENT)
Dept: PEDIATRICS | Facility: CLINIC | Age: 76
End: 2021-02-16
Attending: INTERNAL MEDICINE
Payer: COMMERCIAL

## 2021-02-16 PROCEDURE — 91300 PR COVID VAC PFIZER DIL RECON 30 MCG/0.3 ML IM: CPT

## 2021-02-16 PROCEDURE — 0002A PR COVID VAC PFIZER DIL RECON 30 MCG/0.3 ML IM: CPT

## 2021-02-22 DIAGNOSIS — E78.5 HYPERLIPIDEMIA LDL GOAL <130: ICD-10-CM

## 2021-02-22 LAB
ANION GAP SERPL CALCULATED.3IONS-SCNC: 3 MMOL/L (ref 3–14)
BUN SERPL-MCNC: 12 MG/DL (ref 7–30)
CALCIUM SERPL-MCNC: 9.3 MG/DL (ref 8.5–10.1)
CHLORIDE SERPL-SCNC: 105 MMOL/L (ref 94–109)
CHOLEST SERPL-MCNC: 160 MG/DL
CO2 SERPL-SCNC: 31 MMOL/L (ref 20–32)
CREAT SERPL-MCNC: 0.74 MG/DL (ref 0.52–1.04)
GFR SERPL CREATININE-BSD FRML MDRD: 80 ML/MIN/{1.73_M2}
GLUCOSE SERPL-MCNC: 87 MG/DL (ref 70–99)
HDLC SERPL-MCNC: 55 MG/DL
LDLC SERPL CALC-MCNC: 75 MG/DL
NONHDLC SERPL-MCNC: 105 MG/DL
POTASSIUM SERPL-SCNC: 3.8 MMOL/L (ref 3.4–5.3)
SODIUM SERPL-SCNC: 139 MMOL/L (ref 133–144)
TRIGL SERPL-MCNC: 150 MG/DL

## 2021-02-22 PROCEDURE — 36415 COLL VENOUS BLD VENIPUNCTURE: CPT | Performed by: FAMILY MEDICINE

## 2021-02-22 PROCEDURE — 80048 BASIC METABOLIC PNL TOTAL CA: CPT | Performed by: FAMILY MEDICINE

## 2021-02-22 PROCEDURE — 80061 LIPID PANEL: CPT | Performed by: FAMILY MEDICINE

## 2021-02-22 NOTE — LETTER
February 24, 2021      Camelia Bangura  [address] [place] [id number]        Dear ,    We are writing to inform you of your test results.    Test results were within normal parameters except the triglycerides were slightly high.   Other labs were within normal limits.  Continue your medications as is.     Resulted Orders   Lipid panel reflex to direct LDL Fasting   Result Value Ref Range    Cholesterol 160 <200 mg/dL    Triglycerides 150 (H) <150 mg/dL      Comment:      Borderline high:  150-199 mg/dl  High:             200-499 mg/dl  Very high:       >499 mg/dl  Fasting specimen      HDL Cholesterol 55 >49 mg/dL    LDL Cholesterol Calculated 75 <100 mg/dL      Comment:      Desirable:       <100 mg/dl    Non HDL Cholesterol 105 <130 mg/dL   **Basic metabolic panel FUTURE anytime   Result Value Ref Range    Sodium 139 133 - 144 mmol/L    Potassium 3.8 3.4 - 5.3 mmol/L    Chloride 105 94 - 109 mmol/L    Carbon Dioxide 31 20 - 32 mmol/L    Anion Gap 3 3 - 14 mmol/L    Glucose 87 70 - 99 mg/dL      Comment:      Fasting specimen    Urea Nitrogen 12 7 - 30 mg/dL    Creatinine 0.74 0.52 - 1.04 mg/dL    GFR Estimate 80 >60 mL/min/[1.73_m2]      Comment:      Non  GFR Calc  Starting 12/18/2018, serum creatinine based estimated GFR (eGFR) will be   calculated using the Chronic Kidney Disease Epidemiology Collaboration   (CKD-EPI) equation.      GFR Estimate If Black >90 >60 mL/min/[1.73_m2]      Comment:       GFR Calc  Starting 12/18/2018, serum creatinine based estimated GFR (eGFR) will be   calculated using the Chronic Kidney Disease Epidemiology Collaboration   (CKD-EPI) equation.      Calcium 9.3 8.5 - 10.1 mg/dL       If you have any questions or concerns, please call the clinic at the number listed above.       Sincerely,      Tracey Pitts MD

## 2021-02-24 NOTE — RESULT ENCOUNTER NOTE
Please inform patient that test result was within normal parameters except the triglycerides were slightly high.  Other labs were within normal limits.Patient is to continue her medication as is.  Thank you.     Tracey Pitts M.D.

## 2021-03-02 ENCOUNTER — OFFICE VISIT (OUTPATIENT)
Dept: ORTHOPEDICS | Facility: CLINIC | Age: 76
End: 2021-03-02
Payer: COMMERCIAL

## 2021-03-02 VITALS
HEIGHT: 60 IN | SYSTOLIC BLOOD PRESSURE: 117 MMHG | DIASTOLIC BLOOD PRESSURE: 73 MMHG | BODY MASS INDEX: 29.25 KG/M2 | WEIGHT: 149 LBS

## 2021-03-02 DIAGNOSIS — G89.29 CHRONIC PAIN OF RIGHT KNEE: Primary | ICD-10-CM

## 2021-03-02 DIAGNOSIS — M65.312 TRIGGER THUMB OF LEFT HAND: ICD-10-CM

## 2021-03-02 DIAGNOSIS — M79.645 THUMB PAIN, LEFT: ICD-10-CM

## 2021-03-02 DIAGNOSIS — M17.0 PRIMARY OSTEOARTHRITIS OF BOTH KNEES: ICD-10-CM

## 2021-03-02 DIAGNOSIS — M25.561 CHRONIC PAIN OF RIGHT KNEE: Primary | ICD-10-CM

## 2021-03-02 PROCEDURE — 20550 NJX 1 TENDON SHEATH/LIGAMENT: CPT | Mod: LT | Performed by: FAMILY MEDICINE

## 2021-03-02 PROCEDURE — 99214 OFFICE O/P EST MOD 30 MIN: CPT | Mod: 25 | Performed by: FAMILY MEDICINE

## 2021-03-02 RX ORDER — TRIAMCINOLONE ACETONIDE 40 MG/ML
40 INJECTION, SUSPENSION INTRA-ARTICULAR; INTRAMUSCULAR
Status: DISCONTINUED | OUTPATIENT
Start: 2021-03-02 | End: 2021-09-21 | Stop reason: ALTCHOICE

## 2021-03-02 RX ORDER — LIDOCAINE HYDROCHLORIDE 10 MG/ML
0.5 INJECTION, SOLUTION INFILTRATION; PERINEURAL
Status: DISCONTINUED | OUTPATIENT
Start: 2021-03-02 | End: 2021-09-21 | Stop reason: ALTCHOICE

## 2021-03-02 RX ADMIN — LIDOCAINE HYDROCHLORIDE 0.5 ML: 10 INJECTION, SOLUTION INFILTRATION; PERINEURAL at 09:30

## 2021-03-02 RX ADMIN — TRIAMCINOLONE ACETONIDE 40 MG: 40 INJECTION, SUSPENSION INTRA-ARTICULAR; INTRAMUSCULAR at 09:30

## 2021-03-02 ASSESSMENT — MIFFLIN-ST. JEOR: SCORE: 1097.36

## 2021-03-02 NOTE — LETTER
3/2/2021         RE: Camelia Bangura  [address] [facility] [address]        Dear Colleague,    Thank you for referring your patient, Camelia Bangura, to the [facility] [place]. Please see a copy of my visit note below.    Camelia Bangura  :  1945  DOS: 21  MRN: [id number]    Sports Medicine Clinic Visit    PCP: Chaya Mckenna    Camelia Bangura is a 75 year old female left hand dominant who is seen in consultation at the request of  Tracey Pitts M.D. presenting with acute right knee pain.    Injury: Gradual onset of right knee pain over the past 4 - 6 weeks after walking on slippery sidewalk.  Pain located over right deep anterior medial knee, nonradiating.  Additional Features:  Positive: grinding and weakness.  Symptoms are better with Rest.  Symptoms are worse with: deep knee bends, going from sit to stand, kneeling.  Other evaluation and/or treatments so far consists of: Ibuprofen and Rest.  Recent imaging completed: No recent imaging completed.  Prior History of related problems: history of similar right knee pain in 2017 that improved following steroid injection from her PCP - Dr Casas.    Second complaint of acute left thumb pain with possible contracture over the past 3 - 4 weeks.  Pain is worse with gripping/grasping and thumb flexion/opposition.  No prior treatment or imaging completed at this time.  No previous history of injury to left thumb.    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  Patient notes continued triggering of thumb when not wearing splint.  No interim injury.       Patient also reports continued right knee pain.  She had modest relief with steroid injection completed 21.  Not interested in discussion of surgical options at this point.      Review of  Systems  Musculoskeletal: as above  Remainder of review of systems is negative including constitutional, CV, pulmonary, GI, Skin and Neurologic except as noted in HPI or medical history.    Past Medical History:   Diagnosis Date     Arthritis      Contact dermatitis and other eczema, due to unspecified cause      Dysplasia of cervix, unspecified 2005     Past Surgical History:   Procedure Laterality Date     C APPENDECTOMY       HC CORRECT BUNION,SIMPLE  05/23/90    Right foot     HC REMOVAL OF TONSILS,<13 Y/O       HYSTERECTOMY, PAP NO LONGER INDICATED  4/05     HYSTERECTOMY, VAGINAL  4/05     SURGICAL HISTORY OF -   06/19/95    Bunion deformity & fractured tibial sesamoid left foot     Family History   Problem Relation Age of Onset     Cerebrovascular Disease Mother      Arthritis Mother      Osteoporosis Mother      Melanoma No family hx of        Objective  /73   Ht 1.524 m (5')   Wt 67.6 kg (149 lb)   BMI 29.10 kg/m        General: healthy, alert and in no distress      HEENT: no scleral icterus or conjunctival erythema     Skin: no suspicious lesions or rash. No jaundice.     CV: regular rhythm by palpation, 2+ distal pulses, no pedal edema      Resp: normal respiratory effort without conversational dyspnea     Psych: normal mood and affect      Gait: nonantalgic, appropriate coordination and balance     Neuro: normal light touch sensory exam of the extremities. Motor strength as noted below     Right Knee exam    ROM:        Full active and passive ROM with flexion and extension       Painful terminal flexion, mild    Inspection:       no visible ecchymosis       No effusion by palpation    Skin:       no visible deformities       well perfused       capillary refill brisk    Patellar Motion:        Normal patellar tracking noted through range of motion       Lateral tilt noted in patella       Crepitus noted in the patellofemoral joint    Tender:        medial patellar border improved       lateral  patellar border improved       lateral joint line mild    Non Tender:         remainder of knee area    Special Tests:        neg (-) Lachman       neg (-) varus at 0 deg and 30 deg       neg (-) valgus at 0 deg and 30 deg    Left hand exam  Inspection:Thumb:  slight swelling, over MCP, flexor tendon nodule  Tender: Thumb:   MCP joint, triggering, A1 Pulley  Non-tender: remainder of hand  Range of Motion Thumb:   Painful full extension, triggering of A1 pulley with flexion  Strength: full strength      Radiology  Recent Results (from the past 744 hour(s))   XR Knee Standing AP Bilat [place] Bilat Lat Right    Narrative    KNEE STANDING AP BILATERAL [place] BILATERAL LATERAL RIGHT 1/14/2021  10:35 AM     HISTORY: Chronic pain of right knee.       Impression    IMPRESSION:  1. Right knee 3 views: Patellofemoral osteophytosis. Mild lateral  compartment osteophytosis. Spurring at the notch. There is mild  chondrocalcinosis (CPPD). Patellofemoral osteoarthritis, including  prominent narrowing far laterally. There is mild lateral patellar  subluxation.  2. Left knee 2 views: Spurring at the notch. Mild lateral compartment  osteophytosis. Patellofemoral osteophytosis. Slight lateral patellar  subluxation. Subtle chondrocalcinosis (CPPD).         Hand / Upper Extremity Injection/Arthrocentesis: L thumb A1    Date/Time: 3/2/2021 9:30 AM  Performed by: Jorge Arias DO  Authorized by: Jorge Arias DO     Indications:  Pain and therapeutic  Needle Size:  25 G  Guidance: landmark    Approach:  Volar  Condition: trigger finger    Location:  Thumb    Site:  L thumb A1  Medications:  0.5 mL lidocaine 1 %; 40 mg triamcinolone 40 MG/ML  Outcome:  Tolerated well, no immediate complications  Procedure discussed: discussed risks, benefits, and alternatives    Consent Given by:  Patient  Timeout: timeout called immediately prior to procedure    Prep: patient was prepped and draped in usual sterile fashion         Assessment:  1. Chronic pain of right knee    2. Primary osteoarthritis of both knees        Plan:  Discussed the assessment with the patient.  Follow up: prn based on clinical progress  Known knee OA, severe in PF compartment, had good relief from CSI in the past  US guided CSI modestly helpful thus far, not interested in TKA vs hemiarthroplasty discussion at this point  Consider trial of viscosupplementation, reviewed again today, pre-auth submitted   Low impact activity strategies reviewed again in detail  PT available prn  Brace options reviewed  Can try Voltaren gel OTC if needed, reviewed again today  Left thumb with trigger finger, oval-8 splint provided previously, reviewed goals of conservative care  1st A1 pulley tendon sheath CSI today  Reviewed wt loss, activity modification and progressive increase in activity as tolerated and guided by pain  Expectations and goals of CSI reviewed  Often 2-3 days for steroid effect, and can take up to two weeks for maximum effect  We discussed modified progressive pain-free activity as tolerated  Do not overuse in first two weeks if feeling better due to concern for vulnerability while steroid is working  Supportive care reviewed  All questions were answered today  Contact us with additional questions or concerns  Signs and sx of concern reviewed      Jorge Arias DO, CANIKOLAS  Primary Care Sports Medicine  [facility]       Time spent in chart review, one-on-one evaluation, discussion with patient regarding nature of problem, course, prior treatments, and therapeutic options, share-decision making, procedures and referrals as appropriate/documented, and charting related to the visit: 31 minutes        Disclaimer: This note consists of symbols derived from keyboarding, dictation and/or voice recognition software. As a result, there may be errors in the script that have gone undetected. Please consider this when interpreting information found in  this chart.      Again, thank you for allowing me to participate in the care of your patient.        Sincerely,        Jorge Arias, DO

## 2021-03-02 NOTE — PROGRESS NOTES
Camelia Bangura  :  1945  DOS: 21  MRN: [id number]    Sports Medicine Clinic Visit    PCP: Chaya Mckenna    Camelia Bangura is a 75 year old female left hand dominant who is seen in consultation at the request of  Tracey Pitts M.D. presenting with acute right knee pain.    Injury: Gradual onset of right knee pain over the past 4 - 6 weeks after walking on slippery sidewalk.  Pain located over right deep anterior medial knee, nonradiating.  Additional Features:  Positive: grinding and weakness.  Symptoms are better with Rest.  Symptoms are worse with: deep knee bends, going from sit to stand, kneeling.  Other evaluation and/or treatments so far consists of: Ibuprofen and Rest.  Recent imaging completed: No recent imaging completed.  Prior History of related problems: history of similar right knee pain in 2017 that improved following steroid injection from her PCP - Dr Casas.    Second complaint of acute left thumb pain with possible contracture over the past 3 - 4 weeks.  Pain is worse with gripping/grasping and thumb flexion/opposition.  No prior treatment or imaging completed at this time.  No previous history of injury to left thumb.    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  Patient notes continued triggering of thumb when not wearing splint.  No interim injury.       Patient also reports continued right knee pain.  She had modest relief with steroid injection completed 21.  Not interested in discussion of surgical options at this point.      Review of Systems  Musculoskeletal: as above  Remainder of review of systems is negative including constitutional, CV, pulmonary, GI, Skin and Neurologic except as noted in HPI or medical history.    Past Medical History:   Diagnosis Date     Arthritis      Contact dermatitis and other  eczema, due to unspecified cause      Dysplasia of cervix, unspecified 2005     Past Surgical History:   Procedure Laterality Date     C APPENDECTOMY       HC CORRECT BUNION,SIMPLE  05/23/90    Right foot     HC REMOVAL OF TONSILS,<11 Y/O       HYSTERECTOMY, PAP NO LONGER INDICATED  4/05     HYSTERECTOMY, VAGINAL  4/05     SURGICAL HISTORY OF -   06/19/95    Bunion deformity & fractured tibial sesamoid left foot     Family History   Problem Relation Age of Onset     Cerebrovascular Disease Mother      Arthritis Mother      Osteoporosis Mother      Melanoma No family hx of        Objective  /73   Ht 1.524 m (5')   Wt 67.6 kg (149 lb)   BMI 29.10 kg/m        General: healthy, alert and in no distress      HEENT: no scleral icterus or conjunctival erythema     Skin: no suspicious lesions or rash. No jaundice.     CV: regular rhythm by palpation, 2+ distal pulses, no pedal edema      Resp: normal respiratory effort without conversational dyspnea     Psych: normal mood and affect      Gait: nonantalgic, appropriate coordination and balance     Neuro: normal light touch sensory exam of the extremities. Motor strength as noted below     Right Knee exam    ROM:        Full active and passive ROM with flexion and extension       Painful terminal flexion, mild    Inspection:       no visible ecchymosis       No effusion by palpation    Skin:       no visible deformities       well perfused       capillary refill brisk    Patellar Motion:        Normal patellar tracking noted through range of motion       Lateral tilt noted in patella       Crepitus noted in the patellofemoral joint    Tender:        medial patellar border improved       lateral patellar border improved       lateral joint line mild    Non Tender:         remainder of knee area    Special Tests:        neg (-) Lachman       neg (-) varus at 0 deg and 30 deg       neg (-) valgus at 0 deg and 30 deg    Left hand exam  Inspection:Thumb:  slight swelling,  over MCP, flexor tendon nodule  Tender: Thumb:   MCP joint, triggering, A1 Pulley  Non-tender: remainder of hand  Range of Motion Thumb:   Painful full extension, triggering of A1 pulley with flexion  Strength: full strength      Radiology  Recent Results (from the past 744 hour(s))   XR Knee Standing AP Bilat [place] Bilat Lat Right    Narrative    KNEE STANDING AP BILATERAL [place] BILATERAL LATERAL RIGHT 1/14/2021  10:35 AM     HISTORY: Chronic pain of right knee.       Impression    IMPRESSION:  1. Right knee 3 views: Patellofemoral osteophytosis. Mild lateral  compartment osteophytosis. Spurring at the notch. There is mild  chondrocalcinosis (CPPD). Patellofemoral osteoarthritis, including  prominent narrowing far laterally. There is mild lateral patellar  subluxation.  2. Left knee 2 views: Spurring at the notch. Mild lateral compartment  osteophytosis. Patellofemoral osteophytosis. Slight lateral patellar  subluxation. Subtle chondrocalcinosis (CPPD).         Hand / Upper Extremity Injection/Arthrocentesis: L thumb A1    Date/Time: 3/2/2021 9:30 AM  Performed by: Jorge Arias DO  Authorized by: Jorge Arias DO     Indications:  Pain and therapeutic  Needle Size:  25 G  Guidance: landmark    Approach:  Volar  Condition: trigger finger    Location:  Thumb    Site:  L thumb A1  Medications:  0.5 mL lidocaine 1 %; 40 mg triamcinolone 40 MG/ML  Outcome:  Tolerated well, no immediate complications  Procedure discussed: discussed risks, benefits, and alternatives    Consent Given by:  Patient  Timeout: timeout called immediately prior to procedure    Prep: patient was prepped and draped in usual sterile fashion        Assessment:  1. Chronic pain of right knee    2. Primary osteoarthritis of both knees        Plan:  Discussed the assessment with the patient.  Follow up: prn based on clinical progress  Known knee OA, severe in PF compartment, had good relief from CSI in the past  US guided CSI  modestly helpful thus far, not interested in TKA vs hemiarthroplasty discussion at this point  Consider trial of viscosupplementation, reviewed again today, pre-auth submitted   Low impact activity strategies reviewed again in detail  PT available prn  Brace options reviewed  Can try Voltaren gel OTC if needed, reviewed again today  Left thumb with trigger finger, oval-8 splint provided previously, reviewed goals of conservative care  1st A1 pulley tendon sheath CSI today  Reviewed wt loss, activity modification and progressive increase in activity as tolerated and guided by pain  Expectations and goals of CSI reviewed  Often 2-3 days for steroid effect, and can take up to two weeks for maximum effect  We discussed modified progressive pain-free activity as tolerated  Do not overuse in first two weeks if feeling better due to concern for vulnerability while steroid is working  Supportive care reviewed  All questions were answered today  Contact us with additional questions or concerns  Signs and sx of concern reviewed      Jorge Arias DO, CAQ  Primary Care Sports Medicine  [facility]       Time spent in chart review, one-on-one evaluation, discussion with patient regarding nature of problem, course, prior treatments, and therapeutic options, share-decision making, procedures and referrals as appropriate/documented, and charting related to the visit: 31 minutes        Disclaimer: This note consists of symbols derived from keyboarding, dictation and/or voice recognition software. As a result, there may be errors in the script that have gone undetected. Please consider this when interpreting information found in this chart.

## 2021-03-27 DIAGNOSIS — F41.9 ANXIETY: ICD-10-CM

## 2021-03-29 NOTE — TELEPHONE ENCOUNTER
"Requested Prescriptions   Pending Prescriptions Disp Refills     escitalopram (LEXAPRO) 10 MG tablet [Pharmacy Med Name: escitalopram 10 mg tablet] 90 tablet 0     Sig: Take 1 tablet (10 mg) by mouth daily       SSRIs Protocol Passed - 3/27/2021  2:53 PM        Passed - Recent (12 mo) or future (30 days) visit within the authorizing provider's specialty     Patient has had an office visit with the authorizing provider or a provider within the authorizing providers department within the previous 12 mos or has a future within next 30 days. See \"Patient Info\" tab in inbasket, or \"Choose Columns\" in Meds & Orders section of the refill encounter.              Passed - Medication is active on med list        Passed - Patient is age 18 or older        Passed - No active pregnancy on record        Passed - No positive pregnancy test in last 12 months             "

## 2021-03-30 NOTE — TELEPHONE ENCOUNTER
Routing refill request to provider for review/approval because:  Drug interaction warning    Harriett King RN

## 2021-03-31 RX ORDER — ESCITALOPRAM OXALATE 10 MG/1
10 TABLET ORAL DAILY
Qty: 90 TABLET | Refills: 0 | Status: ON HOLD | OUTPATIENT
Start: 2021-03-31 | End: 2021-06-21

## 2021-04-14 DIAGNOSIS — E78.5 HYPERLIPIDEMIA LDL GOAL <130: ICD-10-CM

## 2021-04-14 RX ORDER — SIMVASTATIN 40 MG
TABLET ORAL
Qty: 90 TABLET | Refills: 0 | Status: SHIPPED | OUTPATIENT
Start: 2021-04-14 | End: 2021-07-29

## 2021-04-19 DIAGNOSIS — N95.1 SYMPTOMATIC MENOPAUSAL OR FEMALE CLIMACTERIC STATES: ICD-10-CM

## 2021-04-19 RX ORDER — ESTRADIOL 0.04 MG/D
1 PATCH TRANSDERMAL WEEKLY
Qty: 12 PATCH | Refills: 3 | Status: SHIPPED | OUTPATIENT
Start: 2021-04-19 | End: 2022-07-13

## 2021-04-19 NOTE — TELEPHONE ENCOUNTER
"Requested Prescriptions   Pending Prescriptions Disp Refills     estradiol (CLIMARA) 0.0375 MG/24HR weekly patch 12 patch 3     Sig: Place 1 patch onto the skin once a week       Hormone Replacement Therapy Failed - 4/19/2021 11:56 AM        Failed - Recent (12 mo) or future (30 days) visit within the authorizing provider's specialty     Patient has had an office visit with the authorizing provider or a provider within the authorizing providers department within the previous 12 mos or has a future within next 30 days. See \"Patient Info\" tab in inbasket, or \"Choose Columns\" in Meds & Orders section of the refill encounter.              Passed - Blood pressure under 140/90 in past 12 months     BP Readings from Last 3 Encounters:   03/02/21 117/73   02/04/21 113/68   01/14/21 112/77                 Passed - Patient has mammogram in past 2 years on file if age 50-75        Passed - Medication is active on med list        Passed - Patient is 18 years of age or older        Passed - No active pregnancy on record        Passed - No positive pregnancy test on record in past 12 months           Please advise. Patient does not meet protocol. Patient last seen on 4/6/2020. Arleth Delgado RN on 4/19/2021 at 11:57 AM   "

## 2021-04-19 NOTE — TELEPHONE ENCOUNTER
Estradiol   Last Written Prescription Date:  4/6/2020  Last Fill Quantity: 12,  # refills: 3   Last office visit: Visit date 4/6/2020with prescribing provider:  Priyank   Future Office Visit:  raegan

## 2021-04-22 DIAGNOSIS — J31.0 CHRONIC RHINITIS: ICD-10-CM

## 2021-04-22 RX ORDER — FLUTICASONE PROPIONATE 50 MCG
1-2 SPRAY, SUSPENSION (ML) NASAL DAILY
Qty: 48 G | Refills: 1 | Status: SHIPPED | OUTPATIENT
Start: 2021-04-22 | End: 2021-07-13

## 2021-04-22 NOTE — TELEPHONE ENCOUNTER
"Prescription approved per [facility] Refill Protocol.    Requested Prescriptions   Pending Prescriptions Disp Refills     fluticasone (FLONASE) 50 MCG/ACT nasal spray [Pharmacy Med Name: fluticasone propionate 50 mcg/actuation nasal spray,suspension] 48 mL 11     Sig: Spray 1-2 sprays into both nostrils daily       Nasal Allergy Protocol Passed - 4/22/2021 11:31 AM        Passed - Patient is age 12 or older        Passed - Recent (12 mo) or future (30 days) visit within the authorizing provider's specialty     Patient has had an office visit with the authorizing provider or a provider within the authorizing providers department within the previous 12 mos or has a future within next 30 days. See \"Patient Info\" tab in inbasket, or \"Choose Columns\" in Meds & Orders section of the refill encounter.              Passed - Medication is active on med list           Mayte HUNT RN, BSN      "

## 2021-06-09 DIAGNOSIS — Z11.59 ENCOUNTER FOR SCREENING FOR OTHER VIRAL DISEASES: ICD-10-CM

## 2021-06-17 DIAGNOSIS — Z11.59 ENCOUNTER FOR SCREENING FOR OTHER VIRAL DISEASES: ICD-10-CM

## 2021-06-17 LAB
LABORATORY COMMENT REPORT: NORMAL
SARS-COV-2 RNA RESP QL NAA+PROBE: NEGATIVE
SARS-COV-2 RNA RESP QL NAA+PROBE: NORMAL
SPECIMEN SOURCE: NORMAL
SPECIMEN SOURCE: NORMAL

## 2021-06-17 PROCEDURE — U0005 INFEC AGEN DETEC AMPLI PROBE: HCPCS | Performed by: OPHTHALMOLOGY

## 2021-06-17 PROCEDURE — U0003 INFECTIOUS AGENT DETECTION BY NUCLEIC ACID (DNA OR RNA); SEVERE ACUTE RESPIRATORY SYNDROME CORONAVIRUS 2 (SARS-COV-2) (CORONAVIRUS DISEASE [COVID-19]), AMPLIFIED PROBE TECHNIQUE, MAKING USE OF HIGH THROUGHPUT TECHNOLOGIES AS DESCRIBED BY CMS-2020-01-R: HCPCS | Performed by: OPHTHALMOLOGY

## 2021-06-18 ENCOUNTER — ANESTHESIA EVENT (OUTPATIENT)
Dept: SURGERY | Facility: CLINIC | Age: 76
End: 2021-06-18
Payer: COMMERCIAL

## 2021-06-18 NOTE — ANESTHESIA PREPROCEDURE EVALUATION
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]   Procedure : Procedure(s):  Cataract Extraction with Implant     Past Medical History:   Diagnosis Date     Arthritis      Contact dermatitis and other eczema, due to unspecified cause      Dysplasia of cervix, unspecified       Past Surgical History:   Procedure Laterality Date     C APPENDECTOMY       HC CORRECT BUNION,SIMPLE  90    Right foot     HC REMOVAL OF TONSILS,<15 Y/O       HYSTERECTOMY, PAP NO LONGER INDICATED       HYSTERECTOMY, VAGINAL       SURGICAL HISTORY OF -   95    Bunion deformity & fractured tibial sesamoid left foot      Allergies   Allergen Reactions     Seasonal Allergies       Social History     Tobacco Use     Smoking status: Never Smoker     Smokeless tobacco: Never Used   Substance Use Topics     Alcohol use: Yes     Comment: occasional wine      Wt Readings from Last 1 Encounters:   21 67.6 kg (149 lb)        Anesthesia Evaluation   Pt has had prior anesthetic. Type: General and MAC.    No history of anesthetic complications       ROS/MED HX  ENT/Pulmonary: Comment: Cataract      (+) allergic rhinitis,     Neurologic:  - neg neurologic ROS     Cardiovascular:  - neg cardiovascular ROS   (+) Dyslipidemia -----Previous cardiac testing   Echo: Date: Results:    Stress Test: Date: Results:    ECG Reviewed: Date: 16 Results:  Sinus Rhythm   Low voltage in precordial leads.     ABNORMAL     Cath: Date: Results:      METS/Exercise Tolerance: >4 METS    Hematologic:       Musculoskeletal:   (+) arthritis,     GI/Hepatic:       Renal/Genitourinary:  - neg Renal ROS     Endo:  - neg endo ROS     Psychiatric/Substance Use:  - neg psychiatric ROS     Infectious Disease:  - neg infectious disease ROS     Malignancy:  - neg malignancy ROS     Other: Comment: Cervical dysplasia - neg other ROS           Physical Exam    Airway        Mallampati: II   TM distance: > 3 FB   Neck ROM: full   Mouth opening: > 3 cm    Respiratory Devices and Support         Dental  no notable dental history         Cardiovascular   cardiovascular exam normal          Pulmonary   pulmonary exam normal                OUTSIDE LABS:  CBC:   Lab Results   Component Value Date    WBC 11.4 (H) 06/07/2016    WBC 9.0 05/23/2005    HGB 13.8 06/07/2016    HGB 13.1 05/23/2005    HCT 41.4 06/07/2016    HCT 38.0 05/23/2005     06/07/2016     05/23/2005     BMP:   Lab Results   Component Value Date     02/22/2021     06/07/2016    POTASSIUM 3.8 02/22/2021    POTASSIUM 3.9 06/07/2016    CHLORIDE 105 02/22/2021    CHLORIDE 105 06/07/2016    CO2 31 02/22/2021    CO2 25 06/07/2016    BUN 12 02/22/2021    BUN 9 06/07/2016    CR 0.74 02/22/2021    CR 0.70 06/07/2016    GLC 87 02/22/2021    GLC 95 06/07/2016     COAGS: No results found for: PTT, INR, FIBR  POC: No results found for: BGM, HCG, HCGS  HEPATIC:   Lab Results   Component Value Date    ALBUMIN 3.6 06/07/2016    PROTTOTAL 7.3 06/07/2016    ALT 15 06/07/2016    AST 14 06/07/2016    ALKPHOS 61 06/07/2016    BILITOTAL 1.0 06/07/2016     OTHER:   Lab Results   Component Value Date    LACT 0.7 06/07/2016    FAUSTINA 9.3 02/22/2021    TSH 3.50 09/11/2007    CRP 0.41 04/11/2005       Anesthesia Plan    ASA Status:  2   NPO Status:  NPO Appropriate    Anesthesia Type: MAC.     - Reason for MAC: straight local not clinically adequate              Consents    Anesthesia Plan(s) and associated risks, benefits, and realistic alternatives discussed. Questions answered and patient/representative(s) expressed understanding.     - Discussed with:  Patient         Postoperative Care            Comments:                KRISTIN Enamorado CRNA

## 2021-06-21 ENCOUNTER — ANESTHESIA (OUTPATIENT)
Dept: SURGERY | Facility: CLINIC | Age: 76
End: 2021-06-21
Payer: COMMERCIAL

## 2021-06-21 ENCOUNTER — HOSPITAL ENCOUNTER (OUTPATIENT)
Facility: CLINIC | Age: 76
Discharge: HOME | End: 2021-06-21
Attending: OPHTHALMOLOGY | Admitting: OPHTHALMOLOGY
Payer: COMMERCIAL

## 2021-06-21 VITALS
RESPIRATION RATE: 16 BRPM | HEART RATE: 78 BPM | OXYGEN SATURATION: 97 % | WEIGHT: 149 LBS | TEMPERATURE: 98.1 F | BODY MASS INDEX: 29.25 KG/M2 | DIASTOLIC BLOOD PRESSURE: 60 MMHG | HEIGHT: 60 IN | SYSTOLIC BLOOD PRESSURE: 123 MMHG

## 2021-06-21 PROCEDURE — 370N000004 HC ANESTHESIA CATARACT PACKAGE: Performed by: OPHTHALMOLOGY

## 2021-06-21 PROCEDURE — 258N000003 HC RX IP 258 OP 636: Performed by: NURSE ANESTHETIST, CERTIFIED REGISTERED

## 2021-06-21 PROCEDURE — 250N000009 HC RX 250: Performed by: NURSE ANESTHETIST, CERTIFIED REGISTERED

## 2021-06-21 PROCEDURE — V2632 POST CHMBR INTRAOCULAR LENS: HCPCS | Performed by: OPHTHALMOLOGY

## 2021-06-21 PROCEDURE — 250N000011 HC RX IP 250 OP 636: Performed by: OPHTHALMOLOGY

## 2021-06-21 PROCEDURE — 250N000011 HC RX IP 250 OP 636: Performed by: NURSE ANESTHETIST, CERTIFIED REGISTERED

## 2021-06-21 PROCEDURE — 761N000008 HC RECOVERY CATRACT PACKAGE: Performed by: OPHTHALMOLOGY

## 2021-06-21 PROCEDURE — 250N000009 HC RX 250: Performed by: OPHTHALMOLOGY

## 2021-06-21 PROCEDURE — 360N000007 HC CATARACT SURGICAL PACKAGE: Performed by: OPHTHALMOLOGY

## 2021-06-21 DEVICE — EYE IMP IOL AMO PCL TECNIS ZCB00 21.0: Type: IMPLANTABLE DEVICE | Site: EYE | Status: FUNCTIONAL

## 2021-06-21 RX ORDER — SODIUM CHLORIDE, SODIUM LACTATE, POTASSIUM CHLORIDE, CALCIUM CHLORIDE 600; 310; 30; 20 MG/100ML; MG/100ML; MG/100ML; MG/100ML
INJECTION, SOLUTION INTRAVENOUS CONTINUOUS
Status: CANCELLED | OUTPATIENT
Start: 2021-06-21

## 2021-06-21 RX ORDER — TROPICAMIDE 10 MG/ML
1 SOLUTION/ DROPS OPHTHALMIC
Status: COMPLETED | OUTPATIENT
Start: 2021-06-21 | End: 2021-06-21

## 2021-06-21 RX ORDER — LIDOCAINE HYDROCHLORIDE 20 MG/ML
JELLY TOPICAL PRN
Status: DISCONTINUED | OUTPATIENT
Start: 2021-06-21 | End: 2021-06-21 | Stop reason: HOSPADM

## 2021-06-21 RX ORDER — LIDOCAINE 40 MG/G
CREAM TOPICAL
Status: DISCONTINUED | OUTPATIENT
Start: 2021-06-21 | End: 2021-06-21 | Stop reason: HOSPADM

## 2021-06-21 RX ORDER — BALANCED SALT SOLUTION 6.4; .75; .48; .3; 3.9; 1.7 MG/ML; MG/ML; MG/ML; MG/ML; MG/ML; MG/ML
SOLUTION OPHTHALMIC PRN
Status: DISCONTINUED | OUTPATIENT
Start: 2021-06-21 | End: 2021-06-21 | Stop reason: HOSPADM

## 2021-06-21 RX ORDER — PROPARACAINE HYDROCHLORIDE 5 MG/ML
SOLUTION/ DROPS OPHTHALMIC PRN
Status: DISCONTINUED | OUTPATIENT
Start: 2021-06-21 | End: 2021-06-21 | Stop reason: HOSPADM

## 2021-06-21 RX ORDER — SODIUM CHLORIDE, SODIUM LACTATE, POTASSIUM CHLORIDE, CALCIUM CHLORIDE 600; 310; 30; 20 MG/100ML; MG/100ML; MG/100ML; MG/100ML
INJECTION, SOLUTION INTRAVENOUS CONTINUOUS
Status: DISCONTINUED | OUTPATIENT
Start: 2021-06-21 | End: 2021-06-21 | Stop reason: HOSPADM

## 2021-06-21 RX ADMIN — CYCLOPENTOLATE HYDROCHLORIDE AND PHENYLEPHRINE HYDROCHLORIDE 1 DROP: 2; 10 SOLUTION/ DROPS OPHTHALMIC at 09:27

## 2021-06-21 RX ADMIN — LIDOCAINE HYDROCHLORIDE 0.1 ML: 10 INJECTION, SOLUTION EPIDURAL; INFILTRATION; INTRACAUDAL; PERINEURAL at 09:47

## 2021-06-21 RX ADMIN — CYCLOPENTOLATE HYDROCHLORIDE AND PHENYLEPHRINE HYDROCHLORIDE 1 DROP: 2; 10 SOLUTION/ DROPS OPHTHALMIC at 09:17

## 2021-06-21 RX ADMIN — TROPICAMIDE 1 DROP: 10 SOLUTION/ DROPS OPHTHALMIC at 09:27

## 2021-06-21 RX ADMIN — TROPICAMIDE 1 DROP: 10 SOLUTION/ DROPS OPHTHALMIC at 09:22

## 2021-06-21 RX ADMIN — MIDAZOLAM 2 MG: 1 INJECTION INTRAMUSCULAR; INTRAVENOUS at 10:03

## 2021-06-21 RX ADMIN — SODIUM CHLORIDE, POTASSIUM CHLORIDE, SODIUM LACTATE AND CALCIUM CHLORIDE: 600; 310; 30; 20 INJECTION, SOLUTION INTRAVENOUS at 09:47

## 2021-06-21 RX ADMIN — CYCLOPENTOLATE HYDROCHLORIDE AND PHENYLEPHRINE HYDROCHLORIDE 1 DROP: 2; 10 SOLUTION/ DROPS OPHTHALMIC at 09:22

## 2021-06-21 RX ADMIN — TROPICAMIDE 1 DROP: 10 SOLUTION/ DROPS OPHTHALMIC at 09:17

## 2021-06-21 ASSESSMENT — MIFFLIN-ST. JEOR: SCORE: 1097.36

## 2021-06-21 NOTE — ANESTHESIA POSTPROCEDURE EVALUATION
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    Anesthesia Type:  MAC    Note:  Disposition: Outpatient   Postop Pain Control: Uneventful            Sign Out: Well controlled pain   PONV: No   Neuro/Psych: Uneventful            Sign Out: Acceptable/Baseline neuro status   Airway/Respiratory: Uneventful            Sign Out: Acceptable/Baseline resp. status   CV/Hemodynamics: Uneventful            Sign Out: Acceptable CV status; No obvious hypovolemia; No obvious fluid overload   Other NRE: NONE   DID A NON-ROUTINE EVENT OCCUR?            Last vitals:  Vitals:    06/21/21 0900   BP: 132/67   Pulse: 78   Temp: 36.7  C (98.1  F)   SpO2: 97%       Last vitals prior to Anesthesia Care Transfer:  CRNA VITALS  6/21/2021 0953 - 6/21/2021 1025      6/21/2021             Pulse:  68    SpO2:  98 %          Electronically Signed By: KRISTIN Parker CRNA  June 21, 2021  10:25 AM

## 2021-06-21 NOTE — ANESTHESIA CARE TRANSFER NOTE
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    Anesthesia Type:   MAC     Note:    Oropharynx: oropharynx clear of all foreign objects  Level of Consciousness: awake  Oxygen Supplementation: room air    Independent Airway: airway patency satisfactory and stable  Dentition: dentition unchanged  Vital Signs Stable: post-procedure vital signs reviewed and stable  Report to RN Given: handoff report given  Patient transferred to: Phase II    Handoff Report: Identifed the Patient, Identified the Reponsible Provider, Reviewed the pertinent medical history, Discussed the surgical course, Reviewed Intra-OP anesthesia mangement and issues during anesthesia, Set expectations for post-procedure period and Allowed opportunity for questions and acknowledgement of understanding      Vitals: (Last set prior to Anesthesia Care Transfer)  CRNA VITALS  6/21/2021 0953 - 6/21/2021 1025      6/21/2021             Pulse:  68    SpO2:  98 %        Electronically Signed By: KRISTIN Parker CRNA  June 21, 2021  10:25 AM

## 2021-06-21 NOTE — OP NOTE
OPHTHALMOLOGY OPERATIVE NOTE    PATIENT: Camelia Bangura  DATE OF SURGERY: 6/21/2021  PREOPERATIVE DIAGNOSIS:  Senile Nuclear Cataract, Right eye  POSTOPERATIVE DIAGNOSIS:  Senile Nuclear Cataract, Right eye  OPERATIVE PROCEDURE:  Phacoemulsification with placement of intraocular lens  SURGEON:  Alexander Mendoza MD  ANESTHESIA:  Topical / MAC  EBL:  None  SPECIMENS:  None  COMPLICATIONS:  None    PROCEDURE:  The patient was brought to the operating room at [facility].  The right eye was prepped and draped in the usual fashion for cataract surgery.  A wire lid speculum was inserted.  A super sharp blade was used to make a paracentesis at the 11 O'clock position.  The super sharp blade was used to make a partial thickness temporal groove, which was 3 mm in length.  0.8 mL of non-preserved epi-Shugarcaine was injected into the anterior chamber.  Viscoelastic was used to inflate the anterior chamber through a cannula.  A 2.5 mm microkeratome was used to make a temporal clear corneal incision in a two-plane fashion.  A cystotome needle and forceps were used to make a capsulorrhexis.  Hydrodissection and hydrodelineation were performed with Balance Salt Solution.  The lens was then phacoemulsified and removed without complications.  The cortical material was removed with bimanual irrigation and aspiration.  The capsular bag was filled with viscoelastic.  A posterior chamber intraocular lens, preselected and recorded, was folded and inserted into the capsular bag.  The viscoelastic was removed with the irrigation and aspiration tip.  Balanced Salt Solution with Vigamox, 150mg/0.1mL, was used to refill the anterior chamber.  The wounds were checked for water tightness and required no suture.  The wire lid speculum was removed.  The patient's right eye was cleaned and a drop of each post-operative drop was placed, followed by a schulte shield.  The patient tolerated the procedure well, and there  were no complications.      Alexander Mendoza MD

## 2021-07-07 DIAGNOSIS — Z11.59 ENCOUNTER FOR SCREENING FOR OTHER VIRAL DISEASES: ICD-10-CM

## 2021-07-13 ENCOUNTER — OFFICE VISIT (OUTPATIENT)
Dept: FAMILY MEDICINE | Facility: CLINIC | Age: 76
End: 2021-07-13
Payer: COMMERCIAL

## 2021-07-13 VITALS
HEIGHT: 60 IN | DIASTOLIC BLOOD PRESSURE: 68 MMHG | RESPIRATION RATE: 20 BRPM | TEMPERATURE: 97.7 F | BODY MASS INDEX: 29.25 KG/M2 | HEART RATE: 74 BPM | WEIGHT: 149 LBS | SYSTOLIC BLOOD PRESSURE: 128 MMHG | OXYGEN SATURATION: 98 %

## 2021-07-13 DIAGNOSIS — H61.23 BILATERAL IMPACTED CERUMEN: Primary | ICD-10-CM

## 2021-07-13 DIAGNOSIS — H60.393 INFECTIVE OTITIS EXTERNA, BILATERAL: ICD-10-CM

## 2021-07-13 PROCEDURE — 99213 OFFICE O/P EST LOW 20 MIN: CPT | Mod: 25 | Performed by: NURSE PRACTITIONER

## 2021-07-13 PROCEDURE — 69209 REMOVE IMPACTED EAR WAX UNI: CPT | Mod: 59 | Performed by: NURSE PRACTITIONER

## 2021-07-13 PROCEDURE — 69210 REMOVE IMPACTED EAR WAX UNI: CPT | Mod: LT | Performed by: NURSE PRACTITIONER

## 2021-07-13 RX ORDER — NEOMYCIN SULFATE, POLYMYXIN B SULFATE, HYDROCORTISONE 3.5; 10000; 1 MG/ML; [USP'U]/ML; MG/ML
3 SOLUTION/ DROPS AURICULAR (OTIC) 4 TIMES DAILY
Qty: 5 ML | Refills: 0 | Status: SHIPPED | OUTPATIENT
Start: 2021-07-13 | End: 2021-07-20

## 2021-07-13 ASSESSMENT — MIFFLIN-ST. JEOR: SCORE: 1097.36

## 2021-07-13 NOTE — PROGRESS NOTES
Assessment & Plan     Bilateral impacted cerumen  Ears flushed and small amount taken out with currette on left ear.  Ear canals evaluated and show red/boggy canals on both ears.  Recommend follow-up if ear wax builds up again.    Infective otitis externa, bilateral  Treating with cortisporin for 7 days.  Recommend nothing else in the ears besides antibiotic drops.  Follow-up if any worsening or persistent symptoms.  - neomycin-polymyxin-hydrocortisone (CORTISPORIN) [id number] otic solution; Place 3 drops in ear(s) 4 times daily for 7 days    See Patient Instructions    Return in about 1 week (around 7/20/2021), or if symptoms worsen or fail to improve.    Kassandra Marrero NP  [facility] is a 76 year old who presents for the following health issues  accompanied by her self:    HPI     Concern - plugged ears, bi-lateral  Onset: about a month  Description: trouble hearing, plugged feeling  Intensity: mild  Progression of Symptoms:  worsening  Accompanying Signs & Symptoms: none  Previous history of similar problem: yes, has had to have ears flushed in the past  Precipitating factors:        Worsened by: none  Alleviating factors:        Improved by: none  Therapies tried and outcome: wax softening drops, OTC    Review of Systems   CONSTITUTIONAL: NEGATIVE for fever, chills, change in weight  ENT/MOUTH: POSITIVE for ears feeling plugged, hx of ear wax impaction and ear flushing, worse on the left ear  RESP: NEGATIVE for significant cough or SOB  CV: NEGATIVE for chest pain, palpitations or peripheral edema  PSYCHIATRIC: NEGATIVE for changes in mood or affect  ROS otherwise negative      Objective    /68   Pulse 74   Temp 97.7  F (36.5  C) (Tympanic)   Resp 20   Ht 1.524 m (5')   Wt 67.6 kg (149 lb)   SpO2 98%   BMI 29.10 kg/m    Body mass index is 29.1 kg/m .  Physical Exam   GENERAL: healthy, alert and no distress  HENT: normal cephalic/atraumatic and  both ears: occluded with wax  PSYCH: mentation appears normal, affect normal/bright

## 2021-07-13 NOTE — NURSING NOTE
Patient identified using two patient identifiers.  Ear exam showing wax occlusion completed by provider.  Solution: warm water was placed in the bilateral ear(s) via syringe .Some was was removed from both ears. Patient stated she started having some pain. Procedure was stopped and Kassandra Marrero NP was advised to return to room to re examine.   Andres TRUONG CMA

## 2021-07-13 NOTE — PATIENT INSTRUCTIONS
1.  Nothing in the ears while on antibiotic drops.  2.  Use antibiotic drops as directed.  3.  Follow-up as needed if any persistent or worsening symptoms occur.      Patient Education     External Ear Infection (Adult)    External otitis (also called  swimmer s ear ) is an infection in the ear canal. It's often caused by bacteria or fungus. It can occur a few days after water gets trapped in the ear canal (from swimming or bathing). It can also occur after cleaning too deeply in the ear canal with a cotton swab or other object. Sometimes, hair care products get into the ear canal and cause this problem.   Symptoms can include pain, fever, itching, redness, drainage, or swelling of the ear canal. Temporary hearing loss may also occur.   Home care    Don't try to clean the ear canal. This can push pus and bacteria deeper into the canal.    Use prescribed ear drops as directed. These help reduce swelling and fight the infection. If an ear wick was placed in the ear canal, apply drops right onto the end of the wick. The wick will draw the medicine into the ear canal even if it's swollen closed.    A cotton ball may be loosely placed in the outer ear to absorb any drainage.    You may use over-the-counter medicines to control pain as directed by the healthcare provider, unless another medicine was prescribed. Talk with your provider before using these medicines if you have chronic liver or kidney disease or ever had a stomach ulcer or digestive tract bleeding.    Don't allow water to get into your ear when bathing. Also don't swim until the infection has cleared.    Prevention    Keep your ears dry. This helps lower the risk of infection. Dry your ears with a towel or hair dryer after getting wet. Also, use ear plugs when swimming.    Don't stick any objects in the ear to remove wax.    Talk with your provider about using ear drops to prevent swimmer's ear in case you feel water trapped in your ear canal. You can get  these drops over the counter at most drugstores. They work by removing water from the ear canal.    Follow-up care  Follow up with your healthcare provider in 1 week, or as advised.   When to seek medical advice  Call your healthcare provider right away if any of these occur:     Ear pain becomes worse or doesn t improve after 3 days of treatment    Redness or swelling of the outer ear occurs or gets worse    Headache    Fever of 100.4 F (38 C) or higher, or as directed by your healthcare provider  Call 911  Call 911 or get immediate medical care if any of the following occur:     Seizure    Unusual drowsiness or confusion    Unusual painful or stiff neck    [employer] last reviewed this educational content on 8/1/2020 2000-2021 [employer]. All rights reserved. This information is not intended as a substitute for professional medical care. Always follow your healthcare professional's instructions.           Patient Education     Impacted Earwax     Inner ear structures including ear canal and eardrum.   Impacted earwax is a buildup of the natural wax in the ear. Impacted earwax is very common. It can cause symptoms such as hearing loss. It can also make it hard for a healthcare provider to check your ear.   Understanding earwax  Tiny glands in your ear make substances that combine with dead skin cells to form earwax. Earwax helps protect your ear canal from water, dirt, infection, and injury. Over time, earwax travels from the inner part of your ear canal to the entrance of the canal. Then it falls away naturally. But in some cases, it can t travel to the entrance of the canal. This may be because of a health condition or objects put in the ear. With age, earwax tends to become harder and less fluid. Older adults are more likely to have problems with earwax buildup.   What causes impacted earwax?  Earwax can build up because of many health conditions. Some cause a physical blockage. Others cause too  much earwax to be made. Health conditions that can cause earwax buildup include:     Bony blockage in the ear (osteoma or exostoses)    Infections, such as an outer ear infection (external otitis)    Skin disease, such as eczema    Autoimmune diseases, such as lupus    A narrowed ear canal from birth, chronic inflammation, or injury    Too much earwax because of injury    Too much earwax because of  water in the ear canal  Putting objects in the ear again and again can also cause impacted earwax. For example, putting cotton swabs in the ear may push the wax deeper into the ear. Over time, this may cause blockage. Hearing aids, swimming plugs, and swim molds can also cause this problem when used again and again.   In some cases, the cause of impacted earwax is not known.  Symptoms of impacted earwax  Excess earwax often does not cause any symptoms, unless there is a large amount of buildup. Then it may cause symptoms such as:     Hearing loss    Earache    Sense of ear fullness    Itching in the ear    Odor from the ear    Ear drainage    Dizziness    Ringing in the ears    Cough  Treatment for impacted earwax  If you don t have symptoms, you may not need treatment. Often the earwax goes away on its own with time. If you have symptoms, you may have 1 or more treatments such as:     Ear drops to soften the earwax. This helps it leave the ear over time.    Rinsing the ear canal with water. This is done in a healthcare provider s office.    Removing the earwax with small tools. This is also done in a provider s office.  In rare cases, some treatments for earwax removal may cause complications such as:    Outer ear infection    Earache    Short-term hearing loss    Dizziness    Water trapped in the ear canal    Hole in the eardrum    Ringing in the ears    Bleeding from the ear  Talk with your healthcare provider about which risks apply most to you.  Healthcare providers don't advise using ear candles or ear vacuum kits.  These methods are not shown to work and may cause problems.   Preventing impacted earwax  You may not be able to prevent impacted earwax if you have a health condition that causes it, such as eczema. In other cases, you may be able to prevent earwax buildup by:     Using ear drops once a week    Having a regular ear cleaning about every 6 months    Not using cotton swabs in the ear  When to call the healthcare provider  Call your healthcare provider right away if you have:     Symptoms of impacted earwax    Severe symptoms after earwax removal, such as bleeding or severe ear pain    [employer] last reviewed this educational content on 9/1/2019 2000-2021 [employer]. All rights reserved. This information is not intended as a substitute for professional medical care. Always follow your healthcare professional's instructions.

## 2021-07-18 ENCOUNTER — LAB (OUTPATIENT)
Dept: FAMILY MEDICINE | Facility: CLINIC | Age: 76
End: 2021-07-18
Attending: OPHTHALMOLOGY
Payer: COMMERCIAL

## 2021-07-18 DIAGNOSIS — Z11.59 ENCOUNTER FOR SCREENING FOR OTHER VIRAL DISEASES: ICD-10-CM

## 2021-07-18 PROCEDURE — U0005 INFEC AGEN DETEC AMPLI PROBE: HCPCS

## 2021-07-18 PROCEDURE — U0003 INFECTIOUS AGENT DETECTION BY NUCLEIC ACID (DNA OR RNA); SEVERE ACUTE RESPIRATORY SYNDROME CORONAVIRUS 2 (SARS-COV-2) (CORONAVIRUS DISEASE [COVID-19]), AMPLIFIED PROBE TECHNIQUE, MAKING USE OF HIGH THROUGHPUT TECHNOLOGIES AS DESCRIBED BY CMS-2020-01-R: HCPCS

## 2021-07-19 LAB — SARS-COV-2 RNA RESP QL NAA+PROBE: NEGATIVE

## 2021-07-20 ENCOUNTER — ANESTHESIA EVENT (OUTPATIENT)
Dept: SURGERY | Facility: CLINIC | Age: 76
End: 2021-07-20
Payer: COMMERCIAL

## 2021-07-20 NOTE — ANESTHESIA PREPROCEDURE EVALUATION
Anesthesia Pre-Procedure Evaluation    Patient: Camelia Bangura   MRN: [id number] : 1945        Preoperative Diagnosis: Nuclear sclerosis [H25.10]   Procedure : Procedure(s):  Cataract Extraction with Implant     Past Medical History:   Diagnosis Date     Arthritis      Contact dermatitis and other eczema, due to unspecified cause      Dysplasia of cervix, unspecified       Past Surgical History:   Procedure Laterality Date     C APPENDECTOMY       HC CORRECT BUNION,SIMPLE  89    Right foot     HC REMOVAL OF TONSILS,<15 Y/O       HYSTERECTOMY, PAP NO LONGER INDICATED       HYSTERECTOMY, VAGINAL       PHACOEMULSIFICATION WITH STANDARD INTRAOCULAR LENS IMPLANT Right 2021    Procedure: Right eye Cataract Extraction with Implant;  Surgeon: Alexander Mendoza MD;  Location: [facility]     SURGICAL HISTORY OF -   89    Bunion deformity & fractured tibial sesamoid left foot      Allergies   Allergen Reactions     Seasonal Allergies       Social History     Tobacco Use     Smoking status: Never Smoker     Smokeless tobacco: Never Used   Substance Use Topics     Alcohol use: Yes     Comment: occasional wine      Wt Readings from Last 1 Encounters:   21 67.6 kg (149 lb)        Anesthesia Evaluation   Pt has had prior anesthetic. Type: MAC and General.    No history of anesthetic complications       ROS/MED HX  ENT/Pulmonary:     (+) allergic rhinitis,     Neurologic:  - neg neurologic ROS     Cardiovascular:     (+) Dyslipidemia -----Previous cardiac testing   Echo: Date: Results:    Stress Test: Date: Results:    ECG Reviewed: Date:  Results:  Sinus Rhythm   Low voltage in precordial leads.     ABNORMAL   Cath: Date: Results:      METS/Exercise Tolerance:     Hematologic:  - neg hematologic  ROS     Musculoskeletal:   (+) arthritis,     GI/Hepatic:  - neg GI/hepatic ROS     Renal/Genitourinary:  - neg Renal ROS     Endo:     (+) Obesity,     Psychiatric/Substance Use:  -  neg psychiatric ROS     Infectious Disease:  - neg infectious disease ROS     Malignancy:  - neg malignancy ROS     Other:  - neg other ROS          Physical Exam    Airway        Mallampati: II   TM distance: > 3 FB   Neck ROM: full   Mouth opening: > 3 cm    Respiratory Devices and Support         Dental  no notable dental history         Cardiovascular   cardiovascular exam normal          Pulmonary   pulmonary exam normal                OUTSIDE LABS:  CBC:   Lab Results   Component Value Date    WBC 11.4 (H) 06/07/2016    WBC 9.0 05/23/2005    HGB 13.8 06/07/2016    HGB 13.1 05/23/2005    HCT 41.4 06/07/2016    HCT 38.0 05/23/2005     06/07/2016     05/23/2005     BMP:   Lab Results   Component Value Date     02/22/2021     06/07/2016    POTASSIUM 3.8 02/22/2021    POTASSIUM 3.9 06/07/2016    CHLORIDE 105 02/22/2021    CHLORIDE 105 06/07/2016    CO2 31 02/22/2021    CO2 25 06/07/2016    BUN 12 02/22/2021    BUN 9 06/07/2016    CR 0.74 02/22/2021    CR 0.70 06/07/2016    GLC 87 02/22/2021    GLC 95 06/07/2016     COAGS: No results found for: PTT, INR, FIBR  POC: No results found for: BGM, HCG, HCGS  HEPATIC:   Lab Results   Component Value Date    ALBUMIN 3.6 06/07/2016    PROTTOTAL 7.3 06/07/2016    ALT 15 06/07/2016    AST 14 06/07/2016    ALKPHOS 61 06/07/2016    BILITOTAL 1.0 06/07/2016     OTHER:   Lab Results   Component Value Date    LACT 0.7 06/07/2016    FAUSTINA 9.3 02/22/2021    TSH 3.50 09/11/2007    CRP 0.41 04/11/2005       Anesthesia Plan    ASA Status:  2   NPO Status:  NPO Appropriate    Anesthesia Type: MAC.              Consents    Anesthesia Plan(s) and associated risks, benefits, and realistic alternatives discussed. Questions answered and patient/representative(s) expressed understanding.     - Discussed with:  Patient      - Extended Intubation/Ventilatory Support Discussed: No.      - Patient is DNR/DNI Status: No    Use of blood products discussed: No .      Postoperative Care            Comments:                KRISTIN Gómez CRNA

## 2021-07-21 ENCOUNTER — HOSPITAL ENCOUNTER (OUTPATIENT)
Facility: CLINIC | Age: 76
Discharge: HOME | End: 2021-07-21
Attending: OPHTHALMOLOGY | Admitting: OPHTHALMOLOGY
Payer: COMMERCIAL

## 2021-07-21 ENCOUNTER — ANESTHESIA (OUTPATIENT)
Dept: SURGERY | Facility: CLINIC | Age: 76
End: 2021-07-21
Payer: COMMERCIAL

## 2021-07-21 VITALS
DIASTOLIC BLOOD PRESSURE: 71 MMHG | RESPIRATION RATE: 16 BRPM | HEART RATE: 65 BPM | TEMPERATURE: 98.2 F | OXYGEN SATURATION: 98 % | BODY MASS INDEX: 29.25 KG/M2 | WEIGHT: 149 LBS | SYSTOLIC BLOOD PRESSURE: 125 MMHG | HEIGHT: 60 IN

## 2021-07-21 PROCEDURE — 258N000003 HC RX IP 258 OP 636: Performed by: NURSE ANESTHETIST, CERTIFIED REGISTERED

## 2021-07-21 PROCEDURE — 250N000009 HC RX 250: Performed by: NURSE ANESTHETIST, CERTIFIED REGISTERED

## 2021-07-21 PROCEDURE — 250N000011 HC RX IP 250 OP 636: Performed by: OPHTHALMOLOGY

## 2021-07-21 PROCEDURE — V2632 POST CHMBR INTRAOCULAR LENS: HCPCS | Performed by: OPHTHALMOLOGY

## 2021-07-21 PROCEDURE — 360N000007 HC CATARACT SURGICAL PACKAGE: Performed by: OPHTHALMOLOGY

## 2021-07-21 PROCEDURE — 761N000008 HC RECOVERY CATRACT PACKAGE: Performed by: OPHTHALMOLOGY

## 2021-07-21 PROCEDURE — 250N000009 HC RX 250: Performed by: OPHTHALMOLOGY

## 2021-07-21 PROCEDURE — 250N000011 HC RX IP 250 OP 636: Performed by: NURSE ANESTHETIST, CERTIFIED REGISTERED

## 2021-07-21 PROCEDURE — 370N000004 HC ANESTHESIA CATARACT PACKAGE: Performed by: OPHTHALMOLOGY

## 2021-07-21 DEVICE — EYE IMP IOL AMO PCL TECNIS ZCB00 21.5: Type: IMPLANTABLE DEVICE | Site: EYE | Status: FUNCTIONAL

## 2021-07-21 RX ORDER — LIDOCAINE HYDROCHLORIDE 20 MG/ML
JELLY TOPICAL PRN
Status: DISCONTINUED | OUTPATIENT
Start: 2021-07-21 | End: 2021-07-21 | Stop reason: HOSPADM

## 2021-07-21 RX ORDER — SODIUM CHLORIDE, SODIUM LACTATE, POTASSIUM CHLORIDE, CALCIUM CHLORIDE 600; 310; 30; 20 MG/100ML; MG/100ML; MG/100ML; MG/100ML
INJECTION, SOLUTION INTRAVENOUS CONTINUOUS
Status: CANCELLED | OUTPATIENT
Start: 2021-07-21

## 2021-07-21 RX ORDER — PROPARACAINE HYDROCHLORIDE 5 MG/ML
SOLUTION/ DROPS OPHTHALMIC PRN
Status: DISCONTINUED | OUTPATIENT
Start: 2021-07-21 | End: 2021-07-21 | Stop reason: HOSPADM

## 2021-07-21 RX ORDER — BALANCED SALT SOLUTION 6.4; .75; .48; .3; 3.9; 1.7 MG/ML; MG/ML; MG/ML; MG/ML; MG/ML; MG/ML
SOLUTION OPHTHALMIC PRN
Status: DISCONTINUED | OUTPATIENT
Start: 2021-07-21 | End: 2021-07-21 | Stop reason: HOSPADM

## 2021-07-21 RX ORDER — LIDOCAINE 40 MG/G
CREAM TOPICAL
Status: DISCONTINUED | OUTPATIENT
Start: 2021-07-21 | End: 2021-07-21 | Stop reason: HOSPADM

## 2021-07-21 RX ORDER — SODIUM CHLORIDE, SODIUM LACTATE, POTASSIUM CHLORIDE, CALCIUM CHLORIDE 600; 310; 30; 20 MG/100ML; MG/100ML; MG/100ML; MG/100ML
INJECTION, SOLUTION INTRAVENOUS CONTINUOUS
Status: DISCONTINUED | OUTPATIENT
Start: 2021-07-21 | End: 2021-07-21 | Stop reason: HOSPADM

## 2021-07-21 RX ORDER — TROPICAMIDE 10 MG/ML
1 SOLUTION/ DROPS OPHTHALMIC
Status: COMPLETED | OUTPATIENT
Start: 2021-07-21 | End: 2021-07-21

## 2021-07-21 RX ADMIN — CYCLOPENTOLATE HYDROCHLORIDE AND PHENYLEPHRINE HYDROCHLORIDE 1 DROP: 2; 10 SOLUTION/ DROPS OPHTHALMIC at 07:53

## 2021-07-21 RX ADMIN — SODIUM CHLORIDE, POTASSIUM CHLORIDE, SODIUM LACTATE AND CALCIUM CHLORIDE: 600; 310; 30; 20 INJECTION, SOLUTION INTRAVENOUS at 07:56

## 2021-07-21 RX ADMIN — TROPICAMIDE 1 DROP: 10 SOLUTION/ DROPS OPHTHALMIC at 07:41

## 2021-07-21 RX ADMIN — CYCLOPENTOLATE HYDROCHLORIDE AND PHENYLEPHRINE HYDROCHLORIDE 1 DROP: 2; 10 SOLUTION/ DROPS OPHTHALMIC at 07:59

## 2021-07-21 RX ADMIN — TROPICAMIDE 1 DROP: 10 SOLUTION/ DROPS OPHTHALMIC at 07:55

## 2021-07-21 RX ADMIN — LIDOCAINE HYDROCHLORIDE 0.1 ML: 10 INJECTION, SOLUTION EPIDURAL; INFILTRATION; INTRACAUDAL; PERINEURAL at 07:55

## 2021-07-21 RX ADMIN — CYCLOPENTOLATE HYDROCHLORIDE AND PHENYLEPHRINE HYDROCHLORIDE 1 DROP: 2; 10 SOLUTION/ DROPS OPHTHALMIC at 07:42

## 2021-07-21 RX ADMIN — MIDAZOLAM 2 MG: 1 INJECTION INTRAMUSCULAR; INTRAVENOUS at 09:31

## 2021-07-21 RX ADMIN — TROPICAMIDE 1 DROP: 10 SOLUTION/ DROPS OPHTHALMIC at 08:00

## 2021-07-21 ASSESSMENT — MIFFLIN-ST. JEOR: SCORE: 1097.36

## 2021-07-21 NOTE — ANESTHESIA CARE TRANSFER NOTE
Patient: Camelia Bangura    Procedure(s):  Cataract Extraction with Implant    Diagnosis: Nuclear sclerosis [H25.10]  Diagnosis Additional Information: No value filed.    Anesthesia Type:   MAC     Note:    Oropharynx: oropharynx clear of all foreign objects and spontaneously breathing  Level of Consciousness: awake  Oxygen Supplementation: room air    Independent Airway: airway patency satisfactory and stable  Dentition: dentition unchanged  Vital Signs Stable: post-procedure vital signs reviewed and stable  Report to RN Given: handoff report given  Patient transferred to: Phase II    Handoff Report: Identifed the Patient, Identified the Reponsible Provider, Reviewed the pertinent medical history, Discussed the surgical course, Reviewed Intra-OP anesthesia mangement and issues during anesthesia, Set expectations for post-procedure period and Allowed opportunity for questions and acknowledgement of understanding      Vitals:  Vitals Value Taken Time   BP     Temp     Pulse     Resp     SpO2         Electronically Signed By: KRISTIN Gómez CRNA  July 21, 2021  9:52 AM

## 2021-07-21 NOTE — ANESTHESIA POSTPROCEDURE EVALUATION
Patient: Camelia Bangura    Procedure(s):  Cataract Extraction with Implant    Diagnosis:Nuclear sclerosis [H25.10]  Diagnosis Additional Information: No value filed.    Anesthesia Type:  MAC    Note:  Disposition: Outpatient   Postop Pain Control: Uneventful            Sign Out: Well controlled pain   PONV: No   Neuro/Psych: Uneventful            Sign Out: Acceptable/Baseline neuro status   Airway/Respiratory: Uneventful            Sign Out: Acceptable/Baseline resp. status   CV/Hemodynamics: Uneventful            Sign Out: Acceptable CV status; No obvious hypovolemia; No obvious fluid overload   Other NRE: NONE   DID A NON-ROUTINE EVENT OCCUR? No           Last vitals:  Vitals Value Taken Time   /71 07/21/21 0954   Temp     Pulse 65 07/21/21 0954   Resp 16 07/21/21 0954   SpO2 95 % 07/21/21 0956   Vitals shown include unvalidated device data.    Electronically Signed By: KRISTIN Gómez CRNA  July 21, 2021  10:03 AM

## 2021-07-21 NOTE — OP NOTE
CATARACT OPERATIVE NOTE    PATIENT: Camelia Bangura  DATE OF SURGERY: 7/21/2021  PREOPERATIVE DIAGNOSIS:  Senile Nuclear Cataract, Left eye  POSTOPERATIVE DIAGNOSIS:  Senile Nuclear Cataract, Left eye  OPERATIVE PROCEDURE:  Phacoemulsification and placement of intraocular lens  SURGEON:  Alexander Mendoza MD  ANESTHESIA:  Topical / MAC  EBL:  None  SPECIMENS:  None  COMPLICATIONS:  None    PROCEDURE:  The patient was brought to the operating room at [facility].  The left eye was prepped and draped in the usual fashion for cataract surgery.  A wire lid speculum was inserted.  A super sharp blade was used to make a paracentesis at the 5 O'clock position.  The super sharp blade was used to make a partial thickness temporal groove, which was 3 mm in length.  0.8 mL of non-preserved epi-Shugarcaine was injected into the anterior chamber.  Viscoelastic was used to inflate the anterior chamber through a cannula.  A 2.5 mm microkeratome was used to make a temporal clear corneal incision in a two-plane fashion.  A cystotome needle and forceps were used to make a capsulorrhexis.  Hydrodissection and hydrodelineation were performed with Balance Salt Solution.  The lens was then phacoemulsified and removed without complications.  The cortical material was removed with bimanual irrigation and aspiration.  The capsular bag was filled with viscoelastic.  A posterior chamber intraocular lens, preselected and recorded, was folded and inserted into the capsular bag.  The viscoelastic was removed with the irrigation and aspiration tip.  Balanced Salt Solution with Vigamox, 150mg/0.1mL, was used to refill the anterior chamber.  The wounds were checked for water tightness and required no suture.  The wire lid speculum was removed.  The patient's left eye was cleaned and a drop of each post-operative drop was placed, followed by a schulte shield.  The patient tolerated the procedure well, and there were no  complications.      Alexander Mendoza MD

## 2021-07-27 DIAGNOSIS — E78.5 HYPERLIPIDEMIA LDL GOAL <130: ICD-10-CM

## 2021-07-29 RX ORDER — SIMVASTATIN 40 MG
TABLET ORAL
Qty: 30 TABLET | Refills: 0 | Status: SHIPPED | OUTPATIENT
Start: 2021-07-29 | End: 2021-10-22

## 2021-07-29 NOTE — TELEPHONE ENCOUNTER
Medication is being filled for 1 time refill only due to:  Patient needs to be seen because due for med review..     KIRSTEN Be

## 2021-09-08 ENCOUNTER — TELEPHONE (OUTPATIENT)
Dept: ORTHOPEDICS | Facility: CLINIC | Age: 76
End: 2021-09-08

## 2021-09-08 DIAGNOSIS — M17.0 PRIMARY OSTEOARTHRITIS OF BOTH KNEES: Primary | ICD-10-CM

## 2021-09-08 NOTE — TELEPHONE ENCOUNTER
Patient scheduled for appointment on 9/21/21 for discussion of viscosupplementation injection vs steroid injection of right knee.      Steroid  injection last completed 1/15/21.       Prior authorization referral for SynviscOne injection pended.     Please advise.    Loco Garrison ATC

## 2021-09-12 ENCOUNTER — HEALTH MAINTENANCE LETTER (OUTPATIENT)
Age: 76
End: 2021-09-12

## 2021-09-21 ENCOUNTER — OFFICE VISIT (OUTPATIENT)
Dept: ORTHOPEDICS | Facility: CLINIC | Age: 76
End: 2021-09-21
Payer: COMMERCIAL

## 2021-09-21 ENCOUNTER — ALLIED HEALTH/NURSE VISIT (OUTPATIENT)
Dept: FAMILY MEDICINE | Facility: CLINIC | Age: 76
End: 2021-09-21
Payer: COMMERCIAL

## 2021-09-21 VITALS — SYSTOLIC BLOOD PRESSURE: 137 MMHG | DIASTOLIC BLOOD PRESSURE: 71 MMHG | HEIGHT: 60 IN | BODY MASS INDEX: 29.1 KG/M2

## 2021-09-21 DIAGNOSIS — G89.29 CHRONIC PAIN OF RIGHT KNEE: Primary | ICD-10-CM

## 2021-09-21 DIAGNOSIS — Z23 NEED FOR VACCINATION: Primary | ICD-10-CM

## 2021-09-21 DIAGNOSIS — M25.561 CHRONIC PAIN OF RIGHT KNEE: Primary | ICD-10-CM

## 2021-09-21 DIAGNOSIS — M17.0 PRIMARY OSTEOARTHRITIS OF BOTH KNEES: ICD-10-CM

## 2021-09-21 PROCEDURE — 20611 DRAIN/INJ JOINT/BURSA W/US: CPT | Mod: RT | Performed by: FAMILY MEDICINE

## 2021-09-21 PROCEDURE — 99207 PR NO CHARGE NURSE ONLY: CPT

## 2021-09-21 PROCEDURE — G0008 ADMIN INFLUENZA VIRUS VAC: HCPCS

## 2021-09-21 PROCEDURE — 90662 IIV NO PRSV INCREASED AG IM: CPT

## 2021-09-21 PROCEDURE — 99213 OFFICE O/P EST LOW 20 MIN: CPT | Mod: 25 | Performed by: FAMILY MEDICINE

## 2021-09-21 RX ORDER — TRIAMCINOLONE ACETONIDE 40 MG/ML
40 INJECTION, SUSPENSION INTRA-ARTICULAR; INTRAMUSCULAR
Status: DISCONTINUED | OUTPATIENT
Start: 2021-09-21 | End: 2023-02-24

## 2021-09-21 RX ORDER — ROPIVACAINE HYDROCHLORIDE 5 MG/ML
3 INJECTION, SOLUTION EPIDURAL; INFILTRATION; PERINEURAL
Status: DISCONTINUED | OUTPATIENT
Start: 2021-09-21 | End: 2023-02-24

## 2021-09-21 RX ADMIN — TRIAMCINOLONE ACETONIDE 40 MG: 40 INJECTION, SUSPENSION INTRA-ARTICULAR; INTRAMUSCULAR at 15:50

## 2021-09-21 RX ADMIN — ROPIVACAINE HYDROCHLORIDE 3 ML: 5 INJECTION, SOLUTION EPIDURAL; INFILTRATION; PERINEURAL at 15:50

## 2021-09-21 NOTE — PROGRESS NOTES
Camelia Bangura  :  1945  DOS: 21  MRN: [id number]    Sports Medicine Clinic Visit    PCP: Chaya Mckenna    Camelia Bangura is a 75 year old female left hand dominant who is seen in consultation at the request of  Tracey Pitts M.D. presenting with acute right knee pain.    Injury: Gradual onset of right knee pain over the past 4 - 6 weeks after walking on slippery sidewalk.  Pain located over right deep anterior medial knee, nonradiating.  Additional Features:  Positive: grinding and weakness.  Symptoms are better with Rest.  Symptoms are worse with: deep knee bends, going from sit to stand, kneeling.  Other evaluation and/or treatments so far consists of: Ibuprofen and Rest.  Recent imaging completed: No recent imaging completed.  Prior History of related problems: history of similar right knee pain in 2017 that improved following steroid injection from her PCP - Dr Casas.    Second complaint of acute left thumb pain with possible contracture over the past 3 - 4 weeks.  Pain is worse with gripping/grasping and thumb flexion/opposition.  No prior treatment or imaging completed at this time.  No previous history of injury to left thumb.    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  Right knee steroid injection completed on 21 provided good relief for ~ 6 months.  Patient is traveling in ~ 10 days and would like to discuss repeat injection.  No new injury in the interim.    Review of Systems  Musculoskeletal: as above  Remainder of review of systems is negative including constitutional, CV, pulmonary, GI, Skin and Neurologic except as noted in HPI or medical history.    Past Medical History:   Diagnosis Date     Arthritis      Contact dermatitis and other eczema, due to unspecified cause      Dysplasia of cervix, unspecified       Past Surgical History:   Procedure Laterality Date     C APPENDECTOMY       HC CORRECT BUNION,SIMPLE  05/23/90    Right foot     HC REMOVAL OF TONSILS,<13 Y/O       HYSTERECTOMY, PAP NO LONGER INDICATED  4/05     HYSTERECTOMY, VAGINAL  4/05     PHACOEMULSIFICATION WITH STANDARD INTRAOCULAR LENS IMPLANT Right 6/21/2021    Procedure: Right eye Cataract Extraction with Implant;  Surgeon: Alexander Mendoza MD;  Location: [facility]     PHACOEMULSIFICATION WITH STANDARD INTRAOCULAR LENS IMPLANT Left 7/21/2021    Procedure: Cataract Extraction with Implant, left eye;  Surgeon: Alexander Mendoza MD;  Location: [facility]     SURGICAL HISTORY OF -   06/19/95    Bunion deformity & fractured tibial sesamoid left foot     Family History   Problem Relation Age of Onset     Cerebrovascular Disease Mother      Arthritis Mother      Osteoporosis Mother      Melanoma No family hx of        Objective  /71   Ht 1.524 m (5')   BMI 29.10 kg/m        General: healthy, alert and in no distress      HEENT: no scleral icterus or conjunctival erythema     Skin: no suspicious lesions or rash. No jaundice.     CV: regular rhythm by palpation, 2+ distal pulses, no pedal edema      Resp: normal respiratory effort without conversational dyspnea     Psych: normal mood and affect      Gait: nonantalgic, appropriate coordination and balance     Neuro: normal light touch sensory exam of the extremities. Motor strength as noted below     Right Knee exam    ROM:        Very mildly decreased terminal active and passive ROM with flexion and extension       Painful terminal flexion    Inspection:       no visible ecchymosis       Trace effusion by palpation    Skin:       no visible deformities       well perfused       capillary refill brisk    Patellar Motion:        Normal patellar tracking noted through range of motion       Lateral tilt noted in patella       Crepitus noted in the patellofemoral joint    Tender:        medial  patellar border       lateral patellar border       lateral joint line    Non Tender:         remainder of knee area    Special Tests:        neg (-) Lachman       neg (-) varus at 0 deg and 30 deg       neg (-) valgus at 0 deg and 30 deg      Radiology  Recent Results (from the past 744 hour(s))   XR Knee Standing AP Bilat Rafael Hernandez Bilat Lat Right    Narrative    KNEE STANDING AP BILATERAL SUNRISE BILATERAL LATERAL RIGHT 1/14/2021  10:35 AM     HISTORY: Chronic pain of right knee.       Impression    IMPRESSION:  1. Right knee 3 views: Patellofemoral osteophytosis. Mild lateral  compartment osteophytosis. Spurring at the notch. There is mild  chondrocalcinosis (CPPD). Patellofemoral osteoarthritis, including  prominent narrowing far laterally. There is mild lateral patellar  subluxation.  2. Left knee 2 views: Spurring at the notch. Mild lateral compartment  osteophytosis. Patellofemoral osteophytosis. Slight lateral patellar  subluxation. Subtle chondrocalcinosis (CPPD).         Large Joint Injection/Arthocentesis: R knee joint    Date/Time: 9/21/2021 3:50 PM  Performed by: Jorge Arias DO  Authorized by: Jorge Arias DO     Indications:  Pain and osteoarthritis  Needle Size:  21 G  Guidance: ultrasound    Approach:  Superolateral  Location:  Knee      Medications:  3 mL ropivacaine 5 MG/ML; 40 mg triamcinolone 40 MG/ML  Outcome:  Tolerated well, no immediate complications  Procedure discussed: discussed risks, benefits, and alternatives    Consent Given by:  Patient  Timeout: timeout called immediately prior to procedure    Prep: patient was prepped and draped in usual sterile fashion        Assessment:  1. Chronic pain of right knee    2. Primary osteoarthritis of both knees        Plan:  Discussed the assessment with the patient.  Follow up: prn based on clinical progress  Known knee OA, severe in PF compartment, had good relief from CSI in the past, but less so with last injection  US  guided CSI repeated today, will assess benefit over time  Consider trial of viscosupplementation in the future if good relief but pain starts to recur, reviewed again today, pre-auth submitted and approved, deferred consideration of viscosupplementation today due to upcoming trip and desire for reliable relief in the short-term  Low impact activity strategies reviewed in detail  PT available prn  Brace options reviewed  Can try Voltaren gel OTC if needed  Reviewed safe and appropriate OTC medication choices, try tylenol first  Up to 3000mg daily of tylenol is generally safe, NSAID dosing and duration limitations reviewed  Discussed nature of degenerative arthrosis of the knee.   Discussed symptom treatment with ice or heat, topical treatments, and rest if needed.   Expectations and goals of CSI reviewed  Often 2-3 days for steroid effect, and can take up to two weeks for maximum effect  We discussed modified progressive pain-free activity as tolerated  Do not overuse in first two weeks if feeling better due to concern for vulnerability while steroid is working  Supportive care reviewed  All questions were answered today  Contact us with additional questions or concerns  Signs and sx of concern reviewed      Jorge Arias DO, PARAMJIT  Primary Care Sports Medicine  [facility]               Disclaimer: This note consists of symbols derived from keyboarding, dictation and/or voice recognition software. As a result, there may be errors in the script that have gone undetected. Please consider this when interpreting information found in this chart.

## 2021-09-21 NOTE — LETTER
2021         RE: Camelia Bangura  [address] [facility] [address]        Dear Colleague,    Thank you for referring your patient, Camelia Bangura, to the [facility] [place]. Please see a copy of my visit note below.    Camelia Bangura  :  1945  DOS: 21  MRN: [id number]    Sports Medicine Clinic Visit    PCP: Chaya Mckenna    Camelia Bangura is a 75 year old female left hand dominant who is seen in consultation at the request of  Tracey Pitts M.D. presenting with acute right knee pain.    Injury: Gradual onset of right knee pain over the past 4 - 6 weeks after walking on slippery sidewalk.  Pain located over right deep anterior medial knee, nonradiating.  Additional Features:  Positive: grinding and weakness.  Symptoms are better with Rest.  Symptoms are worse with: deep knee bends, going from sit to stand, kneeling.  Other evaluation and/or treatments so far consists of: Ibuprofen and Rest.  Recent imaging completed: No recent imaging completed.  Prior History of related problems: history of similar right knee pain in 2017 that improved following steroid injection from her PCP - Dr Casas.    Second complaint of acute left thumb pain with possible contracture over the past 3 - 4 weeks.  Pain is worse with gripping/grasping and thumb flexion/opposition.  No prior treatment or imaging completed at this time.  No previous history of injury to left thumb.    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  Right knee steroid injection completed on 21 provided good relief for ~ 6 months.  Patient is traveling in ~ 10 days and would like to discuss repeat injection.  No new injury in the interim.    Review of Systems  Musculoskeletal: as above  Remainder of review of systems is  negative including constitutional, CV, pulmonary, GI, Skin and Neurologic except as noted in HPI or medical history.    Past Medical History:   Diagnosis Date     Arthritis      Contact dermatitis and other eczema, due to unspecified cause      Dysplasia of cervix, unspecified 2005     Past Surgical History:   Procedure Laterality Date     C APPENDECTOMY       HC CORRECT BUNION,SIMPLE  05/23/90    Right foot     HC REMOVAL OF TONSILS,<13 Y/O       HYSTERECTOMY, PAP NO LONGER INDICATED  4/05     HYSTERECTOMY, VAGINAL  4/05     PHACOEMULSIFICATION WITH STANDARD INTRAOCULAR LENS IMPLANT Right 6/21/2021    Procedure: Right eye Cataract Extraction with Implant;  Surgeon: Alexander Mendoza MD;  Location: [facility]     PHACOEMULSIFICATION WITH STANDARD INTRAOCULAR LENS IMPLANT Left 7/21/2021    Procedure: Cataract Extraction with Implant, left eye;  Surgeon: Alexander Mendoza MD;  Location: [facility]     SURGICAL HISTORY OF -   06/19/95    Bunion deformity & fractured tibial sesamoid left foot     Family History   Problem Relation Age of Onset     Cerebrovascular Disease Mother      Arthritis Mother      Osteoporosis Mother      Melanoma No family hx of        Objective  /71   Ht 1.524 m (5')   BMI 29.10 kg/m        General: healthy, alert and in no distress      HEENT: no scleral icterus or conjunctival erythema     Skin: no suspicious lesions or rash. No jaundice.     CV: regular rhythm by palpation, 2+ distal pulses, no pedal edema      Resp: normal respiratory effort without conversational dyspnea     Psych: normal mood and affect      Gait: nonantalgic, appropriate coordination and balance     Neuro: normal light touch sensory exam of the extremities. Motor strength as noted below     Right Knee exam    ROM:        Very mildly decreased terminal active and passive ROM with flexion and extension       Painful terminal flexion    Inspection:       no visible ecchymosis       Trace effusion by  palpation    Skin:       no visible deformities       well perfused       capillary refill brisk    Patellar Motion:        Normal patellar tracking noted through range of motion       Lateral tilt noted in patella       Crepitus noted in the patellofemoral joint    Tender:        medial patellar border       lateral patellar border       lateral joint line    Non Tender:         remainder of knee area    Special Tests:        neg (-) Lachman       neg (-) varus at 0 deg and 30 deg       neg (-) valgus at 0 deg and 30 deg      Radiology  Recent Results (from the past 744 hour(s))   XR Knee Standing AP Bilat [place] Bilat Lat Right    Narrative    KNEE STANDING AP BILATERAL SUNRISE BILATERAL LATERAL RIGHT 1/14/2021  10:35 AM     HISTORY: Chronic pain of right knee.       Impression    IMPRESSION:  1. Right knee 3 views: Patellofemoral osteophytosis. Mild lateral  compartment osteophytosis. Spurring at the notch. There is mild  chondrocalcinosis (CPPD). Patellofemoral osteoarthritis, including  prominent narrowing far laterally. There is mild lateral patellar  subluxation.  2. Left knee 2 views: Spurring at the notch. Mild lateral compartment  osteophytosis. Patellofemoral osteophytosis. Slight lateral patellar  subluxation. Subtle chondrocalcinosis (CPPD).         Large Joint Injection/Arthocentesis: R knee joint    Date/Time: 9/21/2021 3:50 PM  Performed by: Jorge Arias DO  Authorized by: Jorge Arias DO     Indications:  Pain and osteoarthritis  Needle Size:  21 G  Guidance: ultrasound    Approach:  Superolateral  Location:  Knee      Medications:  3 mL ropivacaine 5 MG/ML; 40 mg triamcinolone 40 MG/ML  Outcome:  Tolerated well, no immediate complications  Procedure discussed: discussed risks, benefits, and alternatives    Consent Given by:  Patient  Timeout: timeout called immediately prior to procedure    Prep: patient was prepped and draped in usual sterile fashion        Assessment:  1.  Chronic pain of right knee    2. Primary osteoarthritis of both knees        Plan:  Discussed the assessment with the patient.  Follow up: prn based on clinical progress  Known knee OA, severe in PF compartment, had good relief from CSI in the past, but less so with last injection  US guided CSI repeated today, will assess benefit over time  Consider trial of viscosupplementation in the future if good relief but pain starts to recur, reviewed again today, pre-auth submitted and approved, deferred consideration of viscosupplementation today due to upcoming trip and desire for reliable relief in the short-term  Low impact activity strategies reviewed in detail  PT available prn  Brace options reviewed  Can try Voltaren gel OTC if needed  Reviewed safe and appropriate OTC medication choices, try tylenol first  Up to 3000mg daily of tylenol is generally safe, NSAID dosing and duration limitations reviewed  Discussed nature of degenerative arthrosis of the knee.   Discussed symptom treatment with ice or heat, topical treatments, and rest if needed.   Expectations and goals of CSI reviewed  Often 2-3 days for steroid effect, and can take up to two weeks for maximum effect  We discussed modified progressive pain-free activity as tolerated  Do not overuse in first two weeks if feeling better due to concern for vulnerability while steroid is working  Supportive care reviewed  All questions were answered today  Contact us with additional questions or concerns  Signs and sx of concern reviewed      Jorge Arias DO, PARAMJIT  Primary Care Sports Medicine  [facility]               Disclaimer: This note consists of symbols derived from keyboarding, dictation and/or voice recognition software. As a result, there may be errors in the script that have gone undetected. Please consider this when interpreting information found in this chart.      Again, thank you for allowing me to participate in the care of your  patient.        Sincerely,        Jorge Arias, DO

## 2021-10-21 ENCOUNTER — MYC MEDICAL ADVICE (OUTPATIENT)
Dept: FAMILY MEDICINE | Facility: CLINIC | Age: 76
End: 2021-10-21

## 2021-10-21 DIAGNOSIS — E78.5 HYPERLIPIDEMIA LDL GOAL <100: Primary | ICD-10-CM

## 2021-10-21 DIAGNOSIS — E78.5 HYPERLIPIDEMIA LDL GOAL <130: ICD-10-CM

## 2021-10-22 RX ORDER — SIMVASTATIN 40 MG
40 TABLET ORAL AT BEDTIME
Qty: 90 TABLET | Refills: 3 | Status: SHIPPED | OUTPATIENT
Start: 2021-10-22 | End: 2022-10-14

## 2021-11-01 ENCOUNTER — OFFICE VISIT (OUTPATIENT)
Dept: PALLIATIVE MEDICINE | Facility: CLINIC | Age: 76
End: 2021-11-01
Payer: COMMERCIAL

## 2021-11-01 VITALS — SYSTOLIC BLOOD PRESSURE: 116 MMHG | DIASTOLIC BLOOD PRESSURE: 65 MMHG | HEART RATE: 71 BPM

## 2021-11-01 DIAGNOSIS — M54.40 CHRONIC MIDLINE LOW BACK PAIN WITH SCIATICA, SCIATICA LATERALITY UNSPECIFIED: ICD-10-CM

## 2021-11-01 DIAGNOSIS — G89.29 CHRONIC MIDLINE LOW BACK PAIN WITH SCIATICA, SCIATICA LATERALITY UNSPECIFIED: ICD-10-CM

## 2021-11-01 DIAGNOSIS — M54.16 LUMBAR RADICULOPATHY: Primary | ICD-10-CM

## 2021-11-01 PROCEDURE — 99204 OFFICE O/P NEW MOD 45 MIN: CPT | Performed by: STUDENT IN AN ORGANIZED HEALTH CARE EDUCATION/TRAINING PROGRAM

## 2021-11-01 ASSESSMENT — PAIN SCALES - GENERAL: PAINLEVEL: NO PAIN (0)

## 2021-11-01 NOTE — PATIENT INSTRUCTIONS
I think your low back pain is most likely due to an irritated nerve in your back or a small tear (fissure) in one of your lumbar discs.     For this we will:     - Start physical therapy with the intention to get a home exercise program you can do on your own.   - Take Tylenol and ibuprofen as needed.   Max doses are as follows:   Tylenol 1000mg up to three times a day with ibuprofen 800mg up to three times a day. Tylenol and ibuprofen tend to work best when used together.     Your next steps:  1. Schedule physical therapy. You will be called to schedule this    Follow up:   - in clinic as needed  - call or MyChart message me if your pain does not improve or you would like to pursue another back injection (epidural steroid injection).  Studies have been done to suggest up to 4 corticosteroid injections per year is safe for bone health    Alondra Brown MD  [employer]     ----------------------------------------------------------------  Clinic Number:  [phone]     Call with any questions about your care and for scheduling assistance.     Calls are returned Monday through Friday between 8 AM and 4:30 PM. We usually get back to you within 2 business days depending on the issue/request.    If we are prescribing your medications:    For opioid medication refills, call the clinic or send a [employer] message 7 days in advance.  Please include:    Name of requested medication    Name of the pharmacy.    For non-opioid medications, call your pharmacy directly to request a refill. Please allow 3-4 days to be processed.     Per [place] State Law:    All controlled substance prescriptions must be filled within 30 days of being written.      For those controlled substances allowing refills, pickup must occur within 30 days of last fill.      We believe regular attendance is key to your success in our program!      Any time you are unable to keep your appointment we ask that you call us at least 24 hours  in advance to cancel.This will allow us to offer the appointment time to another patient.     Multiple missed appointments may lead to dismissal from the clinic.

## 2021-11-01 NOTE — PROGRESS NOTES
Camelia Bangura  :  1945  DOS: 2021  MRN: [id number]    Medical Spine Clinic Visit    PCP: Tracey Pitts     Camelia Bangura is a 76 year old female referred by Jorge Arias for: chronic low back pain    Sees Dr. Arias for chronic R knee pain. She has been favoring her R side as a result.   8-10 yrs ago saw Dr. Darryn Casas (primary care provider ) for what she considered to be left hip pain. Had MRI and was found to have a disc bulge. epidural steroid injection resolved the pain completely  Over the past few weeks she has had pain. Had severe pain on forward flexion (tying her granddaughter's hockey skates). Then had another instance of bending forward resulting in severe pain.   She was told to take ibuprofen 600 mg three times a day, gradually decreased that dose over the past 2 weeks.   She doesn't want any pain medications other than an anti-inflammatory  Wants to know about exercises.   Pain is just left of midine at level of iliac crest. Currently bending forward for a period of time (1/2 bend leaning over work) is exacerbating.   NO radiating pain. Intermittent. No clear pattern as to what brings on the pain.   No numbness or tingling. No weakness apart from weakness due to pain.   Thinks this is the same pain that she had years ago prior to the epidural steroid injection. Can have some pain now with walking     Other evaluation and/or treatments so far consists of: Ibuprofen and stretching.      Current pain medications:   - ibuprofen    Other pertinent medications:  - no    Anticoagulants:  - no    Injections:   - 2015 left L3 Transforaminal epidural steroid injection - 100% helpful for years for what she thinks was similar pain, but at that time the pain was when she was walking    History of Surgery in the painful area:   - no    Social History: has 5 grandchilren, ages 22-3. 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 4yo granddaughter plays hocket    Past Medical  History:   Diagnosis Date     Arthritis      Contact dermatitis and other eczema, due to unspecified cause      Dysplasia of cervix, unspecified 2005     Past Surgical History:   Procedure Laterality Date     C APPENDECTOMY       HC CORRECT BUNION,SIMPLE  05/23/90    Right foot     HC REMOVAL OF TONSILS,<13 Y/O       HYSTERECTOMY, PAP NO LONGER INDICATED  4/05     HYSTERECTOMY, VAGINAL  4/05     PHACOEMULSIFICATION WITH STANDARD INTRAOCULAR LENS IMPLANT Right 6/21/2021    Procedure: Right eye Cataract Extraction with Implant;  Surgeon: Alexander Mendoza MD;  Location: [facility]     PHACOEMULSIFICATION WITH STANDARD INTRAOCULAR LENS IMPLANT Left 7/21/2021    Procedure: Cataract Extraction with Implant, left eye;  Surgeon: Alexander Mendoza MD;  Location: [facility]     SURGICAL HISTORY OF -   06/19/95    Bunion deformity & fractured tibial sesamoid left foot     Family History   Problem Relation Age of Onset     Cerebrovascular Disease Mother      Arthritis Mother      Osteoporosis Mother      Melanoma No family hx of        Objective  /65   Pulse 71       General: healthy, alert and in no distress      HEENT: no scleral icterus or conjunctival erythema     Skin: no suspicious lesions or rash. No jaundice.     CV: normal rate      Resp: normal respiratory effort without conversational dyspnea     Psych: normal mood and affect      Gait: nonantalgic, appropriate coordination and balance     Neuro: Motor strength as noted below     Low back exam:    THORACIC/LUMBAR SPINE  Inspection:    No gross deformity/asymmetry, scapular winging  Palpation:    No Tenderness to palpation about the lumbar spinous processes, lumbar facet joints, left para lumbar muscles, right para lumbar muscles, left SI joint and right SI joint. Otherwise remainder of landmarks are nontender.  Range of Motion:     Lumbar flexion show full range    Lumbar extension show full range    Right rotation show full range    Left rotation show  "full range  Strength:    able to heel walk, able to toe walk, 5/5 - quadriceps, hamstrings, tibialis anterior, gastrocsoleus, and extensor hallicus longus  Special Tests:    Positive: none   Negative: BILATERAL straight leg raise (bilateral), slump test (bilateral), femoral stretch test (bilateral), facet compression test (low lumbar bilaterally), SI joint compression (bilateral), TRAVIS (bilateral), FADIR (bilateral), Gaenslen's test    Reflexes:       patellar (L3, L4) symmetric normal       achilles tendons (S1) symmetric normal  no ankle clonus bilaterally    Sensation:      grossly intact throughout lower extremities    Skin:       well perfused       capillary refill brisk    Radiology:  MRI lumbar spine 2015  1. Multilevel degenerative disc disease with an exaggerated lumbar  lordosis. Prominent facet degenerative changes in the lower lumbar  spine.  2. At L4-L5, there is grade 1 spondylolisthesis. Mild to moderate  central stenosis. Prominent left subarticular stenosis with contact  and possible compression of the descending left L5 nerve root.  Moderate right foraminal stenosis.  3. At L5-S1, there is moderate left foraminal stenosis. See above for  details at each level.    Assessment:  1. Lumbar radiculopathy    2. Chronic midline low back pain with sciatica, sciatica laterality unspecified      Sudden severe (\"10/10\") pain with sporadic exacerbations. Midline with negative SLR and seated slump, but given sudden onset, severity of pain and history of similar pain years ago alleviated with lumbar epidural steroid injection, most likely radicular vs discogenic from annular tear. MRI at the time of previous pain episode demonstrated L4/5 grade 1 spondylolisthesis with mod central (prominent L lateral recess) stenosis.  Currently doing OK with conservative care so will continue.     Plan:  Discussed the assessment with the patient.  Physical therapy referral  Over the counter medication: Acetaminophen " (Tylenol) 1000mg every 6 hours with food (Maximum of 3000mg/day)  Ibuprofen (Advil) maximum of 800mg three times a day with food  Consider L4/5 IL epidural Steroid injection done at [facility] in the future if Pt does not improve, but would want lumbar MRI prior to injection  Follow up: As needed    BILLING TIME DOCUMENTATION:   The total TIME spent on this patient on the date of the encounter/appointment was 41 minutes.      TOTAL TIME includes:   Time spent preparing to see the patient (reviewing records and tests) - 4 min  Time spent face to face (or over the phone) with the patient - 29 min  Time spent ordering tests, medications, procedures and referrals - 0 min  Time spent Referring and communicating with other healthcare professionals - 1 min  Time spent documenting clinical information in Epic - 5 min     Alondra Brown MD  [facility]     Disclaimer: This note consists of symbols derived from keyboarding, dictation and/or voice recognition software. As a result, there may be errors in the script that have gone undetected. Please consider this when interpreting information found in this chart.

## 2021-11-04 ENCOUNTER — LAB (OUTPATIENT)
Dept: LAB | Facility: CLINIC | Age: 76
End: 2021-11-04
Payer: COMMERCIAL

## 2021-11-04 DIAGNOSIS — E78.5 HYPERLIPIDEMIA LDL GOAL <100: ICD-10-CM

## 2021-11-04 LAB
CHOLEST SERPL-MCNC: 131 MG/DL
FASTING STATUS PATIENT QL REPORTED: YES
HDLC SERPL-MCNC: 54 MG/DL
LDLC SERPL CALC-MCNC: 48 MG/DL
NONHDLC SERPL-MCNC: 77 MG/DL
TRIGL SERPL-MCNC: 145 MG/DL

## 2021-11-04 PROCEDURE — 36415 COLL VENOUS BLD VENIPUNCTURE: CPT

## 2021-11-04 PROCEDURE — 80061 LIPID PANEL: CPT

## 2021-11-04 NOTE — LETTER
November 11, 2021      Cameliasohan Bangura  [address] [place] [id number]        Dear ,    We are writing to inform you of your test results.    Your labs came back within normal limits.  Please continue with your medication and lifestyle.    Resulted Orders   Lipid panel reflex to direct LDL Fasting   Result Value Ref Range    Cholesterol 131 <200 mg/dL    Triglycerides 145 <150 mg/dL    Direct Measure HDL 54 >=50 mg/dL    LDL Cholesterol Calculated 48 <=100 mg/dL    Non HDL Cholesterol 77 <130 mg/dL    Patient Fasting > 8hrs? Yes     Narrative    Cholesterol  Desirable:  <200 mg/dL    Triglycerides  Normal:  Less than 150 mg/dL  Borderline High:  150-199 mg/dL  High:  200-499 mg/dL  Very High:  Greater than or equal to 500 mg/dL    Direct Measure HDL  Female:  Greater than or equal to 50 mg/dL   Male:  Greater than or equal to 40 mg/dL    LDL Cholesterol  Desirable:  <100mg/dL  Above Desirable:  100-129 mg/dL   Borderline High:  130-159 mg/dL   High:  160-189 mg/dL   Very High:  >= 190 mg/dL    Non HDL Cholesterol  Desirable:  130 mg/dL  Above Desirable:  130-159 mg/dL  Borderline High:  160-189 mg/dL  High:  190-219 mg/dL  Very High:  Greater than or equal to 220 mg/dL       If you have any questions or concerns, please call the clinic at the number listed above.       Sincerely,      Tracey Pitts MD

## 2021-11-15 ENCOUNTER — HOSPITAL ENCOUNTER (OUTPATIENT)
Dept: PHYSICAL THERAPY | Facility: CLINIC | Age: 76
Setting detail: THERAPIES SERIES
End: 2021-11-15
Attending: STUDENT IN AN ORGANIZED HEALTH CARE EDUCATION/TRAINING PROGRAM
Payer: COMMERCIAL

## 2021-11-15 PROCEDURE — 97162 PT EVAL MOD COMPLEX 30 MIN: CPT | Mod: GP | Performed by: PHYSICAL THERAPIST

## 2021-11-15 PROCEDURE — 97110 THERAPEUTIC EXERCISES: CPT | Mod: GP | Performed by: PHYSICAL THERAPIST

## 2021-11-15 NOTE — PROGRESS NOTES
[facility]    OUTPATIENT PHYSICAL THERAPY ORTHOPEDIC EVALUATION  PLAN OF TREATMENT FOR OUTPATIENT REHABILITATION  (COMPLETE FOR INITIAL CLAIMS ONLY)  Patient's Last Name, First Name, M.I.  YOB: 1945  Andreina Banguramarjensen PALMER    Provider s Name:  [facility]   Medical Record No.  [id number]   Start of Care Date:  11/15/21   Onset Date:  11/01/21 (MD Order date. )   Type:     _X__PT   ___OT   ___SLP Medical Diagnosis:  Lumbar Radiculopathy     PT Diagnosis:  Intermittent back pain, R Knee pain.    Visits from SOC:  1      _________________________________________________________________________________  Plan of Treatment/Functional Goals:     Develop HEP. PTRX# [id number]     Not indicated.   Goals  Goal Identifier: 1  Goal Description: STG: Pt will note being able to walk long distances for exercise, more than a mile.   Target Date: 12/27/21    Goal Identifier: 2  Goal Description: STG: Pt will report no pain w/ stairs and able to take them normally.   Target Date: 12/27/21    Goal Identifier: 3  Goal Description: LTG: Pt will be independent w/ HEP and self cares for LB and R Knee.   Target Date: 12/27/21    Therapy Frequency:  1 time/week  Predicted Duration of Therapy Intervention:  6 weeks, 4 visits.     Inez Grey, PT, [facility]                  I CERTIFY THE NEED FOR THESE SERVICES FURNISHED UNDER        THIS PLAN OF TREATMENT AND WHILE UNDER MY CARE     (Physician co-signature of this document indicates review and certification of the therapy plan).                     Certification Date From:  11/15/21   Certification Date To:  12/27/21    Referring Provider:  Eleonora Brown     Initial Assessment        See Epic Evaluation Start of Care Date: 11/15/21

## 2021-11-15 NOTE — PROGRESS NOTES
Ortho Evaluation  11/15/21 0900   General Information   Type of Visit Initial OP Ortho PT Evaluation   Start of Care Date 11/15/21   Referring Physician Eleonora Brown    Patient/Family Goals Statement Exercises to avoid back pain. Strengthen. Taking stairs differently d/t knee pain.    Orders Evaluate and Treat   Date of Order 11/01/21   Certification Required? Yes  (Jean ALCANTAR - auth'd )   Medical Diagnosis Lumbar Radiculopathy   Surgical/Medical history reviewed   (OA,Hysterec 2000, Appendect 1949, Bunionectomy B 95 and 2000)   Precautions/Limitations no known precautions/limitations   Special Instructions OTC Ibuprofen and Tylenol    General Information Comments Has a R knee that probably needs replacement, had cortisone shot Sept 21st that improved that. Going down stairs is difficult. Was referred to [facility],    Body Part(s)   Body Part(s) Lumbar Spine/SI   Presentation and Etiology   Pertinent history of current problem (include personal factors and/or comorbidities that impact the POC) Had one episode of Back pain a couple weeks ago that brought me to my knees. R Knee when 14 years old, slid into an apple tree.    Impairments A. Pain;E. Decreased flexibility   Functional Limitations perform desired leisure / sports activities;perform required work activities   Symptom Location R Knee pain and LBP.    How/Where did it occur From insidious onset   Onset date of current episode/exacerbation 11/01/21  (MD Order date. )   Chronicity Chronic   Pain quality C. Aching   Frequency of pain/symptoms B. Intermittent   Pain/symptoms are: The same all the time   Pain/symptoms exacerbated by B. Walking;M. Other   Pain exacerbation comment Stairs    Pain/symptoms eased by I. OTC medication(s)   Progression of symptoms since onset: Improved  (Knee is more irritated late.y)   Current / Previous Interventions   Diagnostic Tests: MRI   MRI Results Results   MRI results MRI in 2015 Grade I Spondylo L4/5, B Formainal Stenosis     Prior Level of Function   Functional Level Prior Comment Independent w/ADL's   Current Level of Function   Current Community Support Family/friend caregiver   Patient role/employment history A. 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    Living environment House/townhome   Home/community accessibility Stairs w/ railings.    Fall Risk Screen   Fall screen completed by PT   Have you fallen 2 or more times in the past year? No   Have you fallen and had an injury in the past year? No   Timed Up and Go score (seconds) No balance issues and walksinto dept quickly.    Is patient a fall risk? No   Abuse Screen (yes response referral indicated)   Feels Unsafe at Home or Work/School no   Feels Threatened by Someone no   Does Anyone Try to Keep You From Having Contact with Others or Doing Things Outside Your Home? no   Physical Signs of Abuse Present no   System Outcome Measures   Outcome Measures Low Back Pain (see Oswestry and Jayla)   Functional Scales   Functional Scales OPTIMAL   Optimal (1=able to do without difficulty, 2=able to do with little difficulty, 3=able to do with moderate difficulty, 4=able to do with much difficulty, 5=unable to do, 9=NA) Activity 1;Activity 2;Activity 3   Activity 1 comment Pt states she takes stairs differently d/t R Knee pain.    Activity 2 comment Unable to walk long distances.    Lumbar Spine/SI Objective Findings   Observation No acute distress.    Integumentary Normal    Posture Slight head forward, Flat thoracic back, Increased lumbar lordosis.    Gait/Locomotion Toes out sligthly on R, SLSquat R>L knee Valgus, d/t control.    Flexion ROM Normal    Extension ROM 75%    Right Side Bending ROM Normal    Left Side Bending ROM Normal    Repeated Extension-Standing ROM no change    Repeated Flexion-Standing ROM No change    Pelvic Screen Normal    Hip Screen Normal    Hip Flexion (L2) Strength Normal    Hip Abduction Strength Normal b    Knee Flexion Strength Normal     Knee Extension (L3) Strength Normal   Ankle Dorsiflexion (L4) Strength Normal    Hamstring Flexibility Normal    Hip Flexor Flexibility R Hip Flex tight   Quadricep Flexibility Tigth B: R 90, L 100 in prone w/ knee flexion.    Piriformis Flexibility L>R Tight    Lumbar/SI Flexibility Comments R Gas/Sol tighter.    SLR Negative per MD note.    Segmental Mobility Normal in sitting w/ Flex and Ext    Sensation Testing Normal    Patellar Tendon Reflexes  Normal   Achilles Tendon Reflexes  Polly;    Palpation Tender L SI.    Planned Therapy Interventions   Planned Therapy Interventions Comment Develop HEP. PTRX# [id number]   Planned Modality Interventions   Planned Modality Interventions Comments Not indicated.    Clinical Impression   Criteria for Skilled Therapeutic Interventions Met yes, treatment indicated   PT Diagnosis Intermittent back pain, R Knee pain.    Influenced by the following impairments Pain, Decreased ROM, stiffness    Functional limitations due to impairments Mobility, Walking for Exercise.    Clinical Presentation Evolving/Changing   Clinical Presentation Rationale Jayla, MARIA ESTHER, Hx of Hysterectomy, B Bunnionectomy, Flexibility, Poor hip control.    Clinical Decision Making (Complexity) Moderate complexity   Therapy Frequency 1 time/week   Predicted Duration of Therapy Intervention (days/wks) 6 weeks, 4 visits.    Risk & Benefits of therapy have been explained Yes   Patient, Family & other staff in agreement with plan of care Yes   Clinical Impression Comments Intermittent back pain, R Knee pain.    Education Assessment   Preferred Learning Style Listening;Demonstration;Pictures/video   Barriers to Learning No barriers   ORTHO GOALS   PT Ortho Eval Goals 1;2;3   Ortho Goal 1   Goal Identifier 1   Goal Description STG: Pt will note being able to walk long distances for exercise, more than a mile.    Target Date 12/27/21   Ortho Goal 2   Goal Identifier 2   Goal Description STG: Pt will report no pain w/  stairs and able to take them normally.    Target Date 12/27/21   Ortho Goal 3   Goal Identifier 3   Goal Description LTG: Pt will be independent w/ HEP and self cares for LB and R Knee.    Target Date 12/27/21   Total Evaluation Time   PT Eval, Moderate Complexity Minutes (34145) 35   Therapy Certification   Certification date from 11/15/21   Certification date to 12/27/21   Medical Diagnosis Lumbar Radiculopathy   Inez Grey, PT, [facility] (#[id number])  [facility]           [phone]  Fax          [phone]  Appt #      [phone]

## 2021-11-22 ENCOUNTER — NURSE TRIAGE (OUTPATIENT)
Dept: NURSING | Facility: CLINIC | Age: 76
End: 2021-11-22
Payer: COMMERCIAL

## 2021-11-22 ENCOUNTER — APPOINTMENT (OUTPATIENT)
Dept: URGENT CARE | Facility: CLINIC | Age: 76
End: 2021-11-22
Payer: COMMERCIAL

## 2021-11-22 NOTE — TELEPHONE ENCOUNTER
Questions about covid testing  Dwight Alston RN -[phone]    Reason for Disposition    COVID-19 Testing, questions about    Additional Information    Negative: COVID-19 lab test positive    Negative: [1] Lives with someone known to have influenza (flu test positive) AND [2] flu-like symptoms (e.g., cough, runny nose, sore throat, SOB; with or without fever)    Negative: [1] Symptoms of COVID-19 (e.g., cough, fever, SOB, or others) AND [2] HCP diagnosed COVID-19 based on symptoms    Negative: [1] Symptoms of COVID-19 (e.g., cough, fever, SOB, or others) AND [2] lives in an area with community spread    Negative: [1] Symptoms of COVID-19 (e.g., cough, fever, SOB, or others) AND [2] within 14 days of EXPOSURE (close contact) with diagnosed or suspected COVID-19 patient    Negative: [1] Symptoms of COVID-19 (e.g., cough, fever, SOB, or others) AND [2] within 14 days of travel from high-risk area for COVID-19 community spread (identified by CDC)    Negative: [1] Difficulty breathing (shortness of breath) occurs AND [2] onset > 14 days after COVID-19 EXPOSURE (Close Contact)    Negative: [1] Dry cough occurs AND [2] onset > 14 days after COVID-19 EXPOSURE    Negative: [1] Wet cough (i.e., white-yellow, yellow, green, or naresh colored sputum) AND [2] onset > 14 days after COVID-19 EXPOSURE    Negative: [1] Common cold symptoms AND [2] onset > 14 days after COVID-19 EXPOSURE    Negative: COVID-19 vaccine reaction suspected (e.g., fever, headache, muscle aches) occurring during days 1-3 after getting vaccine    Negative: COVID-19 vaccine, questions about    Negative: [1] CLOSE CONTACT COVID-19 EXPOSURE within last 14 days AND [2] needs COVID-19 lab test to return to work AND [3] NO symptoms    Negative: [1] CLOSE CONTACT COVID-19 EXPOSURE within last 14 days AND [2] exposed person is a  (e.g., police or paramedic) AND [3] NO symptoms    Negative: [1] CLOSE CONTACT COVID-19 EXPOSURE within last 14 days  AND [2] exposed person is a healthcare worker who was NOT using all recommended personal protective equipment (e.g., a respirator-N95 mask, eye protection, gloves, and gown) AND [3] NO symptoms    Negative: [1] Living or working in a correctional facility, long-term care facility, or shelter (i.e., congregate setting; densely populated) AND [2] where an outbreak has occurred AND [3] NO symptoms    Negative: [1] CLOSE CONTACT COVID-19 EXPOSURE within last 14 days AND [2] NO symptoms    Negative: [1] Has NOT completed COVID-19 vaccine series AND [2] was at a large indoor or outdoor event (e.g., concert, festival, rally, wedding) or been in crowded indoor setting AND [3] within last 14 days    Negative: [1] COVID-19 EXPOSURE AND [2] 15 or more days ago AND [3] NO symptoms    Negative: [1] Living in area with community spread (identified by local PHD) BUT [2] NO symptoms    Negative: [1] Travel from area with community spread (identified by CDC) AND [2] within last 14 days BUT [3] NO symptoms    Negative: [1] No COVID-19 EXPOSURE BUT [2] living with someone who was exposed and who has no symptoms of COVID-19    Negative: [1] Caller concerned that exposure to COVID-19 occurred BUT [2] does not meet COVID-19 EXPOSURE criteria from CDC    Protocols used: CORONAVIRUS (COVID-19) EXPOSURE-A- 8.25.2021

## 2021-11-23 ENCOUNTER — VIRTUAL VISIT (OUTPATIENT)
Dept: FAMILY MEDICINE | Facility: CLINIC | Age: 76
End: 2021-11-23
Payer: COMMERCIAL

## 2021-11-23 ENCOUNTER — LAB (OUTPATIENT)
Dept: LAB | Facility: CLINIC | Age: 76
End: 2021-11-23
Attending: FAMILY MEDICINE

## 2021-11-23 DIAGNOSIS — Z20.822 EXPOSURE TO 2019 NOVEL CORONAVIRUS: ICD-10-CM

## 2021-11-23 DIAGNOSIS — Z20.822 EXPOSURE TO 2019 NOVEL CORONAVIRUS: Primary | ICD-10-CM

## 2021-11-23 PROCEDURE — U0003 INFECTIOUS AGENT DETECTION BY NUCLEIC ACID (DNA OR RNA); SEVERE ACUTE RESPIRATORY SYNDROME CORONAVIRUS 2 (SARS-COV-2) (CORONAVIRUS DISEASE [COVID-19]), AMPLIFIED PROBE TECHNIQUE, MAKING USE OF HIGH THROUGHPUT TECHNOLOGIES AS DESCRIBED BY CMS-2020-01-R: HCPCS

## 2021-11-23 PROCEDURE — 99441 PR PHYSICIAN TELEPHONE EVALUATION 5-10 MIN: CPT | Mod: 95 | Performed by: FAMILY MEDICINE

## 2021-11-23 PROCEDURE — U0005 INFEC AGEN DETEC AMPLI PROBE: HCPCS

## 2021-11-23 NOTE — PROGRESS NOTES
Camelia is a 76 year old who is being evaluated via a billable telephone visit.      What phone number would you like to be contacted at? [phone]  How would you like to obtain your AVS? MyChart    Assessment & Plan     Exposure to 2019 novel coronavirus  Day 5-7 testing ideal which is Thursday, in the meantime mask.  - Asymptomatic COVID-19 Virus (Coronavirus) by PCR; Future       BMI:   Estimated body mass index is 29.1 kg/m  as calculated from the following:    Height as of 9/21/21: 1.524 m (5').    Weight as of 7/21/21: 67.6 kg (149 lb).     See Patient Instructions    No follow-ups on file.    Gama Hopkins MD  [facility]    Subjective   Camelia is a 76 year old who presents for the following health issues     HPI     Concern for COVID-19  About how many days ago did these symptoms start? No symptoms. Patient is fully vaccinated and has her booster.  Is this your first visit for this illness? Yes  In the 14 days before your symptoms started, have you had close contact with someone with COVID-19 (Coronavirus)? Yes, I have been in contact with someone who has COVID-19/Coronavirus (confirmed by lab test). Saturday contact with granddaughter for few hours who tested positive after an exposure at school.  Do you have a fever or chills? No  Are you having new or worsening difficulty breathing? No  Do you have new or worsening cough? No  Have you had any new or unexplained body aches? No    Have you experienced any of the following NEW symptoms?    Headache: No    Sore throat: No    Loss of taste or smell: No    Chest pain: No    Diarrhea: No    Rash: No  What treatments have you tried? Nothing. Takes tylenol and ibuprofen for arthritis.  Who do you live with?   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 No  Do you have a way to get food/medications if quarantined?  Yes, I have a friend or family member who can help me.        Review of Systems   Constitutional, HEENT, cardiovascular, pulmonary, gi and gu systems are negative, except as otherwise noted.      Objective           Vitals:  No vitals were obtained today due to virtual visit.    Physical Exam   healthy, alert and no distress  PSYCH: Alert and oriented times 3; coherent speech, normal   rate and volume, able to articulate logical thoughts, able   to abstract reason, no tangential thoughts, no hallucinations   or delusions  Her affect is normal and pleasant  RESP: No cough, no audible wheezing, able to talk in full sentences  Remainder of exam unable to be completed due to telephone visits            Phone call duration: 10 minutes

## 2021-11-23 NOTE — PROGRESS NOTES
Camelia is a 76 year old who is being evaluated via a billable video visit.      How would you like to obtain your AVS? MyChart  If the video visit is dropped, the invitation should be resent by: Text to cell phone: [phone]  Will anyone else be joining your video visit? No  {If patient encounters technical issues they should call [phone] :940370}    Video Start Time: {video visit start/end time for provider to select:830904}    {PROVIDER CHARTING PREFERENCE:311781}    Subjective   Camelia is a 76 year old who presents for the following health issues     HPI       Concern for COVID-19  About how many days ago did these symptoms start? No symptoms. Patient is fully vaccinated and has her booster.  Is this your first visit for this illness? Yes  In the 14 days before your symptoms started, have you had close contact with someone with COVID-19 (Coronavirus)? Yes, I have been in contact with someone who has COVID-19/Coronavirus (confirmed by lab test).  Do you have a fever or chills? No  Are you having new or worsening difficulty breathing? No  Do you have new or worsening cough? No  Have you had any new or unexplained body aches? No    Have you experienced any of the following NEW symptoms?    Headache: No    Sore throat: No    Loss of taste or smell: No    Chest pain: No    Diarrhea: No    Rash: No  What treatments have you tried? Nothing. Takes tylenol and ibuprofen for arthritis.  Who do you live with?   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 No  Do you have a way to get food/medications if quarantined? Yes, I have a friend or family member who can help me.    {Provider  Link to COVID SmartSet :168754}      {additonal problems for provider to add (Optional):121476}    Review of Systems   {ROS COMP (Optional):386413}      Objective           Vitals:  No vitals were obtained today due  "to virtual visit.    Physical Exam   {video visit exam brief selected:215261::\"GENERAL: Healthy, alert and no distress\",\"EYES: Eyes grossly normal to inspection.  No discharge or erythema, or obvious scleral/conjunctival abnormalities.\",\"RESP: No audible wheeze, cough, or visible cyanosis.  No visible retractions or increased work of breathing.  \",\"SKIN: Visible skin clear. No significant rash, abnormal pigmentation or lesions.\",\"NEURO: Cranial nerves grossly intact.  Mentation and speech appropriate for age.\",\"PSYCH: Mentation appears normal, affect normal/bright, judgement and insight intact, normal speech and appearance well-groomed.\"}    {Diagnostic Test Results (Optional):300492}    {AMBULATORY ATTESTATION (Optional):573982}        Video-Visit Details    Type of service:  Video Visit    Video End Time:{video visit start/end time for provider to select:484599}    Originating Location (pt. Location): {video visit patient location:418755::\"Home\"}    Distant Location (provider location):  [facility]     Platform used for Video Visit: {Virtual Visit Platforms:707945::\"[employer]\"}  "

## 2021-11-23 NOTE — PATIENT INSTRUCTIONS
Day 5-7 after exposure is the best for testing.    Instructions for Patients  It is recommended that you have a test for coronavirus (COVID-19). This illness can cause fever, cough and trouble breathing. Many people get a mild case and get better on their own. Some people can get very sick.     Please follow these steps:    1. We will call to schedule your test.  2. A member of our care team will ask you some questions. Then, they will use a swab to collect samples from your nose and throat.     Our testing team will send you your test results.    How can I protect others?    Stay home and away from others (self-isolate) until:    You ve had no fever--and no medicine that reduces fever--for 1 full day (24 hours). And      Your other symptoms have resolved (gotten better). For example, your cough or breathing has improved. And     At least 10 days have passed since your symptoms started.    Stay at least 6 feet away from others. (If someone will drive you to your test, stay in the backseat, as far away from the  as you can.)     Don t go to work, school or anywhere else. When it s time for your test, go straight to the testing site. Don t make any stops on the way there or back.     Wash your hands and face often. Use soap and water.     Cover your mouth and nose with a mask, tissue or washcloth.     Don t touch anyone. No hugging, kissing or handshakes.    How can I take care of myself?    1. Get lots of rest. Drink extra fluids (unless a doctor has told you not to).     2. Take Tylenol (acetaminophen) for fever or pain. If you have liver or kidney problems, ask your family doctor if it's okay to take Tylenol.     Adults can take either:     650 mg (two 325 mg pills) every 4 to 6 hours, or     1,000 mg (two 500 mg pills) every 8 hours as needed.     Note: Don't take more than 3,000 mg in one day.   Acetaminophen is found in many medicines (both prescribed and over-the-counter medicines). Read all labels to be  sure you don't take too much.   For children, check the Tylenol bottle for the right dose. The dose is based on  the child's age or weight.    3. If you have other health problems (like cancer, heart failure, an organ transplant or severe kidney disease): Call your specialty clinic if you don't feel better in the next 2 days.    4. Know when to call 911: If your breathing is so bad that it keeps you from doing normal activities, call 911 or go to the emergency room. Tell them that you've been staying home and may have COVID-19.      Thank you for limiting contact with others, wearing a simple mask to cover your cough, practice good hand hygiene habits and accessing our virtual services where possible to limit the spread of this virus.    For more information about COVID19 and options for caring for yourself at home, please visit the CDC website at https://www.cdc.gov/coronavirus/2019-ncov/about/steps-when-sick.html  For more options for care at [facility], please visit our website at [email or web address]

## 2021-11-24 LAB — SARS-COV-2 RNA RESP QL NAA+PROBE: NEGATIVE

## 2021-12-01 ENCOUNTER — HOSPITAL ENCOUNTER (OUTPATIENT)
Dept: MAMMOGRAPHY | Facility: CLINIC | Age: 76
Discharge: HOME | End: 2021-12-01
Attending: FAMILY MEDICINE | Admitting: FAMILY MEDICINE
Payer: COMMERCIAL

## 2021-12-01 DIAGNOSIS — Z12.31 VISIT FOR SCREENING MAMMOGRAM: ICD-10-CM

## 2021-12-01 PROCEDURE — 77063 BREAST TOMOSYNTHESIS BI: CPT

## 2021-12-06 ENCOUNTER — HOSPITAL ENCOUNTER (OUTPATIENT)
Dept: PHYSICAL THERAPY | Facility: CLINIC | Age: 76
Setting detail: THERAPIES SERIES
End: 2021-12-06
Attending: STUDENT IN AN ORGANIZED HEALTH CARE EDUCATION/TRAINING PROGRAM
Payer: COMMERCIAL

## 2021-12-06 PROCEDURE — 97110 THERAPEUTIC EXERCISES: CPT | Mod: GP | Performed by: PHYSICAL THERAPIST

## 2021-12-13 ENCOUNTER — HOSPITAL ENCOUNTER (OUTPATIENT)
Dept: OUTPATIENT PROCEDURES | Facility: CLINIC | Age: 76
Discharge: HOME | End: 2021-12-13
Attending: OPHTHALMOLOGY | Admitting: OPHTHALMOLOGY
Payer: COMMERCIAL

## 2021-12-13 PROCEDURE — 66821 AFTER CATARACT LASER SURGERY: CPT | Mod: 50 | Performed by: OPHTHALMOLOGY

## 2022-02-07 NOTE — PROGRESS NOTES
[facility] Rehabilitation [facility]    Outpatient Physical Therapy Discharge Note  Patient: Camelia Bangura  : 1945    Beginning/End Dates of Reporting Period:   to 21. Total # of Rx sessions:  2    Referring Provider: Eleonora Brown    Therapy Diagnosis: Lumbar Radiculopathy      Client Self Report: Thinks the cortisone as worn off.  Now knee pain is getting worse w/ ex's. Ex's that are irritatingthe knee is the Soleus stretch and the sitting piriformis stretch. Plans to see Ortho surgeon to have a new knee installed. Hold chart x 1 month.     Objective Measurements:  Objective Measure: Jayla   Details: 21  Score 0.  INITIALLY:  1    Objective Measure: MARIA ESTHER   Details: 21  Score 0.  INITIALLY:  4    Objective Measure: Flexibility   Details: R Hip Flex tight, Quad Right B, R 90, L 100 in prone; Pirformis L>R, R Gas/Sol tighter.     Objective Measure: Gait   Details: Toes out sligthly on R, SLSquat R>L knee Valgus, d/t control.      Goals:  Goal Identifier 1   Goal Description STG: Pt will note being able to walk long distances for exercise, more than a mile.    Target Date 21   Date Met  21   Progress (detail required for progress note):       Goal Identifier 2   Goal Description STG: Pt will report no pain w/ stairs and able to take them normally.    Target Date 21   Date Met      Progress (detail required for progress note): Still pain w/ knees on stairs.      Goal Identifier 3   Goal Description LTG: Pt will be independent w/ HEP and self cares for LB and R Knee.    Target Date 21   Date Met  21   Progress (detail required for progress note):       Plan:  Discharge from therapy.    Discharge:    Reason for Discharge: Patient chooses to discontinue therapy.  Patient has failed to schedule further appointments.    Equipment Issued:      Discharge Plan: Patient to continue  home program.  Pt to follow up w/MD as appropriate.   Inez Grey, PT, [id number]  [facility]           [phone]  Fax          [phone]  Appt #      [phone]

## 2022-02-09 ENCOUNTER — OFFICE VISIT (OUTPATIENT)
Dept: FAMILY MEDICINE | Facility: CLINIC | Age: 77
End: 2022-02-09
Payer: COMMERCIAL

## 2022-02-09 VITALS
SYSTOLIC BLOOD PRESSURE: 110 MMHG | OXYGEN SATURATION: 100 % | BODY MASS INDEX: 29.25 KG/M2 | DIASTOLIC BLOOD PRESSURE: 70 MMHG | TEMPERATURE: 97.4 F | WEIGHT: 149 LBS | HEIGHT: 60 IN | RESPIRATION RATE: 16 BRPM | HEART RATE: 110 BPM

## 2022-02-09 DIAGNOSIS — F32.5 MAJOR DEPRESSIVE DISORDER IN REMISSION, UNSPECIFIED WHETHER RECURRENT (H): Primary | ICD-10-CM

## 2022-02-09 DIAGNOSIS — Z12.12 SCREENING FOR MALIGNANT NEOPLASM OF THE RECTUM: ICD-10-CM

## 2022-02-09 DIAGNOSIS — H61.23 BILATERAL IMPACTED CERUMEN: ICD-10-CM

## 2022-02-09 PROCEDURE — 99212 OFFICE O/P EST SF 10 MIN: CPT | Mod: 25 | Performed by: FAMILY MEDICINE

## 2022-02-09 PROCEDURE — 69209 REMOVE IMPACTED EAR WAX UNI: CPT | Mod: 50 | Performed by: FAMILY MEDICINE

## 2022-02-09 ASSESSMENT — ENCOUNTER SYMPTOMS
CARDIOVASCULAR NEGATIVE: 1
RESPIRATORY NEGATIVE: 1
MUSCULOSKELETAL NEGATIVE: 1
PSYCHIATRIC NEGATIVE: 1
CONSTITUTIONAL NEGATIVE: 1
EYES NEGATIVE: 1
ENDOCRINE NEGATIVE: 1
NEUROLOGICAL NEGATIVE: 1
HEMATOLOGIC/LYMPHATIC NEGATIVE: 1
GASTROINTESTINAL NEGATIVE: 1
ALLERGIC/IMMUNOLOGIC NEGATIVE: 1

## 2022-02-09 ASSESSMENT — MIFFLIN-ST. JEOR: SCORE: 1097.36

## 2022-02-09 ASSESSMENT — PAIN SCALES - GENERAL: PAINLEVEL: NO PAIN (0)

## 2022-02-09 NOTE — PROGRESS NOTES
Assessment & Plan     Major depressive disorder in remission, unspecified whether recurrent (H)  Stable     Screening for malignant neoplasm of the rectum  .Patient will be notified of results  - Fecal colorectal cancer screen (FIT)    Bilateral impacted cerumen  Ear wax removed with irrigation           FUTURE APPOINTMENTS:       - Follow-up visit in one month or sooner as needed.    Return in about 4 weeks (around 3/9/2022).    Tracey Pitts MD  [facility]    Loni Denise is a 76 year old who presents for the following health issues accompanied by her self.    HPI     76 yr old female here for clogged ears. She has had this for over a month. She says her hearing has been muffled. She has been using debrox for about a month and she says that her ears finally popped the other day. She reports no pain, she has had no URI symptoms.     Chief Complaint   Patient presents with     Ear Problem     Check of her ears.  She has been using Debrox ear drops for about one month.  The right ear was very plugged where she couldn't hear from that side for 2 weeks.  Itching for the left ear.  This finally cleared on Sunday.  Just wanting to have the ears checked today and if she should continue using the Debrox.     Health Maintenance     Patient will do the FIT test.           Review of Systems   Constitutional: Negative.    HENT: Positive for ear pain.    Eyes: Negative.    Respiratory: Negative.    Cardiovascular: Negative.    Gastrointestinal: Negative.    Endocrine: Negative.    Breasts:  negative.    Genitourinary: Negative.    Musculoskeletal: Negative.    Skin: Negative.    Allergic/Immunologic: Negative.    Neurological: Negative.    Hematological: Negative.    Psychiatric/Behavioral: Negative.             Objective    /70   Pulse 110   Temp 97.4  F (36.3  C) (Tympanic)   Resp 16   Ht 1.524 m (5')   Wt 67.6 kg (149 lb)   SpO2 100%   BMI 29.10 kg/m    Body mass  index is 29.1 kg/m .  Physical Exam  Constitutional:       Appearance: Normal appearance.   HENT:      Head: Normocephalic and atraumatic.      Right Ear: There is impacted cerumen.      Left Ear: There is impacted cerumen.   Cardiovascular:      Rate and Rhythm: Normal rate and regular rhythm.      Pulses: Normal pulses.   Neurological:      Mental Status: She is alert and oriented to person, place, and time.   Psychiatric:         Mood and Affect: Mood normal.         Behavior: Behavior normal.

## 2022-02-09 NOTE — PATIENT INSTRUCTIONS
Thank you for choosing [facility].  You may be receiving an email and/or telephone survey request from [facility] [employer] regarding your visit today.  Please take a few minutes to respond to the survey to let us know how we are doing.      If you have questions or concerns, please contact us via [employer] or you can contact your care team at [phone] option 2.    Our Clinic hours are:  Monday - Thursday 7am-6pm  Friday 7am-5pm    The [place] outpatient lab hours are:  Monday - Friday 7am-4:30pm    Appointments are required, call [phone]    If you have clinical questions after hours or would like to schedule an appointment,  call the clinic at [phone].

## 2022-02-27 ENCOUNTER — HEALTH MAINTENANCE LETTER (OUTPATIENT)
Age: 77
End: 2022-02-27

## 2022-03-04 ENCOUNTER — LAB (OUTPATIENT)
Dept: LAB | Facility: CLINIC | Age: 77
End: 2022-03-04
Payer: COMMERCIAL

## 2022-03-04 DIAGNOSIS — Z12.12 SCREENING FOR MALIGNANT NEOPLASM OF THE RECTUM: ICD-10-CM

## 2022-03-04 PROCEDURE — 82274 ASSAY TEST FOR BLOOD FECAL: CPT

## 2022-03-05 LAB — HEMOCCULT STL QL IA: NEGATIVE

## 2022-05-02 ENCOUNTER — IMMUNIZATION (OUTPATIENT)
Dept: FAMILY MEDICINE | Facility: CLINIC | Age: 77
End: 2022-05-02
Payer: COMMERCIAL

## 2022-05-02 PROCEDURE — 0054A COVID-19,PF,PFIZER (12+ YRS): CPT

## 2022-05-02 PROCEDURE — 91305 COVID-19,PF,PFIZER (12+ YRS): CPT

## 2022-07-13 DIAGNOSIS — N95.1 SYMPTOMATIC MENOPAUSAL OR FEMALE CLIMACTERIC STATES: ICD-10-CM

## 2022-07-13 RX ORDER — ESTRADIOL 0.04 MG/D
1 PATCH TRANSDERMAL WEEKLY
Qty: 12 PATCH | Refills: 3 | Status: SHIPPED | OUTPATIENT
Start: 2022-07-13 | End: 2022-10-17

## 2022-07-13 NOTE — TELEPHONE ENCOUNTER
"Last Written Prescription Date:  04/19/21  Last Fill Quantity: 12,  # refills: 3   Last office visit: Visit date not found with prescribing provider:  4/6/2020 virtual   Future Office Visit: 11/2009 last office visit      Requested Prescriptions   Pending Prescriptions Disp Refills     estradiol (CLIMARA) 0.0375 MG/24HR weekly patch 12 patch 3     Sig: Place 1 patch onto the skin once a week       Hormone Replacement Therapy Failed - 7/13/2022 10:46 AM        Failed - Recent (12 mo) or future (30 days) visit within the authorizing provider's specialty     Patient has had an office visit with the authorizing provider or a provider within the authorizing providers department within the previous 12 mos or has a future within next 30 days. See \"Patient Info\" tab in inbasket, or \"Choose Columns\" in Meds & Orders section of the refill encounter.              Passed - Blood pressure under 140/90 in past 12 months     BP Readings from Last 3 Encounters:   02/09/22 110/70   11/01/21 116/65   09/21/21 137/71                 Passed - Patient has mammogram in past 2 years on file if age 50-75        Passed - Medication is active on med list        Passed - Patient is 18 years of age or older        Passed - No active pregnancy on record        Passed - No positive pregnancy test on record in past 12 months           Routing refill request to provider for review/approval because:  Patient needs to be seen because it has been more than 1 year since last office visit.    Shiela Pineda   [facility]  RN          "

## 2022-09-15 ENCOUNTER — TRANSFERRED RECORDS (OUTPATIENT)
Dept: FAMILY MEDICINE | Facility: CLINIC | Age: 77
End: 2022-09-15

## 2022-09-19 ENCOUNTER — TELEPHONE (OUTPATIENT)
Dept: FAMILY MEDICINE | Facility: CLINIC | Age: 77
End: 2022-09-19

## 2022-09-19 DIAGNOSIS — E78.5 HYPERLIPIDEMIA LDL GOAL <130: Primary | ICD-10-CM

## 2022-09-19 NOTE — TELEPHONE ENCOUNTER
Reason for Call:  Other appointment    Detailed comments: Pt would like to come in for lab work before she see's Dr. Pitts for her med check on 10/17.  Pt would like to be notified when the orders are placed so she can get on the schedule at the lab.    Phone Number Patient can be reached at: Cell number on file:    Telephone Information:   Mobile [phone]       Best Time: anytime    Can we leave a detailed message on this number? YES    Call taken on 9/19/2022 at 9:48 AM by Ginger Ramirez

## 2022-09-21 NOTE — TELEPHONE ENCOUNTER
DR Pitts,    Please see telephone note & advise.  I have pended a few labs for your consideration.     Leanne Edwards RN

## 2022-09-30 ENCOUNTER — NURSE TRIAGE (OUTPATIENT)
Dept: NURSING | Facility: CLINIC | Age: 77
End: 2022-09-30

## 2022-09-30 ENCOUNTER — IMMUNIZATION (OUTPATIENT)
Dept: FAMILY MEDICINE | Facility: CLINIC | Age: 77
End: 2022-09-30
Payer: COMMERCIAL

## 2022-09-30 PROCEDURE — 90662 IIV NO PRSV INCREASED AG IM: CPT

## 2022-09-30 PROCEDURE — G0008 ADMIN INFLUENZA VIRUS VAC: HCPCS | Mod: 59

## 2022-09-30 PROCEDURE — 0124A COVID-19,PF,PFIZER BOOSTER BIVALENT: CPT

## 2022-09-30 PROCEDURE — 91312 COVID-19,PF,PFIZER BOOSTER BIVALENT: CPT

## 2022-09-30 NOTE — TELEPHONE ENCOUNTER
Nurse Triage SBAR    Is this a 2nd Level Triage? No    Situation/Background: Patient is calling with question of Covid booster and flu shot. Patient was advised that [facility] will schedule the appointment for both vaccinations. Patient was transferred back to scheduling.     Recommendation: Per disposition, Information provided. Advised patient to call back with any new or worsening symptoms. Patient verbalized understanding and agrees with plan.    Protocol Recommended Disposition: Telephone advice    Mai Ulloa RN on 9/30/2022 at 9:52 AM  [facility]    Reason for Disposition    Health Information question, no triage required and triager able to answer question    Protocols used: INFORMATION ONLY CALL - NO TRIAGE-A-

## 2022-10-10 ENCOUNTER — LAB (OUTPATIENT)
Dept: LAB | Facility: CLINIC | Age: 77
End: 2022-10-10
Payer: COMMERCIAL

## 2022-10-10 DIAGNOSIS — E78.5 HYPERLIPIDEMIA LDL GOAL <130: ICD-10-CM

## 2022-10-10 LAB
ANION GAP SERPL CALCULATED.3IONS-SCNC: 18 MMOL/L (ref 7–15)
BUN SERPL-MCNC: 13.9 MG/DL (ref 8–23)
CALCIUM SERPL-MCNC: 9.6 MG/DL (ref 8.8–10.2)
CHLORIDE SERPL-SCNC: 100 MMOL/L (ref 98–107)
CHOLEST SERPL-MCNC: 162 MG/DL
CREAT SERPL-MCNC: 0.78 MG/DL (ref 0.51–0.95)
DEPRECATED HCO3 PLAS-SCNC: 21 MMOL/L (ref 22–29)
GFR SERPL CREATININE-BSD FRML MDRD: 79 ML/MIN/1.73M2
GLUCOSE SERPL-MCNC: 99 MG/DL (ref 70–99)
HDLC SERPL-MCNC: 60 MG/DL
LDLC SERPL CALC-MCNC: 75 MG/DL
NONHDLC SERPL-MCNC: 102 MG/DL
POTASSIUM SERPL-SCNC: 4.5 MMOL/L (ref 3.4–5.3)
SODIUM SERPL-SCNC: 139 MMOL/L (ref 136–145)
TRIGL SERPL-MCNC: 136 MG/DL

## 2022-10-10 PROCEDURE — 80061 LIPID PANEL: CPT

## 2022-10-10 PROCEDURE — 36415 COLL VENOUS BLD VENIPUNCTURE: CPT

## 2022-10-10 PROCEDURE — 80048 BASIC METABOLIC PNL TOTAL CA: CPT

## 2022-10-11 NOTE — RESULT ENCOUNTER NOTE
Please inform patient that test result was within normal parameters except the carbon dioxide was a bit low usually sign of dehydration. Recommend increase in fluids..   Thank you.     Tracey Pitts M.D.

## 2022-10-13 DIAGNOSIS — E78.5 HYPERLIPIDEMIA LDL GOAL <130: ICD-10-CM

## 2022-10-14 RX ORDER — SIMVASTATIN 40 MG
40 TABLET ORAL AT BEDTIME
Qty: 90 TABLET | Refills: 0 | Status: SHIPPED | OUTPATIENT
Start: 2022-10-14 | End: 2022-10-17

## 2022-10-17 ENCOUNTER — OFFICE VISIT (OUTPATIENT)
Dept: FAMILY MEDICINE | Facility: CLINIC | Age: 77
End: 2022-10-17
Payer: COMMERCIAL

## 2022-10-17 VITALS
HEIGHT: 60 IN | BODY MASS INDEX: 29.1 KG/M2 | DIASTOLIC BLOOD PRESSURE: 88 MMHG | HEART RATE: 78 BPM | SYSTOLIC BLOOD PRESSURE: 126 MMHG | OXYGEN SATURATION: 98 % | TEMPERATURE: 97 F | RESPIRATION RATE: 16 BRPM

## 2022-10-17 DIAGNOSIS — E78.5 HYPERLIPIDEMIA LDL GOAL <130: Primary | ICD-10-CM

## 2022-10-17 DIAGNOSIS — G47.00 INSOMNIA, UNSPECIFIED TYPE: ICD-10-CM

## 2022-10-17 PROCEDURE — 99214 OFFICE O/P EST MOD 30 MIN: CPT | Performed by: FAMILY MEDICINE

## 2022-10-17 RX ORDER — TRAZODONE HYDROCHLORIDE 50 MG/1
50 TABLET, FILM COATED ORAL AT BEDTIME
Qty: 30 TABLET | Refills: 0 | Status: SHIPPED | OUTPATIENT
Start: 2022-10-17 | End: 2022-12-14

## 2022-10-17 RX ORDER — SIMVASTATIN 40 MG
40 TABLET ORAL AT BEDTIME
Qty: 90 TABLET | Refills: 3 | Status: SHIPPED | OUTPATIENT
Start: 2022-10-17 | End: 2023-07-26

## 2022-10-17 ASSESSMENT — ENCOUNTER SYMPTOMS
HEMATOLOGIC/LYMPHATIC NEGATIVE: 1
EYES NEGATIVE: 1
MUSCULOSKELETAL NEGATIVE: 1
ALLERGIC/IMMUNOLOGIC NEGATIVE: 1
NEUROLOGICAL NEGATIVE: 1
PSYCHIATRIC NEGATIVE: 1
RESPIRATORY NEGATIVE: 1
CONSTITUTIONAL NEGATIVE: 1

## 2022-10-17 ASSESSMENT — PAIN SCALES - GENERAL: PAINLEVEL: NO PAIN (0)

## 2022-10-17 ASSESSMENT — PATIENT HEALTH QUESTIONNAIRE - PHQ9: SUM OF ALL RESPONSES TO PHQ QUESTIONS 1-9: 1

## 2022-10-17 NOTE — PROGRESS NOTES
Assessment & Plan     Hyperlipidemia LDL goal <130  Medication faxed.   - simvastatin (ZOCOR) 40 MG tablet; Take 1 tablet (40 mg) by mouth At Bedtime    Insomnia, unspecified type  Had a long discussion with patient. She actually wanted refills on estradiol patches  Did not feel comfortable prescribing this given all the side effects and risks.  Trial of trazodone.  - traZODone (DESYREL) 50 MG tablet; Take 1 tablet (50 mg) by mouth At Bedtime        FUTURE APPOINTMENTS:       - Follow-up visit in one month     Return in about 4 weeks (around 11/14/2022) for Follow up.    Tracey Pitts MD  [facility]    Loni Denise is a 77 year old, presenting for the following health issues:  Lipids and Recheck Medication      HPI   Patient  is a 77-year-old here for recheck of her cholesterol medicine. She  needs refills.      She is also on estradiol patches which was prescribed by her OB/GYN who has since retired.  I discussed at length with the patient I do not feel comfortable prescribing the estradiol she says she has maybe 3-4 hot flashes overnight and it seems to disrupts her sleep.      Recommended that I would give her a sleep aid. She was quite reluctant to take the trazodone but she says she will try it.  I discussed the risks of hormone replacements with her including  DVTs and cancer.  She is aware of this.  If it does not help then we will think of something else to help with her hot flashes.  I did recommend another revisit with OB/GYN.  Hyperlipidemia Follow-Up      Are you regularly taking any medication or supplement to lower your cholesterol?   Yes- simvastatin     Are you having muscle aches or other side effects that you think could be caused by your cholesterol lowering medication?  No      Medication Followup of  Estradiol     Taking Medication as prescribed: ran out of medication, OBGYN retired, now having sleep issues and hot flashes since out of the patch      Side Effects:  None    Medication Helping Symptoms:  yes      Review of Systems   Constitutional: Negative.    HENT: Negative.    Eyes: Negative.    Respiratory: Negative.    Endocrine: Positive for heat intolerance.   Breasts:  negative.    Genitourinary: Negative.    Musculoskeletal: Negative.    Skin: Negative.    Allergic/Immunologic: Negative.    Neurological: Negative.    Hematological: Negative.    Psychiatric/Behavioral: Negative.          Objective    /88   Pulse 78   Temp 97  F (36.1  C) (Tympanic)   Resp 16   Ht 1.524 m (5')   SpO2 98%   BMI 29.10 kg/m    Body mass index is 29.1 kg/m .  Physical Exam   GENERAL: healthy, alert and no distress  EYES: Eyes grossly normal to inspection, PERRL and conjunctivae and sclerae normal  HENT: ear canals and TM's normal, nose and mouth without ulcers or lesions  NECK: no adenopathy, no asymmetry, masses, or scars and thyroid normal to palpation  RESP: lungs clear to auscultation - no rales, rhonchi or wheezes  CV: regular rate and rhythm, normal S1 S2, no S3 or S4, no murmur, click or rub, no peripheral edema and peripheral pulses strong  ABDOMEN: soft, nontender, no hepatosplenomegaly, no masses and bowel sounds normal  MS: no gross musculoskeletal defects noted, no edema    No results found for this or any previous visit (from the past 24 hour(s)).

## 2022-10-24 ENCOUNTER — ALLIED HEALTH/NURSE VISIT (OUTPATIENT)
Dept: FAMILY MEDICINE | Facility: CLINIC | Age: 77
End: 2022-10-24
Payer: COMMERCIAL

## 2022-10-24 DIAGNOSIS — H61.23 BILATERAL IMPACTED CERUMEN: Primary | ICD-10-CM

## 2022-10-24 PROCEDURE — 99207 PR NO CHARGE NURSE ONLY: CPT

## 2022-10-24 NOTE — PROGRESS NOTES
Patient identified using two patient identifiers.  Ear exam showing wax occlusion completed by RN.  Solution: warm water was placed in the bilateral ear(s) via irrigation tool: elephant ear.  Grecia Fonseca CMA

## 2022-12-13 ENCOUNTER — MYC MEDICAL ADVICE (OUTPATIENT)
Dept: FAMILY MEDICINE | Facility: CLINIC | Age: 77
End: 2022-12-13

## 2022-12-13 DIAGNOSIS — G47.00 INSOMNIA, UNSPECIFIED TYPE: ICD-10-CM

## 2022-12-14 RX ORDER — TRAZODONE HYDROCHLORIDE 50 MG/1
50 TABLET, FILM COATED ORAL AT BEDTIME
Qty: 30 TABLET | Refills: 11 | Status: SHIPPED | OUTPATIENT
Start: 2022-12-14 | End: 2023-02-08

## 2022-12-16 ENCOUNTER — TELEPHONE (OUTPATIENT)
Dept: FAMILY MEDICINE | Facility: CLINIC | Age: 77
End: 2022-12-16

## 2022-12-21 ENCOUNTER — ALLIED HEALTH/NURSE VISIT (OUTPATIENT)
Dept: FAMILY MEDICINE | Facility: CLINIC | Age: 77
End: 2022-12-21
Payer: COMMERCIAL

## 2022-12-21 ENCOUNTER — TELEPHONE (OUTPATIENT)
Dept: FAMILY MEDICINE | Facility: CLINIC | Age: 77
End: 2022-12-21

## 2022-12-21 DIAGNOSIS — H61.23 BILATERAL IMPACTED CERUMEN: Primary | ICD-10-CM

## 2022-12-21 PROCEDURE — 99207 PR NO CHARGE NURSE ONLY: CPT

## 2022-12-21 NOTE — TELEPHONE ENCOUNTER
Patient was here on [facility] schedule  today for ear wax removal. Patient identified using two patient identifiers.  Ear exam showing wax occlusion completed by Kassandra MERCADO RN.  Solution: warm water was placed in the bilateral ear(s) via syringe.  Both Ears were Flushed , RN re checked ears and wax was cleared.     Patient was asking why she keeps getting this . She has been using debrox a couple times per week and bulb syringe to remove wax at home. She is wondering what else is recommended and if she should see a ENT for a consult as to why this is occurring . I will send a phone note to Dr. Pitts to advise.   Andres TRUONG [facility]

## 2022-12-21 NOTE — PROGRESS NOTES
Patient identified using two patient identifiers.  Ear exam showing wax occlusion completed by Kassandra MERCADO RN.  Solution: warm water was placed in the bilateral ear(s) via syringe.  Both Ears were Flushed , RN re checked ears and wax was cleared. Patient was asking why she keeps getting this . She has been using debrox a couple times per week and bulb syringe to remove wax at home. She is wondering what else is recommended and if she should see a ENT for a consult as to why this is occurring . I will send a phone note to Dr. Pitts to advise.   Andres TRUONG CMA

## 2022-12-27 NOTE — TELEPHONE ENCOUNTER
Wax build up is like waste matter in the ear, unless she is having pain I do not see why she needs to see ENT

## 2023-01-02 ENCOUNTER — HOSPITAL ENCOUNTER (OUTPATIENT)
Dept: MAMMOGRAPHY | Facility: CLINIC | Age: 78
Discharge: HOME | End: 2023-01-02
Attending: FAMILY MEDICINE | Admitting: FAMILY MEDICINE
Payer: COMMERCIAL

## 2023-01-02 DIAGNOSIS — Z12.31 VISIT FOR SCREENING MAMMOGRAM: ICD-10-CM

## 2023-01-02 PROCEDURE — 77067 SCR MAMMO BI INCL CAD: CPT

## 2023-01-19 ENCOUNTER — TRANSFERRED RECORDS (OUTPATIENT)
Dept: HEALTH INFORMATION MANAGEMENT | Facility: CLINIC | Age: 78
End: 2023-01-19
Payer: COMMERCIAL

## 2023-01-30 ENCOUNTER — TRANSCRIBE ORDERS (OUTPATIENT)
Dept: OTHER | Age: 78
End: 2023-01-30

## 2023-01-30 DIAGNOSIS — Z96.651 S/P TOTAL KNEE ARTHROPLASTY, RIGHT: Primary | ICD-10-CM

## 2023-01-31 ENCOUNTER — OFFICE VISIT (OUTPATIENT)
Dept: OBGYN | Facility: CLINIC | Age: 78
End: 2023-01-31
Payer: COMMERCIAL

## 2023-01-31 VITALS
HEIGHT: 60 IN | DIASTOLIC BLOOD PRESSURE: 61 MMHG | BODY MASS INDEX: 29.1 KG/M2 | SYSTOLIC BLOOD PRESSURE: 111 MMHG | HEART RATE: 83 BPM | TEMPERATURE: 97.4 F | RESPIRATION RATE: 16 BRPM

## 2023-01-31 DIAGNOSIS — N95.1 VAGINAL DRYNESS, MENOPAUSAL: Primary | ICD-10-CM

## 2023-01-31 PROCEDURE — 99213 OFFICE O/P EST LOW 20 MIN: CPT | Performed by: OBSTETRICS & GYNECOLOGY

## 2023-01-31 RX ORDER — ESTRADIOL 0.5 MG/1
TABLET ORAL
Qty: 30 TABLET | Refills: 3 | Status: SHIPPED | OUTPATIENT
Start: 2023-01-31 | End: 2023-09-08

## 2023-01-31 NOTE — PROGRESS NOTES
Chief Complaint   Patient presents with     Consult     Menopause symptoms- hot flashes and unable to sleep. Possible prolapse         HPI:  Camelia Bangura, 77 year old,  presents with complaints of hot flashes and questionable prolapse.  She states that years ago she had a hysterectomy and was started on an estrogen patch.  She continues to use this patch.  She has overtime lowered the dose of her patch.  She has tried getting off of it but she could not sleep due to hot flashes waking her up every 1-2 hours and was very sleep deprived.  She went to her primary care this year and they refused to fill it and gave her trazodone for sleep.  The first night she took the trazodone she almost could not make it to bed on time.  She has been taking half a trazodone and that is not quite helping.  She exceeded tends to have breakthrough problems and still waking up.  She does not particularly like the idea of being on an SSRI.  She states she does not have any anxiety or depression.  She still feels like she is having hot flashes they are more in the evening and they those are not as bothersome.  But she still is waking up at night with hot flashes.    She also feels like her bladder has prolapsed a little.  Its not all the time.  She is not having any pain leaking or problems with urination.  She just notices that when she wipes she can some feel something in the midline.  Otherwise it is not bothering her at all.  She is very minimally sexually active.  She is not bothered with intercourse.  She is wondering if there is exercises to help with the bladder prolapse.      Past Medical History:   Diagnosis Date     Arthritis      Contact dermatitis and other eczema, due to unspecified cause      Dysplasia of cervix, unspecified      Past Surgical History:   Procedure Laterality Date     HC CORRECT BUNION,SIMPLE  1990    Right foot     HC REMOVAL OF TONSILS,<13 Y/O       HYSTERECTOMY, PAP NO LONGER  INDICATED  04/2005     HYSTERECTOMY, VAGINAL  04/2005     PHACOEMULSIFICATION WITH STANDARD INTRAOCULAR LENS IMPLANT Right 06/21/2021    Procedure: Right eye Cataract Extraction with Implant;  Surgeon: Alexander Mendoza MD;  Location: [facility]     PHACOEMULSIFICATION WITH STANDARD INTRAOCULAR LENS IMPLANT Left 07/21/2021    Procedure: Cataract Extraction with Implant, left eye;  Surgeon: Alexander Mendoza MD;  Location: [facility]     SURGICAL HISTORY OF -   06/19/1995    Bunion deformity & fractured tibial sesamoid left foot     ZZC APPENDECTOMY       Social History     Socioeconomic History     Marital status:      Spouse name: Not on file     Number of children: Not on file     Years of education: Not on file     Highest education level: Not on file   Occupational History     Not on file   Tobacco Use     Smoking status: Never     Smokeless tobacco: Never   Vaping Use     Vaping Use: Never used   Substance and Sexual Activity     Alcohol use: Yes     Comment: occasional wine     Drug use: No     Sexual activity: Yes     Partners: Male     Birth control/protection: Surgical     Comment: hysterectomy   Other Topics Concern     Parent/sibling w/ CABG, MI or angioplasty before 65F 55M? No   Social History Narrative     Not on file     Social Determinants of Health     Financial Resource Strain: Not on file   Food Insecurity: Not on file   Transportation Needs: Not on file   Physical Activity: Not on file   Stress: Not on file   Social Connections: Not on file   Intimate Partner Violence: Not on file   Housing Stability: Not on file     Family History   Problem Relation Age of Onset     Cerebrovascular Disease Mother      Arthritis Mother      Osteoporosis Mother      Melanoma No family hx of         Current Outpatient Medications   Medication Sig Dispense Refill     estradiol (ESTRACE) 0.5 MG tablet Place 1 tablet in the vagina, before bed. 30 tablet 3     Probiotic Product (PROBIOTIC PO)         simvastatin (ZOCOR) 40 MG tablet Take 1 tablet (40 mg) by mouth At Bedtime 90 tablet 3     traZODone (DESYREL) 50 MG tablet Take 1 tablet (50 mg) by mouth At Bedtime (Patient taking differently: Take 25 mg by mouth At Bedtime) 30 tablet 11     triamcinolone (KENALOG) 0.1 % external cream Apply  topically 2 times daily. (Patient not taking: Reported on 1/31/2023) 30 g 11     valACYclovir (VALTREX) 1000 mg tablet Take 1 tablet (1,000 mg) by mouth 2 times daily for 10 days 20 tablet 0        Allergies:     Allergies   Allergen Reactions     Seasonal Allergies         ROS:  See HPI      Physical Exam:  /61 (BP Location: Right arm, Patient Position: Chair, Cuff Size: Adult Regular)   Pulse 83   Temp 97.4  F (36.3  C) (Tympanic)   Resp 16   Ht 1.524 m (5')   BMI 29.10 kg/m       Appearance: well developed, well nourished, no acute distress  Head Exam normocephalic, no lesions or deformities  Psych: orientated x3    Genitourinary Exam  declined    Assessment/Plan:  Encounter Diagnosis   Name Primary?     Vaginal dryness, menopausal Yes       We discussed hot flashes and risks and benefits of hormone replacement therapy we discussed trying to keep her on the lowest dose effective for preventing hot flashes and trying to eventually wean her off.  We discussed risks of breast cancer blood clots and strokes.  We also discussed that it is likely that she is having rebound hot flashes from recently stopping hormone therapy.  We discussed that there is no 1 way to try weaning or stopping hormones.  At this point we discussed trying to get her on even lower dose estrogen.  I gave her a prescription for 0.5 mg of estradiol.  She will try cutting this in half and using a half a tablet in the vagina before bed.  Her hot flashes are mostly at night and waking her up.  Advised that if she needed to use the full dose to help her sleep she can wean up or titrate down every 2 to 3 weeks as tolerated.  Our eventual goal would be  to get her off of estrogen but at this point as it is affecting her quality of life she would like to continue hormones for now.    We discussed prolapse and Kegel exercises.  I gave her pamphlets on this.  She declined a pelvic exam as she did not feel this was bad enough to warrant treatment at this time.          Becky Healy MD on 1/31/2023 at 9:57 AM

## 2023-01-31 NOTE — NURSING NOTE
Initial /61 (BP Location: Right arm, Patient Position: Chair, Cuff Size: Adult Regular)   Pulse 83   Temp 97.4  F (36.3  C) (Tympanic)   Resp 16   Ht 1.524 m (5')   BMI 29.10 kg/m   Estimated body mass index is 29.1 kg/m  as calculated from the following:    Height as of this encounter: 1.524 m (5').    Weight as of 2/9/22: 67.6 kg (149 lb). .    Thea Garcia, CMA

## 2023-02-08 ENCOUNTER — OFFICE VISIT (OUTPATIENT)
Dept: FAMILY MEDICINE | Facility: CLINIC | Age: 78
End: 2023-02-08
Payer: COMMERCIAL

## 2023-02-08 VITALS
BODY MASS INDEX: 30.04 KG/M2 | OXYGEN SATURATION: 98 % | WEIGHT: 149 LBS | DIASTOLIC BLOOD PRESSURE: 62 MMHG | SYSTOLIC BLOOD PRESSURE: 116 MMHG | TEMPERATURE: 98.5 F | HEART RATE: 82 BPM | HEIGHT: 59 IN | RESPIRATION RATE: 16 BRPM

## 2023-02-08 DIAGNOSIS — M17.11 ARTHRITIS OF RIGHT KNEE: ICD-10-CM

## 2023-02-08 DIAGNOSIS — Z01.818 PREOP GENERAL PHYSICAL EXAM: Primary | ICD-10-CM

## 2023-02-08 DIAGNOSIS — F32.5 MAJOR DEPRESSIVE DISORDER IN REMISSION, UNSPECIFIED WHETHER RECURRENT (H): ICD-10-CM

## 2023-02-08 DIAGNOSIS — G47.00 INSOMNIA, UNSPECIFIED TYPE: ICD-10-CM

## 2023-02-08 PROCEDURE — 99214 OFFICE O/P EST MOD 30 MIN: CPT | Performed by: FAMILY MEDICINE

## 2023-02-08 RX ORDER — TRAZODONE HYDROCHLORIDE 50 MG/1
25 TABLET, FILM COATED ORAL AT BEDTIME
Qty: 90 TABLET | Refills: 0
Start: 2023-02-08 | End: 2024-05-01

## 2023-02-08 ASSESSMENT — PAIN SCALES - GENERAL: PAINLEVEL: MODERATE PAIN (5)

## 2023-02-08 NOTE — PROGRESS NOTES
[facility]  [address] [place] [address]  Phone: [phone]  Primary Provider: Tracey Pitts  Pre-op Performing Provider: TRACEY PITTS      PREOPERATIVE EVALUATION:  Today's date: 2/8/2023    Camelia Bangura is a 77 year old female who presents for a preoperative evaluation.    Surgical Information:  Surgery/Procedure: Total knee arthroplasty, right.  Surgery Location: [facility]  Surgeon: Dr. Escamilla  Surgery Date: 2-23-23  Time of Surgery: Will be in the afternoon, will call as the date gets closer.  Where patient plans to recover: At home with family  Fax number for surgical facility: Note does not need to be faxed, will be available electronically in Epic.    Type of Anesthesia Anticipated: Spinal    Assessment & Plan     The proposed surgical procedure is considered INTERMEDIATE risk.    Preop general physical exam  77 yr old female here for a pre op evaluation.     Major depressive disorder in remission, unspecified whether recurrent (H)  Stable.     Arthritis of right knee  Cleared for surgery.              Risks and Recommendations:  The patient has the following additional risks and recommendations for perioperative complications:   - No identified additional risk factors other than previously addressed    Medication Instructions:   - ibuprofen (Advil, Motrin): HOLD 1 day before surgery.    - naproxen (Aleve, Naprosyn): HOLD 4 days before surgery.     RECOMMENDATION:  APPROVAL GIVEN to proceed with proposed procedure, without further diagnostic evaluation.      [id number]}    Subjective     HPI related to upcoming procedure: 77 yr old female here for a pre op evaluation. She is having a right knee replacement. She has no significant past history and denies any history of CAD, no history of DVTs. She takes estradiol for menopause symptoms. She denies any chest pain , shortness of breath,fevers chills at this time.       Preop  Questions 2/8/2023   1. Have you ever had a heart attack or stroke? No   2. Have you ever had surgery on your heart or blood vessels, such as a stent placement, a coronary artery bypass, or surgery on an artery in your head, neck, heart, or legs? No   3. Do you have chest pain with activity? No   4. Do you have a history of  heart failure? No   5. Do you currently have a cold, bronchitis or symptoms of other infection? No   6. Do you have a cough, shortness of breath, or wheezing? No   7. Do you or anyone in your family have previous history of blood clots? No   8. Do you or does anyone in your family have a serious bleeding problem such as prolonged bleeding following surgeries or cuts? No   9. Have you ever had problems with anemia or been told to take iron pills? No   10. Have you had any abnormal blood loss such as black, tarry or bloody stools, or abnormal vaginal bleeding? No   11. Have you ever had a blood transfusion? No   12. Are you willing to have a blood transfusion if it is medically needed before, during, or after your surgery? Yes   13. Have you or any of your relatives ever had problems with anesthesia? YES - Patient with general.  Nausea discomfort.   14. Do you have sleep apnea, excessive snoring or daytime drowsiness? No   15. Do you have any artifical heart valves or other implanted medical devices like a pacemaker, defibrillator, or continuous glucose monitor? No   16. Do you have artificial joints? No   17. Are you allergic to latex? No     Health Care Directive:  Patient does not have a Health Care Directive or Living Will: Discussed advance care planning with patient; information given to patient to review.    Preoperative Review of :   reviewed - no record of controlled substances prescribed.  [id number]}    Status of Chronic Conditions:  See problem list for active medical problems.  Problems all longstanding and stable, except as noted/documented.  See ROS for pertinent symptoms related  "to these conditions.      Review of Systems  CONSTITUTIONAL: NEGATIVE for fever, chills, change in weight  INTEGUMENTARY/SKIN: NEGATIVE for worrisome rashes, moles or lesions  EYES: NEGATIVE for vision changes or irritation  ENT/MOUTH: NEGATIVE for ear, mouth and throat problems  RESP: NEGATIVE for significant cough or SOB  CV: NEGATIVE for chest pain, palpitations or peripheral edema  GI: NEGATIVE for nausea, abdominal pain, heartburn, or change in bowel habits  : NEGATIVE for frequency, dysuria, or hematuria  MUSCULOSKELETAL:POSITIVE  for joint pain right knee  NEURO: NEGATIVE for weakness, dizziness or paresthesias  ENDOCRINE: NEGATIVE for temperature intolerance, skin/hair changes  HEME: NEGATIVE for bleeding problems  PSYCHIATRIC: NEGATIVE for changes in mood or affect    Patient Active Problem List    Diagnosis Date Noted     Major depressive disorder in remission, unspecified whether recurrent (H) 02/09/2022     Priority: Medium     Eczema of both hands 09/08/2016     Priority: Medium     Advanced directives, counseling/discussion 08/09/2012     Priority: Medium     Patient does not have an Advance/Health Care Directive (HCD), given \"What is Advance Care Planning?\" flyer.    Afia Mckeon  August 9, 2012         Health Care Home 06/13/2012     Priority: Medium     Rebecca Guzman RN-PHN  FPA / ABIMAEL [facility]   [phone]    DX V65.8 REPLACED WITH [id number] HEALTH CARE HOME (04/08/2013)       Chronic rhinitis 06/30/2011     Priority: Medium     HYPERLIPIDEMIA LDL GOAL <130 10/31/2010     Priority: Medium     Postmenopausal atrophic vaginitis 10/10/2007     Priority: Medium     Candidiasis of vulva and vagina 10/10/2007     Priority: Medium     Dysplasia of cervix 05/02/2005     Priority: Medium     S/p TVH; needs yearly pap testing  [id number]  Problem list name updated by automated process. Provider to review       Symptomatic menopausal or female climacteric states 05/02/2005     " Priority: Medium      Past Medical History:   Diagnosis Date     Arthritis      Contact dermatitis and other eczema, due to unspecified cause      Dysplasia of cervix, unspecified 2005     Past Surgical History:   Procedure Laterality Date     HC CORRECT BUNION,SIMPLE  05/23/1990    Right foot     HC REMOVAL OF TONSILS,<13 Y/O       HYSTERECTOMY, PAP NO LONGER INDICATED  04/2005     HYSTERECTOMY, VAGINAL  04/2005     PHACOEMULSIFICATION WITH STANDARD INTRAOCULAR LENS IMPLANT Right 06/21/2021    Procedure: Right eye Cataract Extraction with Implant;  Surgeon: Alexander Mendoza MD;  Location: [facility]     PHACOEMULSIFICATION WITH STANDARD INTRAOCULAR LENS IMPLANT Left 07/21/2021    Procedure: Cataract Extraction with Implant, left eye;  Surgeon: Alexander Mendoza MD;  Location: [facility]     SURGICAL HISTORY OF -   06/19/1995    Bunion deformity & fractured tibial sesamoid left foot     ZZC APPENDECTOMY       Current Outpatient Medications   Medication Sig Dispense Refill     estradiol (ESTRACE) 0.5 MG tablet Place 1 tablet in the vagina, before bed. 30 tablet 3     Probiotic Product (PROBIOTIC PO)        simvastatin (ZOCOR) 40 MG tablet Take 1 tablet (40 mg) by mouth At Bedtime 90 tablet 3     traZODone (DESYREL) 50 MG tablet Take 1 tablet (50 mg) by mouth At Bedtime (Patient taking differently: Take 25 mg by mouth At Bedtime) 30 tablet 11     triamcinolone (KENALOG) 0.1 % external cream Apply  topically 2 times daily. 30 g 11     valACYclovir (VALTREX) 1000 mg tablet Take 1 tablet (1,000 mg) by mouth 2 times daily for 10 days 20 tablet 0       Allergies   Allergen Reactions     Seasonal Allergies         Social History     Tobacco Use     Smoking status: Never     Smokeless tobacco: Never   Substance Use Topics     Alcohol use: Yes     Comment: occasional wine     Family History   Problem Relation Age of Onset     Cerebrovascular Disease Mother      Arthritis Mother      Osteoporosis Mother      Melanoma No  "family hx of      History   Drug Use No         Objective     /62 (BP Location: Right arm, Patient Position: Chair, Cuff Size: Adult Regular)   Pulse 82   Temp 98.5  F (36.9  C) (Tympanic)   Resp 16   Ht 1.495 m (4' 10.86\")   Wt 67.6 kg (149 lb)   SpO2 98%   BMI 30.24 kg/m      Physical Exam    GENERAL APPEARANCE: healthy, alert and no distress     EYES: EOMI, PERRL     HENT: ear canals and TM's normal and nose and mouth without ulcers or lesions     NECK: no adenopathy, no asymmetry, masses, or scars and thyroid normal to palpation     RESP: lungs clear to auscultation - no rales, rhonchi or wheezes     CV: regular rates and rhythm, normal S1 S2, no S3 or S4 and no murmur, click or rub     ABDOMEN:  soft, nontender, no HSM or masses and bowel sounds normal     MS: extremities normal- no gross deformities noted, no evidence of inflammation in joints, FROM in all extremities.     SKIN: no suspicious lesions or rashes     PSYCH: mentation appears normal. and affect normal/bright     LYMPHATICS: No cervical adenopathy    Recent Labs   Lab Test 10/10/22  0949 02/22/21  0920    139   POTASSIUM 4.5 3.8   CR 0.78 0.74        Diagnostics:  No labs were ordered during this visit.   No EKG required, no history of coronary heart disease, significant arrhythmia, peripheral arterial disease or other structural heart disease.    Revised Cardiac Risk Index (RCRI):  The patient has the following serious cardiovascular risks for perioperative complications:   - No serious cardiac risks = 0 points     RCRI Interpretation: 0 points: Class I (very low risk - 0.4% complication rate)           Signed Electronically by: Tracey Pitts MD  Copy of this evaluation report is provided to requesting physician.      "

## 2023-02-08 NOTE — PATIENT INSTRUCTIONS
For informational purposes only. Not to replace the advice of your health care provider. Copyright   2003,  [facility]. All rights reserved. Clinically reviewed by Dena Lane MD. Vibrynt [id number] - REV .  Preparing for Your Surgery  Getting started  A nurse will call you to review your health history and instructions. They will give you an arrival time based on your scheduled surgery time. Please be ready to share:    Your doctor's clinic name and phone number    Your medical, surgical, and anesthesia history    A list of allergies and sensitivities    A list of medicines, including herbal treatments and over-the-counter drugs    Whether the patient has a legal guardian (ask how to send us the papers in advance)  Please tell us if you're pregnant--or if there's any chance you might be pregnant. Some surgeries may injure a fetus (unborn baby), so they require a pregnancy test. Surgeries that are safe for a fetus don't always need a test, and you can choose whether to have one.   If you have a child who's having surgery, please ask for a copy of Preparing for Your Child's Surgery.    Preparing for surgery    Within 10 to 30 days of surgery: Have a pre-op exam (sometimes called an H&P, or History and Physical). This can be done at a clinic or pre-operative center.  ? If you're having a , you may not need this exam. Talk to your care team.    At your pre-op exam, talk to your care team about all medicines you take. If you need to stop any medicines before surgery, ask when to start taking them again.  ? We do this for your safety. Many medicines can make you bleed too much during surgery. Some change how well surgery (anesthesia) drugs work.    Call your insurance company to let them know you're having surgery. (If you don't have insurance, call [phone].)    Call your clinic if there's any change in your health. This includes signs of a cold or flu (sore throat, runny nose,  cough, rash, fever). It also includes a scrape or scratch near the surgery site.    If you have questions on the day of surgery, call your hospital or surgery center.  Eating and drinking guidelines  For your safety: Unless your surgeon tells you otherwise, follow the guidelines below.    Eat and drink as usual until 8 hours before you arrive for surgery. After that, no food or milk.    Drink clear liquids until 2 hours before you arrive. These are liquids you can see through, like water, Gatorade, and Propel Water. They also include plain black coffee and tea (no cream or milk), candy, and breath mints. You can spit out gum when you arrive.    If you drink alcohol: Stop drinking it the night before surgery.    If your care team tells you to take medicine on the morning of surgery, it's okay to take it with a sip of water.  Preventing infection    Shower or bathe the night before and morning of your surgery. Follow the instructions your clinic gave you. (If no instructions, use regular soap.)    Don't shave or clip hair near your surgery site. We'll remove the hair if needed.    Don't smoke or vape the morning of surgery. You may chew nicotine gum up to 2 hours before surgery. A nicotine patch is okay.  ? Note: Some surgeries require you to completely quit smoking and nicotine. Check with your surgeon.    Your care team will make every effort to keep you safe from infection. We will:  ? Clean our hands often with soap and water (or an alcohol-based hand rub).  ? Clean the skin at your surgery site with a special soap that kills germs.  ? Give you a special gown to keep you warm. (Cold raises the risk of infection.)  ? Wear special hair covers, masks, gowns and gloves during surgery.  ? Give antibiotic medicine, if prescribed. Not all surgeries need antibiotics.  What to bring on the day of surgery    Photo ID and insurance card    Copy of your health care directive, if you have one    Glasses and hearing aids (bring  cases)  ? You can't wear contacts during surgery    Inhaler and eye drops, if you use them (tell us about these when you arrive)    CPAP machine or breathing device, if you use them    A few personal items, if spending the night    If you have . . .  ? A pacemaker, ICD (cardiac defibrillator) or other implant: Bring the ID card.  ? An implanted stimulator: Bring the remote control.  ? A legal guardian: Bring a copy of the certified (court-stamped) guardianship papers.  Please remove any jewelry, including body piercings. Leave jewelry and other valuables at home.  If you're going home the day of surgery    You must have a responsible adult drive you home. They should stay with you overnight as well.    If you don't have someone to stay with you, and you aren't safe to go home alone, we may keep you overnight. Insurance often won't pay for this.  After surgery  If it's hard to control your pain or you need more pain medicine, please call your surgeon's office.  Questions?   If you have any questions for your care team, list them here: _________________________________________________________________________________________________________________________________________________________________________ ____________________________________ ____________________________________ ____________________________________

## 2023-02-08 NOTE — H&P (VIEW-ONLY)
[facility]  [address] [address]  Phone: [phone]  Primary Provider: Tracey Pitts  Pre-op Performing Provider: TRACEY PITTS      PREOPERATIVE EVALUATION:  Today's date: 2/8/2023    Camelia Bangura is a 77 year old female who presents for a preoperative evaluation.    Surgical Information:  Surgery/Procedure: Total knee arthroplasty, right.  Surgery Location: [facility]  Surgeon: Dr. Escamilla  Surgery Date: 2-23-23  Time of Surgery: Will be in the afternoon, will call as the date gets closer.  Where patient plans to recover: At home with family  Fax number for surgical facility: Note does not need to be faxed, will be available electronically in Epic.    Type of Anesthesia Anticipated: Spinal    Assessment & Plan     The proposed surgical procedure is considered INTERMEDIATE risk.    Preop general physical exam  77 yr old female here for a pre op evaluation.     Major depressive disorder in remission, unspecified whether recurrent (H)  Stable.     Arthritis of right knee  Cleared for surgery.              Risks and Recommendations:  The patient has the following additional risks and recommendations for perioperative complications:   - No identified additional risk factors other than previously addressed    Medication Instructions:   - ibuprofen (Advil, Motrin): HOLD 1 day before surgery.    - naproxen (Aleve, Naprosyn): HOLD 4 days before surgery.     RECOMMENDATION:  APPROVAL GIVEN to proceed with proposed procedure, without further diagnostic evaluation.      [id number]    Subjective     HPI related to upcoming procedure: 77 yr old female here for a pre op evaluation. She is having a right knee replacement. She has no significant past history and denies any history of CAD, no history of DVTs. She takes estradiol for menopause symptoms. She denies any chest pain , shortness of breath,fevers chills at this time.       Preop  Questions 2/8/2023   1. Have you ever had a heart attack or stroke? No   2. Have you ever had surgery on your heart or blood vessels, such as a stent placement, a coronary artery bypass, or surgery on an artery in your head, neck, heart, or legs? No   3. Do you have chest pain with activity? No   4. Do you have a history of  heart failure? No   5. Do you currently have a cold, bronchitis or symptoms of other infection? No   6. Do you have a cough, shortness of breath, or wheezing? No   7. Do you or anyone in your family have previous history of blood clots? No   8. Do you or does anyone in your family have a serious bleeding problem such as prolonged bleeding following surgeries or cuts? No   9. Have you ever had problems with anemia or been told to take iron pills? No   10. Have you had any abnormal blood loss such as black, tarry or bloody stools, or abnormal vaginal bleeding? No   11. Have you ever had a blood transfusion? No   12. Are you willing to have a blood transfusion if it is medically needed before, during, or after your surgery? Yes   13. Have you or any of your relatives ever had problems with anesthesia? YES - Patient with general.  Nausea discomfort.   14. Do you have sleep apnea, excessive snoring or daytime drowsiness? No   15. Do you have any artifical heart valves or other implanted medical devices like a pacemaker, defibrillator, or continuous glucose monitor? No   16. Do you have artificial joints? No   17. Are you allergic to latex? No     Health Care Directive:  Patient does not have a Health Care Directive or Living Will: Discussed advance care planning with patient; information given to patient to review.    Preoperative Review of :   reviewed - no record of controlled substances prescribed.  [id number]}    Status of Chronic Conditions:  See problem list for active medical problems.  Problems all longstanding and stable, except as noted/documented.  See ROS for pertinent symptoms related  "to these conditions.      Review of Systems  CONSTITUTIONAL: NEGATIVE for fever, chills, change in weight  INTEGUMENTARY/SKIN: NEGATIVE for worrisome rashes, moles or lesions  EYES: NEGATIVE for vision changes or irritation  ENT/MOUTH: NEGATIVE for ear, mouth and throat problems  RESP: NEGATIVE for significant cough or SOB  CV: NEGATIVE for chest pain, palpitations or peripheral edema  GI: NEGATIVE for nausea, abdominal pain, heartburn, or change in bowel habits  : NEGATIVE for frequency, dysuria, or hematuria  MUSCULOSKELETAL:POSITIVE  for joint pain right knee  NEURO: NEGATIVE for weakness, dizziness or paresthesias  ENDOCRINE: NEGATIVE for temperature intolerance, skin/hair changes  HEME: NEGATIVE for bleeding problems  PSYCHIATRIC: NEGATIVE for changes in mood or affect    Patient Active Problem List    Diagnosis Date Noted     Major depressive disorder in remission, unspecified whether recurrent (H) 02/09/2022     Priority: Medium     Eczema of both hands 09/08/2016     Priority: Medium     Advanced directives, counseling/discussion 08/09/2012     Priority: Medium     Patient does not have an Advance/Health Care Directive (HCD), given \"What is Advance Care Planning?\" flyer.    Afia Mckeon  August 9, 2012         Health Care Home 06/13/2012     Priority: Medium     Rebecca Guzman RN-PHN  FPA / [facility]IM[facility]EL [facility]   [phone]    DX V65.8 REPLACED WITH [id number] HEALTH CARE HOME (04/08/2013)       Chronic rhinitis 06/30/2011     Priority: Medium     HYPERLIPIDEMIA LDL GOAL <130 10/31/2010     Priority: Medium     Postmenopausal atrophic vaginitis 10/10/2007     Priority: Medium     Candidiasis of vulva and vagina 10/10/2007     Priority: Medium     Dysplasia of cervix 05/02/2005     Priority: Medium     S/p TVH; needs yearly pap testing  [id number]  Problem list name updated by automated process. Provider to review       Symptomatic menopausal or female climacteric states 05/02/2005     " Priority: Medium      Past Medical History:   Diagnosis Date     Arthritis      Contact dermatitis and other eczema, due to unspecified cause      Dysplasia of cervix, unspecified 2005     Past Surgical History:   Procedure Laterality Date     HC CORRECT BUNION,SIMPLE  05/23/1990    Right foot     HC REMOVAL OF TONSILS,<15 Y/O       HYSTERECTOMY, PAP NO LONGER INDICATED  04/2005     HYSTERECTOMY, VAGINAL  04/2005     PHACOEMULSIFICATION WITH STANDARD INTRAOCULAR LENS IMPLANT Right 06/21/2021    Procedure: Right eye Cataract Extraction with Implant;  Surgeon: Alexander Mendoza MD;  Location: [facility] OR     PHACOEMULSIFICATION WITH STANDARD INTRAOCULAR LENS IMPLANT Left 07/21/2021    Procedure: Cataract Extraction with Implant, left eye;  Surgeon: Alexander Mendoza MD;  Location: [facility] OR     SURGICAL HISTORY OF -   06/19/1995    Bunion deformity & fractured tibial sesamoid left foot     ZZC APPENDECTOMY       Current Outpatient Medications   Medication Sig Dispense Refill     estradiol (ESTRACE) 0.5 MG tablet Place 1 tablet in the vagina, before bed. 30 tablet 3     Probiotic Product (PROBIOTIC PO)        simvastatin (ZOCOR) 40 MG tablet Take 1 tablet (40 mg) by mouth At Bedtime 90 tablet 3     traZODone (DESYREL) 50 MG tablet Take 1 tablet (50 mg) by mouth At Bedtime (Patient taking differently: Take 25 mg by mouth At Bedtime) 30 tablet 11     triamcinolone (KENALOG) 0.1 % external cream Apply  topically 2 times daily. 30 g 11     valACYclovir (VALTREX) 1000 mg tablet Take 1 tablet (1,000 mg) by mouth 2 times daily for 10 days 20 tablet 0       Allergies   Allergen Reactions     Seasonal Allergies         Social History     Tobacco Use     Smoking status: Never     Smokeless tobacco: Never   Substance Use Topics     Alcohol use: Yes     Comment: occasional wine     Family History   Problem Relation Age of Onset     Cerebrovascular Disease Mother      Arthritis Mother      Osteoporosis Mother      Melanoma No  "family hx of      History   Drug Use No         Objective     /62 (BP Location: Right arm, Patient Position: Chair, Cuff Size: Adult Regular)   Pulse 82   Temp 98.5  F (36.9  C) (Tympanic)   Resp 16   Ht 1.495 m (4' 10.86\")   Wt 67.6 kg (149 lb)   SpO2 98%   BMI 30.24 kg/m      Physical Exam    GENERAL APPEARANCE: healthy, alert and no distress     EYES: EOMI, PERRL     HENT: ear canals and TM's normal and nose and mouth without ulcers or lesions     NECK: no adenopathy, no asymmetry, masses, or scars and thyroid normal to palpation     RESP: lungs clear to auscultation - no rales, rhonchi or wheezes     CV: regular rates and rhythm, normal S1 S2, no S3 or S4 and no murmur, click or rub     ABDOMEN:  soft, nontender, no HSM or masses and bowel sounds normal     MS: extremities normal- no gross deformities noted, no evidence of inflammation in joints, FROM in all extremities.     SKIN: no suspicious lesions or rashes     PSYCH: mentation appears normal. and affect normal/bright     LYMPHATICS: No cervical adenopathy    Recent Labs   Lab Test 10/10/22  0949 02/22/21  0920    139   POTASSIUM 4.5 3.8   CR 0.78 0.74        Diagnostics:  No labs were ordered during this visit.   No EKG required, no history of coronary heart disease, significant arrhythmia, peripheral arterial disease or other structural heart disease.    Revised Cardiac Risk Index (RCRI):  The patient has the following serious cardiovascular risks for perioperative complications:   - No serious cardiac risks = 0 points     RCRI Interpretation: 0 points: Class I (very low risk - 0.4% complication rate)           Signed Electronically by: Tracey Pitts MD  Copy of this evaluation report is provided to requesting physician.      "

## 2023-02-21 ENCOUNTER — ANESTHESIA EVENT (OUTPATIENT)
Dept: SURGERY | Facility: CLINIC | Age: 78
End: 2023-02-21
Payer: COMMERCIAL

## 2023-02-21 NOTE — ANESTHESIA PREPROCEDURE EVALUATION
Anesthesia Pre-Procedure Evaluation    Patient: Camelia Bangura   MRN: [id number] : 1945        Procedure : Procedure(s):  TOTAL Knee Arthroplasty          Past Medical History:   Diagnosis Date     Arthritis      Contact dermatitis and other eczema, due to unspecified cause      Dysplasia of cervix, unspecified       Past Surgical History:   Procedure Laterality Date     HC CORRECT BUNION,SIMPLE  1990    Right foot     HC REMOVAL OF TONSILS,<13 Y/O       HYSTERECTOMY, PAP NO LONGER INDICATED  2005     HYSTERECTOMY, VAGINAL  2005     PHACOEMULSIFICATION WITH STANDARD INTRAOCULAR LENS IMPLANT Right 2021    Procedure: Right eye Cataract Extraction with Implant;  Surgeon: Alexander Mendoza MD;  Location: [facility] OR     PHACOEMULSIFICATION WITH STANDARD INTRAOCULAR LENS IMPLANT Left 2021    Procedure: Cataract Extraction with Implant, left eye;  Surgeon: Alexander Mendoza MD;  Location: [facility] OR     SURGICAL HISTORY OF -   1995    Bunion deformity & fractured tibial sesamoid left foot     ZZC APPENDECTOMY        Allergies   Allergen Reactions     Seasonal Allergies       Social History     Tobacco Use     Smoking status: Never     Smokeless tobacco: Never   Substance Use Topics     Alcohol use: Yes     Comment: occasional wine      Wt Readings from Last 1 Encounters:   23 67.6 kg (149 lb)        Anesthesia Evaluation   Pt has had prior anesthetic. Type: MAC, General and Regional.    No history of anesthetic complications       ROS/MED HX  ENT/Pulmonary:     (+) allergic rhinitis,     Neurologic:  - neg neurologic ROS     Cardiovascular:     (+) Dyslipidemia -----Previous cardiac testing   Echo: Date: Results:    Stress Test: Date: Results:    ECG Reviewed: Date:  Results:  Sinus Rhythm   Low voltage in precordial leads.     ABNORMAL   Cath: Date: Results:      METS/Exercise Tolerance: >4 METS    Hematologic:  - neg hematologic  ROS     Musculoskeletal:   (+)  arthritis,     GI/Hepatic:  - neg GI/hepatic ROS     Renal/Genitourinary:  - neg Renal ROS     Endo:     (+) Obesity,     Psychiatric/Substance Use:     (+) psychiatric history depression     Infectious Disease:  - neg infectious disease ROS     Malignancy:  - neg malignancy ROS     Other:  - neg other ROS          Physical Exam    Airway        Mallampati: II   TM distance: > 3 FB   Neck ROM: full   Mouth opening: > 3 cm    Respiratory Devices and Support         Dental       (+) Completely normal teeth      Cardiovascular   cardiovascular exam normal          Pulmonary   pulmonary exam normal                OUTSIDE LABS:  CBC:   Lab Results   Component Value Date    WBC 11.4 (H) 06/07/2016    WBC 9.0 05/23/2005    HGB 13.8 06/07/2016    HGB 13.1 05/23/2005    HCT 41.4 06/07/2016    HCT 38.0 05/23/2005     06/07/2016     05/23/2005     BMP:   Lab Results   Component Value Date     10/10/2022     02/22/2021    POTASSIUM 4.5 10/10/2022    POTASSIUM 3.8 02/22/2021    CHLORIDE 100 10/10/2022    CHLORIDE 105 02/22/2021    CO2 21 (L) 10/10/2022    CO2 31 02/22/2021    BUN 13.9 10/10/2022    BUN 12 02/22/2021    CR 0.78 10/10/2022    CR 0.74 02/22/2021    GLC 99 10/10/2022    GLC 87 02/22/2021     COAGS: No results found for: PTT, INR, FIBR  POC: No results found for: BGM, HCG, HCGS  HEPATIC:   Lab Results   Component Value Date    ALBUMIN 3.6 06/07/2016    PROTTOTAL 7.3 06/07/2016    ALT 15 06/07/2016    AST 14 06/07/2016    ALKPHOS 61 06/07/2016    BILITOTAL 1.0 06/07/2016     OTHER:   Lab Results   Component Value Date    LACT 0.7 06/07/2016    FAUSTINA 9.6 10/10/2022    TSH 3.50 09/11/2007    CRP 0.41 04/11/2005       Anesthesia Plan    ASA Status:  2      Anesthesia Type: Spinal.   Induction: Propofol.   Maintenance: TIVA.        Consents    Anesthesia Plan(s) and associated risks, benefits, and realistic alternatives discussed. Questions answered and patient/representative(s) expressed  understanding.     - Discussed: Risks, Benefits and Alternatives for BOTH SEDATION and the PROCEDURE were discussed     - Discussed with:  Patient         Postoperative Care    Pain management: IV analgesics, Oral pain medications, Multi-modal analgesia.   PONV prophylaxis: Ondansetron (or other 5HT-3), Dexamethasone or Solumedrol     Comments:                KRISTIN Hernandez CRNA

## 2023-02-23 ENCOUNTER — ANCILLARY PROCEDURE (OUTPATIENT)
Dept: ULTRASOUND IMAGING | Facility: CLINIC | Age: 78
End: 2023-02-23
Attending: NURSE ANESTHETIST, CERTIFIED REGISTERED
Payer: COMMERCIAL

## 2023-02-23 ENCOUNTER — APPOINTMENT (OUTPATIENT)
Dept: GENERAL RADIOLOGY | Facility: CLINIC | Age: 78
End: 2023-02-23
Attending: ORTHOPAEDIC SURGERY
Payer: COMMERCIAL

## 2023-02-23 ENCOUNTER — HOSPITAL ENCOUNTER (OUTPATIENT)
Facility: CLINIC | Age: 78
Discharge: HOME | End: 2023-02-24
Attending: ORTHOPAEDIC SURGERY | Admitting: ORTHOPAEDIC SURGERY
Payer: COMMERCIAL

## 2023-02-23 ENCOUNTER — ANESTHESIA (OUTPATIENT)
Dept: SURGERY | Facility: CLINIC | Age: 78
End: 2023-02-23
Payer: COMMERCIAL

## 2023-02-23 DIAGNOSIS — Z96.651 STATUS POST RIGHT KNEE REPLACEMENT: Primary | ICD-10-CM

## 2023-02-23 PROCEDURE — 250N000011 HC RX IP 250 OP 636: Performed by: ORTHOPAEDIC SURGERY

## 2023-02-23 PROCEDURE — 250N000013 HC RX MED GY IP 250 OP 250 PS 637: Performed by: PHYSICIAN ASSISTANT

## 2023-02-23 PROCEDURE — 73560 X-RAY EXAM OF KNEE 1 OR 2: CPT | Mod: RT

## 2023-02-23 PROCEDURE — C1776 JOINT DEVICE (IMPLANTABLE): HCPCS | Performed by: ORTHOPAEDIC SURGERY

## 2023-02-23 PROCEDURE — 250N000011 HC RX IP 250 OP 636: Performed by: NURSE ANESTHETIST, CERTIFIED REGISTERED

## 2023-02-23 PROCEDURE — 271N000001 HC OR GENERAL SUPPLY NON-STERILE: Performed by: ORTHOPAEDIC SURGERY

## 2023-02-23 PROCEDURE — 250N000009 HC RX 250: Performed by: ORTHOPAEDIC SURGERY

## 2023-02-23 PROCEDURE — 250N000013 HC RX MED GY IP 250 OP 250 PS 637: Performed by: NURSE ANESTHETIST, CERTIFIED REGISTERED

## 2023-02-23 PROCEDURE — 250N000009 HC RX 250: Performed by: NURSE ANESTHETIST, CERTIFIED REGISTERED

## 2023-02-23 PROCEDURE — 999N000141 HC STATISTIC PRE-PROCEDURE NURSING ASSESSMENT: Performed by: ORTHOPAEDIC SURGERY

## 2023-02-23 PROCEDURE — 258N000003 HC RX IP 258 OP 636: Performed by: NURSE ANESTHETIST, CERTIFIED REGISTERED

## 2023-02-23 PROCEDURE — 250N000013 HC RX MED GY IP 250 OP 250 PS 637

## 2023-02-23 PROCEDURE — C1713 ANCHOR/SCREW BN/BN,TIS/BN: HCPCS | Performed by: ORTHOPAEDIC SURGERY

## 2023-02-23 PROCEDURE — 258N000003 HC RX IP 258 OP 636: Performed by: ORTHOPAEDIC SURGERY

## 2023-02-23 PROCEDURE — 999N000065 XR KNEE PORT RIGHT 1/2 VIEWS

## 2023-02-23 PROCEDURE — 710N000010 HC RECOVERY PHASE 1, LEVEL 2, PER MIN: Performed by: ORTHOPAEDIC SURGERY

## 2023-02-23 PROCEDURE — 250N000013 HC RX MED GY IP 250 OP 250 PS 637: Performed by: ORTHOPAEDIC SURGERY

## 2023-02-23 PROCEDURE — 370N000017 HC ANESTHESIA TECHNICAL FEE, PER MIN: Performed by: ORTHOPAEDIC SURGERY

## 2023-02-23 PROCEDURE — 250N000011 HC RX IP 250 OP 636: Performed by: PHYSICIAN ASSISTANT

## 2023-02-23 PROCEDURE — 360N000077 HC SURGERY LEVEL 4, PER MIN: Performed by: ORTHOPAEDIC SURGERY

## 2023-02-23 PROCEDURE — 272N000001 HC OR GENERAL SUPPLY STERILE: Performed by: ORTHOPAEDIC SURGERY

## 2023-02-23 DEVICE — ATTUNE KNEE SYSTEM TIBIAL BASE FIXED BEARING SIZE 3 CEMENTED
Type: IMPLANTABLE DEVICE | Site: KNEE | Status: FUNCTIONAL
Brand: ATTUNE

## 2023-02-23 DEVICE — ATTUNE KNEE SYSTEM FEMORAL POSTERIOR STABILIZED NARROW SIZE 4N RIGHT CEMENTED
Type: IMPLANTABLE DEVICE | Site: KNEE | Status: FUNCTIONAL
Brand: ATTUNE

## 2023-02-23 DEVICE — ATTUNE KNEE SYSTEM TIBIAL INSERT FIXED BEARING POSTERIOR STABILIZED 4 5MM AOX
Type: IMPLANTABLE DEVICE | Site: KNEE | Status: FUNCTIONAL
Brand: ATTUNE

## 2023-02-23 DEVICE — ATTUNE PATELLA MEDIALIZED DOME 32MM CEMENTED AOX
Type: IMPLANTABLE DEVICE | Site: KNEE | Status: FUNCTIONAL
Brand: ATTUNE

## 2023-02-23 DEVICE — SIMPLEX® HV IS A FAST-SETTING ACRYLIC RESIN FOR USE IN BONE SURGERY. MIXING THE TWO SEPARATE STERILE COMPONENTS PRODUCES A DUCTILE BONE CEMENT WHICH, AFTER HARDENING, FIXES THE IMPLANT AND TRANSFERS STRESSES PRODUCED DURING MOVEMENT EVENLY TO THE BONE. SIMPLEX® HV CEMENT POWDER ALSO CONTAINS INSOLUBLE ZIRCONIUM DIOXIDE AS AN X-RAY CONTRAST MEDIUM. SIMPLEX® HV DOES NOT EMIT A SIGNAL AND DOES NOT POSE A SAFETY RISK IN A MAGNETIC RESONANCE ENVIRONMENT.
Type: IMPLANTABLE DEVICE | Site: KNEE | Status: FUNCTIONAL
Brand: SIMPLEX HV

## 2023-02-23 RX ORDER — ONDANSETRON 2 MG/ML
INJECTION INTRAMUSCULAR; INTRAVENOUS PRN
Status: DISCONTINUED | OUTPATIENT
Start: 2023-02-23 | End: 2023-02-23

## 2023-02-23 RX ORDER — CEFAZOLIN SODIUM/WATER 2 G/20 ML
2 SYRINGE (ML) INTRAVENOUS SEE ADMIN INSTRUCTIONS
Status: DISCONTINUED | OUTPATIENT
Start: 2023-02-23 | End: 2023-02-23 | Stop reason: HOSPADM

## 2023-02-23 RX ORDER — AMOXICILLIN 250 MG
1 CAPSULE ORAL 2 TIMES DAILY
Status: DISCONTINUED | OUTPATIENT
Start: 2023-02-23 | End: 2023-02-24 | Stop reason: HOSPADM

## 2023-02-23 RX ORDER — NALOXONE HYDROCHLORIDE 0.4 MG/ML
0.4 INJECTION, SOLUTION INTRAMUSCULAR; INTRAVENOUS; SUBCUTANEOUS
Status: DISCONTINUED | OUTPATIENT
Start: 2023-02-23 | End: 2023-02-24 | Stop reason: HOSPADM

## 2023-02-23 RX ORDER — KETOROLAC TROMETHAMINE 15 MG/ML
15 INJECTION, SOLUTION INTRAMUSCULAR; INTRAVENOUS EVERY 6 HOURS
Status: DISCONTINUED | OUTPATIENT
Start: 2023-02-23 | End: 2023-02-24 | Stop reason: HOSPADM

## 2023-02-23 RX ORDER — ONDANSETRON 4 MG/1
4 TABLET, ORALLY DISINTEGRATING ORAL EVERY 6 HOURS PRN
Status: DISCONTINUED | OUTPATIENT
Start: 2023-02-23 | End: 2023-02-24 | Stop reason: HOSPADM

## 2023-02-23 RX ORDER — ONDANSETRON 2 MG/ML
4 INJECTION INTRAMUSCULAR; INTRAVENOUS EVERY 6 HOURS PRN
Status: DISCONTINUED | OUTPATIENT
Start: 2023-02-23 | End: 2023-02-24 | Stop reason: HOSPADM

## 2023-02-23 RX ORDER — FENTANYL CITRATE 50 UG/ML
INJECTION, SOLUTION INTRAMUSCULAR; INTRAVENOUS PRN
Status: DISCONTINUED | OUTPATIENT
Start: 2023-02-23 | End: 2023-02-23

## 2023-02-23 RX ORDER — HYDROXYZINE HYDROCHLORIDE 50 MG/1
50 TABLET, FILM COATED ORAL EVERY 6 HOURS PRN
Status: DISCONTINUED | OUTPATIENT
Start: 2023-02-23 | End: 2023-02-23 | Stop reason: HOSPADM

## 2023-02-23 RX ORDER — OXYCODONE HYDROCHLORIDE 5 MG/1
10 TABLET ORAL EVERY 4 HOURS PRN
Status: DISCONTINUED | OUTPATIENT
Start: 2023-02-23 | End: 2023-02-24 | Stop reason: HOSPADM

## 2023-02-23 RX ORDER — PROPOFOL 10 MG/ML
INJECTION, EMULSION INTRAVENOUS CONTINUOUS PRN
Status: DISCONTINUED | OUTPATIENT
Start: 2023-02-23 | End: 2023-02-23

## 2023-02-23 RX ORDER — SODIUM CHLORIDE, SODIUM LACTATE, POTASSIUM CHLORIDE, CALCIUM CHLORIDE 600; 310; 30; 20 MG/100ML; MG/100ML; MG/100ML; MG/100ML
INJECTION, SOLUTION INTRAVENOUS CONTINUOUS
Status: DISCONTINUED | OUTPATIENT
Start: 2023-02-23 | End: 2023-02-23 | Stop reason: HOSPADM

## 2023-02-23 RX ORDER — HYDROXYZINE HYDROCHLORIDE 10 MG/1
10 TABLET, FILM COATED ORAL EVERY 6 HOURS PRN
Status: DISCONTINUED | OUTPATIENT
Start: 2023-02-23 | End: 2023-02-24 | Stop reason: HOSPADM

## 2023-02-23 RX ORDER — AMOXICILLIN 250 MG
1-2 CAPSULE ORAL 2 TIMES DAILY
Qty: 30 TABLET | Refills: 0 | Status: SHIPPED | OUTPATIENT
Start: 2023-02-23 | End: 2023-05-08

## 2023-02-23 RX ORDER — KETOROLAC TROMETHAMINE 30 MG/ML
INJECTION, SOLUTION INTRAMUSCULAR; INTRAVENOUS PRN
Status: DISCONTINUED | OUTPATIENT
Start: 2023-02-23 | End: 2023-02-23

## 2023-02-23 RX ORDER — ONDANSETRON 4 MG/1
4 TABLET, FILM COATED ORAL EVERY 6 HOURS PRN
Status: DISCONTINUED | OUTPATIENT
Start: 2023-02-23 | End: 2023-02-23

## 2023-02-23 RX ORDER — HYDROXYZINE HYDROCHLORIDE 25 MG/1
25 TABLET, FILM COATED ORAL EVERY 6 HOURS PRN
Qty: 33 TABLET | Refills: 0 | Status: SHIPPED | OUTPATIENT
Start: 2023-02-23 | End: 2023-05-08

## 2023-02-23 RX ORDER — LIDOCAINE 40 MG/G
CREAM TOPICAL
Status: DISCONTINUED | OUTPATIENT
Start: 2023-02-23 | End: 2023-02-24 | Stop reason: HOSPADM

## 2023-02-23 RX ORDER — OXYCODONE HYDROCHLORIDE 5 MG/1
5 TABLET ORAL EVERY 4 HOURS PRN
Status: DISCONTINUED | OUTPATIENT
Start: 2023-02-23 | End: 2023-02-24 | Stop reason: HOSPADM

## 2023-02-23 RX ORDER — ACETAMINOPHEN 325 MG/1
975 TABLET ORAL EVERY 8 HOURS
Status: DISCONTINUED | OUTPATIENT
Start: 2023-02-23 | End: 2023-02-24 | Stop reason: HOSPADM

## 2023-02-23 RX ORDER — ACETAMINOPHEN 325 MG/1
650 TABLET ORAL EVERY 4 HOURS PRN
Status: DISCONTINUED | OUTPATIENT
Start: 2023-02-26 | End: 2023-02-24 | Stop reason: HOSPADM

## 2023-02-23 RX ORDER — ACETAMINOPHEN 325 MG/1
975 TABLET ORAL ONCE
Status: COMPLETED | OUTPATIENT
Start: 2023-02-23 | End: 2023-02-23

## 2023-02-23 RX ORDER — BISACODYL 10 MG
10 SUPPOSITORY, RECTAL RECTAL DAILY PRN
Status: DISCONTINUED | OUTPATIENT
Start: 2023-02-23 | End: 2023-02-24 | Stop reason: HOSPADM

## 2023-02-23 RX ORDER — PROCHLORPERAZINE MALEATE 5 MG
5 TABLET ORAL EVERY 6 HOURS PRN
Status: DISCONTINUED | OUTPATIENT
Start: 2023-02-23 | End: 2023-02-24 | Stop reason: HOSPADM

## 2023-02-23 RX ORDER — HYDROMORPHONE HCL IN WATER/PF 6 MG/30 ML
0.2 PATIENT CONTROLLED ANALGESIA SYRINGE INTRAVENOUS
Status: DISCONTINUED | OUTPATIENT
Start: 2023-02-23 | End: 2023-02-24 | Stop reason: HOSPADM

## 2023-02-23 RX ORDER — ONDANSETRON 4 MG/1
4 TABLET, ORALLY DISINTEGRATING ORAL EVERY 30 MIN PRN
Status: DISCONTINUED | OUTPATIENT
Start: 2023-02-23 | End: 2023-02-23 | Stop reason: HOSPADM

## 2023-02-23 RX ORDER — TRANEXAMIC ACID 650 MG/1
1950 TABLET ORAL ONCE
Status: COMPLETED | OUTPATIENT
Start: 2023-02-23 | End: 2023-02-23

## 2023-02-23 RX ORDER — NALOXONE HYDROCHLORIDE 0.4 MG/ML
0.2 INJECTION, SOLUTION INTRAMUSCULAR; INTRAVENOUS; SUBCUTANEOUS
Status: DISCONTINUED | OUTPATIENT
Start: 2023-02-23 | End: 2023-02-24 | Stop reason: HOSPADM

## 2023-02-23 RX ORDER — POLYETHYLENE GLYCOL 3350 17 G/17G
17 POWDER, FOR SOLUTION ORAL DAILY
Status: DISCONTINUED | OUTPATIENT
Start: 2023-02-24 | End: 2023-02-24 | Stop reason: HOSPADM

## 2023-02-23 RX ORDER — HYDROMORPHONE HCL IN WATER/PF 6 MG/30 ML
0.4 PATIENT CONTROLLED ANALGESIA SYRINGE INTRAVENOUS
Status: DISCONTINUED | OUTPATIENT
Start: 2023-02-23 | End: 2023-02-24 | Stop reason: HOSPADM

## 2023-02-23 RX ORDER — CEFAZOLIN SODIUM/WATER 2 G/20 ML
2 SYRINGE (ML) INTRAVENOUS
Status: COMPLETED | OUTPATIENT
Start: 2023-02-23 | End: 2023-02-23

## 2023-02-23 RX ORDER — LIDOCAINE 40 MG/G
CREAM TOPICAL
Status: DISCONTINUED | OUTPATIENT
Start: 2023-02-23 | End: 2023-02-23 | Stop reason: HOSPADM

## 2023-02-23 RX ORDER — BUPIVACAINE HYDROCHLORIDE 5 MG/ML
INJECTION, SOLUTION PERINEURAL PRN
Status: DISCONTINUED | OUTPATIENT
Start: 2023-02-23 | End: 2023-02-23 | Stop reason: HOSPADM

## 2023-02-23 RX ORDER — MEPERIDINE HYDROCHLORIDE 25 MG/ML
12.5 INJECTION INTRAMUSCULAR; INTRAVENOUS; SUBCUTANEOUS EVERY 5 MIN PRN
Status: DISCONTINUED | OUTPATIENT
Start: 2023-02-23 | End: 2023-02-23 | Stop reason: HOSPADM

## 2023-02-23 RX ORDER — ROPIVACAINE HYDROCHLORIDE 5 MG/ML
INJECTION, SOLUTION EPIDURAL; INFILTRATION; PERINEURAL PRN
Status: DISCONTINUED | OUTPATIENT
Start: 2023-02-23 | End: 2023-02-23

## 2023-02-23 RX ORDER — OXYCODONE HYDROCHLORIDE 5 MG/1
5-10 TABLET ORAL EVERY 4 HOURS PRN
Qty: 33 TABLET | Refills: 0 | Status: SHIPPED | OUTPATIENT
Start: 2023-02-23 | End: 2023-02-24

## 2023-02-23 RX ORDER — HYDROMORPHONE HCL IN WATER/PF 6 MG/30 ML
0.2 PATIENT CONTROLLED ANALGESIA SYRINGE INTRAVENOUS EVERY 5 MIN PRN
Status: DISCONTINUED | OUTPATIENT
Start: 2023-02-23 | End: 2023-02-23 | Stop reason: HOSPADM

## 2023-02-23 RX ORDER — LIDOCAINE HCL/EPINEPHRINE/PF 2%-1:200K
VIAL (ML) INJECTION PRN
Status: DISCONTINUED | OUTPATIENT
Start: 2023-02-23 | End: 2023-02-23

## 2023-02-23 RX ORDER — PROPOFOL 10 MG/ML
INJECTION, EMULSION INTRAVENOUS PRN
Status: DISCONTINUED | OUTPATIENT
Start: 2023-02-23 | End: 2023-02-23

## 2023-02-23 RX ORDER — DEXAMETHASONE SODIUM PHOSPHATE 4 MG/ML
INJECTION, SOLUTION INTRA-ARTICULAR; INTRALESIONAL; INTRAMUSCULAR; INTRAVENOUS; SOFT TISSUE PRN
Status: DISCONTINUED | OUTPATIENT
Start: 2023-02-23 | End: 2023-02-23

## 2023-02-23 RX ORDER — HYDROMORPHONE HCL IN WATER/PF 6 MG/30 ML
0.4 PATIENT CONTROLLED ANALGESIA SYRINGE INTRAVENOUS EVERY 5 MIN PRN
Status: DISCONTINUED | OUTPATIENT
Start: 2023-02-23 | End: 2023-02-23 | Stop reason: HOSPADM

## 2023-02-23 RX ORDER — FENTANYL CITRATE 50 UG/ML
50 INJECTION, SOLUTION INTRAMUSCULAR; INTRAVENOUS EVERY 5 MIN PRN
Status: DISCONTINUED | OUTPATIENT
Start: 2023-02-23 | End: 2023-02-23 | Stop reason: HOSPADM

## 2023-02-23 RX ORDER — SODIUM CHLORIDE, SODIUM LACTATE, POTASSIUM CHLORIDE, CALCIUM CHLORIDE 600; 310; 30; 20 MG/100ML; MG/100ML; MG/100ML; MG/100ML
INJECTION, SOLUTION INTRAVENOUS CONTINUOUS
Status: DISCONTINUED | OUTPATIENT
Start: 2023-02-23 | End: 2023-02-24 | Stop reason: HOSPADM

## 2023-02-23 RX ORDER — FENTANYL CITRATE 50 UG/ML
25 INJECTION, SOLUTION INTRAMUSCULAR; INTRAVENOUS EVERY 5 MIN PRN
Status: DISCONTINUED | OUTPATIENT
Start: 2023-02-23 | End: 2023-02-23 | Stop reason: HOSPADM

## 2023-02-23 RX ORDER — ACETAMINOPHEN 325 MG/1
650 TABLET ORAL EVERY 4 HOURS PRN
Qty: 100 TABLET | Refills: 0
Start: 2023-02-23 | End: 2024-09-18

## 2023-02-23 RX ORDER — HYDROXYZINE HYDROCHLORIDE 25 MG/1
25 TABLET, FILM COATED ORAL EVERY 6 HOURS PRN
Status: DISCONTINUED | OUTPATIENT
Start: 2023-02-23 | End: 2023-02-23 | Stop reason: HOSPADM

## 2023-02-23 RX ORDER — CEFAZOLIN SODIUM 1 G/3ML
1 INJECTION, POWDER, FOR SOLUTION INTRAMUSCULAR; INTRAVENOUS EVERY 8 HOURS
Status: COMPLETED | OUTPATIENT
Start: 2023-02-23 | End: 2023-02-24

## 2023-02-23 RX ORDER — ONDANSETRON 2 MG/ML
4 INJECTION INTRAMUSCULAR; INTRAVENOUS EVERY 30 MIN PRN
Status: DISCONTINUED | OUTPATIENT
Start: 2023-02-23 | End: 2023-02-23 | Stop reason: HOSPADM

## 2023-02-23 RX ADMIN — TRAZODONE HYDROCHLORIDE 25 MG: 50 TABLET ORAL at 23:23

## 2023-02-23 RX ADMIN — ROPIVACAINE HYDROCHLORIDE 20 ML: 5 INJECTION, SOLUTION EPIDURAL; INFILTRATION; PERINEURAL at 14:48

## 2023-02-23 RX ADMIN — HYDROMORPHONE HYDROCHLORIDE 1 MG: 1 INJECTION, SOLUTION INTRAMUSCULAR; INTRAVENOUS; SUBCUTANEOUS at 13:25

## 2023-02-23 RX ADMIN — MIDAZOLAM 2 MG: 1 INJECTION INTRAMUSCULAR; INTRAVENOUS at 12:52

## 2023-02-23 RX ADMIN — ACETAMINOPHEN 975 MG: 325 TABLET, FILM COATED ORAL at 11:38

## 2023-02-23 RX ADMIN — ASPIRIN 325 MG: 325 TABLET, COATED ORAL at 22:08

## 2023-02-23 RX ADMIN — ONDANSETRON 4 MG: 2 INJECTION INTRAMUSCULAR; INTRAVENOUS at 12:52

## 2023-02-23 RX ADMIN — TRANEXAMIC ACID 1950 MG: 650 TABLET ORAL at 11:38

## 2023-02-23 RX ADMIN — LIDOCAINE HYDROCHLORIDE 0.1 ML: 10 INJECTION, SOLUTION EPIDURAL; INFILTRATION; INTRACAUDAL; PERINEURAL at 12:02

## 2023-02-23 RX ADMIN — Medication 2 G: at 12:47

## 2023-02-23 RX ADMIN — KETOROLAC TROMETHAMINE 15 MG: 30 INJECTION, SOLUTION INTRAMUSCULAR at 14:05

## 2023-02-23 RX ADMIN — ROCURONIUM BROMIDE 20 MG: 50 INJECTION, SOLUTION INTRAVENOUS at 13:20

## 2023-02-23 RX ADMIN — SODIUM CHLORIDE, POTASSIUM CHLORIDE, SODIUM LACTATE AND CALCIUM CHLORIDE: 600; 310; 30; 20 INJECTION, SOLUTION INTRAVENOUS at 14:16

## 2023-02-23 RX ADMIN — ACETAMINOPHEN 975 MG: 325 TABLET, FILM COATED ORAL at 19:47

## 2023-02-23 RX ADMIN — SENNOSIDES AND DOCUSATE SODIUM 1 TABLET: 50; 8.6 TABLET ORAL at 19:48

## 2023-02-23 RX ADMIN — DEXAMETHASONE SODIUM PHOSPHATE 8 MG: 4 INJECTION, SOLUTION INTRA-ARTICULAR; INTRALESIONAL; INTRAMUSCULAR; INTRAVENOUS; SOFT TISSUE at 13:20

## 2023-02-23 RX ADMIN — KETOROLAC TROMETHAMINE 15 MG: 15 INJECTION, SOLUTION INTRAMUSCULAR; INTRAVENOUS at 19:50

## 2023-02-23 RX ADMIN — CEFAZOLIN 1 G: 1 INJECTION, POWDER, FOR SOLUTION INTRAMUSCULAR; INTRAVENOUS at 20:27

## 2023-02-23 RX ADMIN — SODIUM CHLORIDE, POTASSIUM CHLORIDE, SODIUM LACTATE AND CALCIUM CHLORIDE: 600; 310; 30; 20 INJECTION, SOLUTION INTRAVENOUS at 17:53

## 2023-02-23 RX ADMIN — FENTANYL CITRATE 100 MCG: 50 INJECTION, SOLUTION INTRAMUSCULAR; INTRAVENOUS at 12:56

## 2023-02-23 RX ADMIN — HYDROMORPHONE HYDROCHLORIDE 1 MG: 1 INJECTION, SOLUTION INTRAMUSCULAR; INTRAVENOUS; SUBCUTANEOUS at 13:50

## 2023-02-23 RX ADMIN — SODIUM CHLORIDE, POTASSIUM CHLORIDE, SODIUM LACTATE AND CALCIUM CHLORIDE 1000 ML: 600; 310; 30; 20 INJECTION, SOLUTION INTRAVENOUS at 12:02

## 2023-02-23 RX ADMIN — PROPOFOL 200 MCG/KG/MIN: 10 INJECTION, EMULSION INTRAVENOUS at 13:20

## 2023-02-23 RX ADMIN — PROPOFOL 100 MG: 10 INJECTION, EMULSION INTRAVENOUS at 13:20

## 2023-02-23 RX ADMIN — LIDOCAINE HYDROCHLORIDE,EPINEPHRINE BITARTRATE 5 ML: 20; .005 INJECTION, SOLUTION EPIDURAL; INFILTRATION; INTRACAUDAL; PERINEURAL at 14:45

## 2023-02-23 RX ADMIN — HYDROXYZINE HYDROCHLORIDE 10 MG: 10 TABLET ORAL at 22:09

## 2023-02-23 RX ADMIN — SUGAMMADEX 150 MG: 100 INJECTION, SOLUTION INTRAVENOUS at 14:05

## 2023-02-23 RX ADMIN — MIDAZOLAM 1 MG: 1 INJECTION INTRAMUSCULAR; INTRAVENOUS at 13:12

## 2023-02-23 ASSESSMENT — COLUMBIA-SUICIDE SEVERITY RATING SCALE - C-SSRS
2. HAVE YOU ACTUALLY HAD ANY THOUGHTS OF KILLING YOURSELF IN THE PAST MONTH?: NO
3. HAVE YOU BEEN THINKING ABOUT HOW YOU MIGHT KILL YOURSELF?: NO
4. HAVE YOU HAD THESE THOUGHTS AND HAD SOME INTENTION OF ACTING ON THEM?: NO
6. HAVE YOU EVER DONE ANYTHING, STARTED TO DO ANYTHING, OR PREPARED TO DO ANYTHING TO END YOUR LIFE?: NO
1. IN THE PAST MONTH, HAVE YOU WISHED YOU WERE DEAD OR WISHED YOU COULD GO TO SLEEP AND NOT WAKE UP?: NO
5. HAVE YOU STARTED TO WORK OUT OR WORKED OUT THE DETAILS OF HOW TO KILL YOURSELF? DO YOU INTEND TO CARRY OUT THIS PLAN?: NO

## 2023-02-23 ASSESSMENT — ACTIVITIES OF DAILY LIVING (ADL)
ADLS_ACUITY_SCORE: 20
ADLS_ACUITY_SCORE: 24
ADLS_ACUITY_SCORE: 24
ADLS_ACUITY_SCORE: 21
ADLS_ACUITY_SCORE: 24
ADLS_ACUITY_SCORE: 21
ADLS_ACUITY_SCORE: 24

## 2023-02-23 NOTE — ANESTHESIA PROCEDURE NOTES
Airway       Patient location during procedure: OR       Procedure Start/Stop Times: 2/23/2023 1:22 PM  Staff -        CRNA: Rehana Collazo APRN CRNA       Performed By: CRNA  Consent for Airway        Urgency: elective  Indications and Patient Condition       Indications for airway management: alexis-procedural and airway protection       Induction type:intravenous       Mask difficulty assessment: 1 - vent by mask    Final Airway Details       Final airway type: endotracheal airway       Successful airway: ETT - single  Endotracheal Airway Details        ETT size (mm): 7.0       Cuffed: yes       Successful intubation technique: video laryngoscopy       VL Blade Size: [place] 3       Grade View of Cords: 1       Adjucts: stylet       Position: Right       Measured from: gums/teeth       Secured at (cm): 21       Bite block used: None    Post intubation assessment        Placement verified by: capnometry, equal breath sounds and chest rise        Number of attempts at approach: 1       Number of other approaches attempted: 0       Secured with: silk tape       Ease of procedure: easy       Dentition: Intact and Unchanged    Medication(s) Administered   Medication Administration Time: 2/23/2023 1:22 PM

## 2023-02-23 NOTE — ANESTHESIA PROCEDURE NOTES
"Femoral (ACB) Procedure Note    Pre-Procedure   Staff -        CRNA: Rehana Collazo APRN CRNA       Performed By: CRNA       Location: post-op       Pre-Anesthestic Checklist: patient identified, IV checked, site marked, risks and benefits discussed, informed consent, monitors and equipment checked, pre-op evaluation, at physician/surgeon's request and post-op pain management  Timeout:       Correct Patient: Yes        Correct Procedure: Yes        Correct Site: Yes        Correct Position: Yes        Correct Laterality: Yes        Site Marked: Yes  Procedure Documentation  Procedure: Femoral (ACB)       Laterality: right       Patient Position: supine       Patient Prep/Sterile Barriers: patient draped       Skin prep: Chloraprep       Needle Type: insulated       Needle Gauge: 21.        Needle Length (Inches): 3.13        Ultrasound guided       1. Ultrasound was used to identify targeted nerve, plexus, vascular marker, or fascial plane and place a needle adjacent to it in real-time.       2. Ultrasound was used to visualize the spread of anesthetic in close proximity to the above referenced structure.       3. A permanent image is entered into the patient's record.       4. The visualized anatomic structures appeared normal.       5. There were no apparent abnormal pathologic findings.    Assessment/Narrative         The placement was negative for: blood aspirated, painful injection and site bleeding       Paresthesias: No.       Test dose of 5 mL lidocaine 2% w/ 1:200,000 epinephrine at 14:45 CST.         Test dose negative, 3 minutes after injection, for signs of intravascular, subdural, or intrathecal injection.       Bolus given via needle. no blood aspirated via catheter.        Secured via.        Insertion/Infusion Method: Single Shot       Complications: none      FOR [facility] ([facility] ONLY:   Pain Team Contact information: please page the Pain Team Via [employer]. Search \"Pain\". During daytime " hours, please page the attending first. At night please page the resident first.

## 2023-02-23 NOTE — ANESTHESIA POSTPROCEDURE EVALUATION
Patient: Camelia Bangura    Procedure: Procedure(s):  TOTAL Knee Arthroplasty       Anesthesia Type:  Spinal    Note:  Disposition: Inpatient   Postop Pain Control: Uneventful            Sign Out: Well controlled pain   PONV: No   Neuro/Psych:    Airway/Respiratory: Uneventful            Sign Out: Acceptable/Baseline resp. status   CV/Hemodynamics: Uneventful            Sign Out: Acceptable CV status; No obvious hypovolemia; No obvious fluid overload   Other NRE: NONE   DID A NON-ROUTINE EVENT OCCUR? No           Last vitals:  Vitals Value Taken Time   /70 02/23/23 1515   Temp 36.9  C (98.4  F) 02/23/23 1515   Pulse 74 02/23/23 1528   Resp 8 02/23/23 1528   SpO2 99 % 02/23/23 1528   Vitals shown include unvalidated device data.    Electronically Signed By: Domingo Flores CRNA, APRN CRNA  February 23, 2023  3:29 PM

## 2023-02-23 NOTE — INTERVAL H&P NOTE
I have reviewed the surgical (or preoperative) H&P that is linked to this encounter, and examined the patient. There are no significant changes    Clinical Conditions Present on Arrival:  Clinically Significant Risk Factors Present on Admission                    # Overweight: Estimated body mass index is 29.1 kg/m  as calculated from the following:    Height as of this encounter: 1.524 m (5').    Weight as of this encounter: 67.6 kg (149 lb).

## 2023-02-23 NOTE — ANESTHESIA CARE TRANSFER NOTE
Patient: Camelia Bangura    Procedure: Procedure(s):  TOTAL Knee Arthroplasty       Diagnosis: Arthritis of right knee [M17.11]  Diagnosis Additional Information: No value filed.    Anesthesia Type:   Spinal     Note:    Oropharynx: oral airway in place  Level of Consciousness: drowsy  Oxygen Supplementation: face mask    Independent Airway: airway patency satisfactory and stable  Dentition: dentition unchanged  Vital Signs Stable: post-procedure vital signs reviewed and stable  Report to RN Given: handoff report given  Patient transferred to: PACU    Handoff Report: Identifed the Patient, Identified the Reponsible Provider, Reviewed the pertinent medical history, Discussed the surgical course, Reviewed Intra-OP anesthesia mangement and issues during anesthesia, Set expectations for post-procedure period and Allowed opportunity for questions and acknowledgement of understanding      Vitals:  Vitals Value Taken Time   /65 02/23/23 1445   Temp 36.9  C (98.4  F) 02/23/23 1432   Pulse 76 02/23/23 1452   Resp 19 02/23/23 1452   SpO2 99 % 02/23/23 1452   Vitals shown include unvalidated device data.    Electronically Signed By: KRISTIN James CRNA  February 23, 2023  2:53 PM

## 2023-02-23 NOTE — PROCEDURES
02/23/23    PREOPERATIVE DIAGNOSES: Right knee arthritis   POSTOPERATIVE DIAGNOSIS:  Right knee arthritis  PROCEDURE: Right total knee arthroplasty.   SURGEON: Charlie Escamilla MD   ASSISTANT: Carla Baez PA-C The presence of the PA was necessary for safe progression of the case.   ANESTHESIA: General    ESTIMATED BLOOD LOSS: 50 mL.   TOURNIQUET TIME: 30 minutes at 250 mmHg.   COMPLICATIONS: None apparent.   DISPOSITION: Stable to PACU.    IMPLANTS USED: DePuy Attune size 4N posterior stabilized femoral component with a size 3 S+ tibial component, size 4 by 5mm posterior stabilized polyethylene and 32 mm patella.     INDICATIONS: Camelia is a 78 year old female with a long history of progressive right knee pain due to end stage arthritis. Unfortunately, she failed conservative management including therapy and injections. After discussing risks, benefits and surgery, she elected to proceed with a right total knee replacement understanding the risks of infection, damage to vessels and nerves, blood clots, stiffness, ongoing pain, need for revision surgery, need for transfusion.     PROCEDURE: Camelia was brought to the preoperative holding area where the right knee was marked. Consent was reviewed. She then was transferred to the operating theater. A spinal anesthesia was attempted but unsuccessful so she therefore had general anesthesia.  She was placed supine with a bump under the right hip.  A timeout was performed, verifying correct patient, surgery, location. She received preoperative antibiotics as well as transexemic acid. The right lower extremity was then prepped and draped in standard sterile fashion.     We exsanguinated the leg and inflated the tourniquet. We then made a longitudinal incision over the anterior aspect of the knee. Dissection was carried down through skin and subcutaneous tissue. A medial parapatellar arthrotomy was made, exposing patellofemoral arthritis with significant trochlear  dysplasia.  Synovial tissue from the suprapatellar pouch excised. The anterior horn of the medial meniscus was released and a gentle medial release was performed. Then, the remainder of the fat pad was excised. We turned our attention to the patella. Using a free hand technique, this was resected down to 12 mm and sized to a 32mm, then punched and a metal protector plate was placed. We then turned our attention to the femur. Preoperatively, there was a 3 degree flexion contracture.  Therefore, using an intramedullary alignment guide, 10 mm of distal femur was resected in 4 degrees of valgus.     We then turned our attention to the tibia.  An extramedullary alignment cut was used to resect 2mm off the low medial side, perpendicular to the mechanical axis.  We then turned our attention back to the distal femur and sized it to a size 3.  The epicondylar axis was established and we placed our 4-in-1 cutting block perpendicular to it, in 4 degrees of external rotation. With this in place, our anterior, posterior and chamfer distal femur cuts were made.  We then used a laminar  to open the joint in order to remove medial and lateral meniscus remnants as well as posterior osteophytes.  The jig was utilized to cut the distal femoral box for the PS component.  A variety of polyethylene were trialed, and we felt a 5mm was best, with range of motion from full extension to 135 of flexion, stability to varus and valgus stress, normal POLO test, and the patella tracked well.  After this, sized our tibial component to a 3.  We then drilled and punched our tibial component, lining it with the medial 1/3 of the tibial tubercle. The knee was thoroughly irrigated using pulse lavage. The final components were then cemented in place. All excess cement was removed and the knee was placed into full extension while the cement was curing. Also, the wound was instilled with dilute Betadine solution. Once the cement had cured, we  retrialed our components with a 5mm poly with findings as above. We then placed the final poly.  The tourniquet was deflated with hemostasis achieved.  The capsular layer was closed with #1 vicryl with imbrication sutures to help patellar tracking followed by #1 Stratafix, at which point there was 130 degrees of flexion with gravity.  Subcutaneous tissues with 2-0 Vicryl, skin with a 3-0 Monocryl. A sterile dressing was applied and the patient was awoken from anesthesia and transferred to PACU in stable condition.     POSTOPERATIVE PLAN:   1. Weightbearing as tolerated right lower extremity with PT for mobilization.   2. Deep venous thrombosis prophylaxis: Aspirin 325mg daily x 30 days   3. Perioperative antibiotics.   4. Follow up in 2 weeks for wound check.     MARA RAI MD

## 2023-02-23 NOTE — PROVIDER NOTIFICATION
02/23/23 1204   Discharge Planning   Patient/Family Anticipates Transition to  --    Concerns to be Addressed all concerns addressed in this encounter   Living Arrangements   People in Home  --    Type of Residence Private Residence   Stair Railings, Within Home, Primary railings safe and in good condition   Once home, are you able to live on one level? No   Which rooms are not on the main level? Bathroom;Bedroom   Bathroom Shower/Tub Tub/Shower unit   Equipment Currently Used at Home shower chair;grab bar, tub/shower;walker, standard   Support System   Support Systems Spouse/Significant Other   Education   Has the patient scheduled or completed pre-op total joint education, either in class or online, in the last 12 months? No  (read packet)

## 2023-02-24 ENCOUNTER — APPOINTMENT (OUTPATIENT)
Dept: PHYSICAL THERAPY | Facility: CLINIC | Age: 78
End: 2023-02-24
Attending: ORTHOPAEDIC SURGERY
Payer: COMMERCIAL

## 2023-02-24 VITALS
WEIGHT: 149 LBS | DIASTOLIC BLOOD PRESSURE: 47 MMHG | TEMPERATURE: 98.2 F | OXYGEN SATURATION: 97 % | BODY MASS INDEX: 29.25 KG/M2 | HEART RATE: 67 BPM | SYSTOLIC BLOOD PRESSURE: 108 MMHG | RESPIRATION RATE: 18 BRPM | HEIGHT: 60 IN

## 2023-02-24 LAB
GLUCOSE BLDC GLUCOMTR-MCNC: 103 MG/DL (ref 70–99)
HGB BLD-MCNC: 11.2 G/DL (ref 11.7–15.7)
HOLD SPECIMEN: NORMAL

## 2023-02-24 PROCEDURE — 250N000013 HC RX MED GY IP 250 OP 250 PS 637: Performed by: ORTHOPAEDIC SURGERY

## 2023-02-24 PROCEDURE — 250N000011 HC RX IP 250 OP 636: Performed by: ORTHOPAEDIC SURGERY

## 2023-02-24 PROCEDURE — 97110 THERAPEUTIC EXERCISES: CPT | Mod: GP

## 2023-02-24 PROCEDURE — 97116 GAIT TRAINING THERAPY: CPT | Mod: GP

## 2023-02-24 PROCEDURE — 97161 PT EVAL LOW COMPLEX 20 MIN: CPT | Mod: GP

## 2023-02-24 PROCEDURE — 82962 GLUCOSE BLOOD TEST: CPT

## 2023-02-24 PROCEDURE — 36415 COLL VENOUS BLD VENIPUNCTURE: CPT | Performed by: ORTHOPAEDIC SURGERY

## 2023-02-24 PROCEDURE — 85018 HEMOGLOBIN: CPT | Performed by: ORTHOPAEDIC SURGERY

## 2023-02-24 RX ORDER — OXYCODONE HYDROCHLORIDE 5 MG/1
5-10 TABLET ORAL EVERY 4 HOURS PRN
Qty: 33 TABLET | Refills: 0 | Status: SHIPPED | OUTPATIENT
Start: 2023-02-24 | End: 2023-05-08

## 2023-02-24 RX ADMIN — POLYETHYLENE GLYCOL 3350 17 G: 17 POWDER, FOR SOLUTION ORAL at 08:34

## 2023-02-24 RX ADMIN — SENNOSIDES AND DOCUSATE SODIUM 1 TABLET: 50; 8.6 TABLET ORAL at 07:51

## 2023-02-24 RX ADMIN — CEFAZOLIN 1 G: 1 INJECTION, POWDER, FOR SOLUTION INTRAMUSCULAR; INTRAVENOUS at 05:24

## 2023-02-24 RX ADMIN — KETOROLAC TROMETHAMINE 15 MG: 15 INJECTION, SOLUTION INTRAMUSCULAR; INTRAVENOUS at 02:58

## 2023-02-24 RX ADMIN — ACETAMINOPHEN 975 MG: 325 TABLET, FILM COATED ORAL at 02:58

## 2023-02-24 RX ADMIN — KETOROLAC TROMETHAMINE 15 MG: 15 INJECTION, SOLUTION INTRAMUSCULAR; INTRAVENOUS at 08:34

## 2023-02-24 RX ADMIN — HYDROXYZINE HYDROCHLORIDE 10 MG: 10 TABLET ORAL at 05:24

## 2023-02-24 ASSESSMENT — ACTIVITIES OF DAILY LIVING (ADL)
ADLS_ACUITY_SCORE: 22
ADLS_ACUITY_SCORE: 24
ADLS_ACUITY_SCORE: 24
ADLS_ACUITY_SCORE: 22
ADLS_ACUITY_SCORE: 22

## 2023-02-24 NOTE — PROGRESS NOTES
Patient vital signs are at baseline: Yes  Patient able to ambulate as they were prior to admission or with assist devices provided by therapies during their stay: Patient ambulated in the hallway with a walker and stand by assist.  Patient MUST void prior to discharge:  Yes  Patient able to tolerate oral intake:  Yes  Pain has adequate pain control using Oral analgesics:  Yes  Does patient have an identified :  Yes  Has goal D/C date and time been discussed with patient:  Yes

## 2023-02-24 NOTE — DISCHARGE SUMMARY
[facility] Orthopedics Discharge Summary                                  [facility]     DAX MACIAS [id number]   Age: 78 year old  PCP: Tracey Pitts, [phone] 1945     Date of Admission:  2/23/2023  Date of Discharge::  2/24/2023 10:41 AM  Discharge Physician:  Gama Sheikh PA-C    Code status:  Full Code    Admission Information:  Admission Diagnosis:  Arthritis of right knee [M17.11]  S/P knee replacement [Z96.659]    Post-Operative Day: 1 Day Post-Op     Reason for admission:  The patient was admitted for the following:Procedure(s) (LRB):  TOTAL Knee Arthroplasty, Right (Right)    Active Problems:    * No active hospital problems. *      Allergies:  Seasonal allergies    Following the procedure noted above the patient was transferred to the post-op floor and started on:    Therapy:  physical therapy  Anticoagulation Plan: ASA 325mg daily for 30 days  Pain Management: oxycodone, toradol, tylenol and vistaril  Weight bearing status: Weight bearing as tolerated     The patient was followed and co-managed by the hospitalist service during the inpatient treatment course  Complications:  None  Consultations:  None     Pertinent Labs   Lab Results: personally reviewed.     Recent Labs   Lab Test 02/24/23  0412 10/10/22  0949 02/22/21  0920 06/07/16  2305   HGB 11.2*  --   --  13.8   HCT  --   --   --  41.4   MCV  --   --   --  90   PLT  --   --   --  216   NA  --  139 139 139          Discharge Information:  Condition at discharge: Stable  Discharge destination:  Discharged to home     Medications at discharge:  Current Discharge Medication List      START taking these medications    Details   acetaminophen (TYLENOL) 325 MG tablet Take 2 tablets (650 mg) by mouth every 4 hours as needed for other (mild pain)  Qty: 100 tablet, Refills: 0    Associated Diagnoses: Status post right knee replacement      aspirin (ASA) 325 MG EC tablet Take 1 tablet (325 mg) by mouth  daily  Qty: 30 tablet, Refills: 0    Associated Diagnoses: Status post right knee replacement      hydrOXYzine (ATARAX) 25 MG tablet Take 1 tablet (25 mg) by mouth every 6 hours as needed for itching or anxiety (with pain, moderate pain)  Qty: 33 tablet, Refills: 0    Associated Diagnoses: Status post right knee replacement      oxyCODONE (ROXICODONE) 5 MG tablet Take 1-2 tablets (5-10 mg) by mouth every 4 hours as needed for moderate to severe pain  Qty: 33 tablet, Refills: 0    Associated Diagnoses: Status post right knee replacement      senna-docusate (SENOKOT-S/PERICOLACE) 8.6-50 MG tablet Take 1-2 tablets by mouth 2 times daily Take while on oral narcotics to prevent or treat constipation.  Qty: 30 tablet, Refills: 0    Comments: While taking narcotics  Associated Diagnoses: Status post right knee replacement         CONTINUE these medications which have NOT CHANGED    Details   estradiol (ESTRACE) 0.5 MG tablet Place 1 tablet in the vagina, before bed.  Qty: 30 tablet, Refills: 3    Associated Diagnoses: Vaginal dryness, menopausal      Probiotic Product (PROBIOTIC PO)       traZODone (DESYREL) 50 MG tablet Take 0.5 tablets (25 mg) by mouth At Bedtime  Qty: 90 tablet, Refills: 0    Associated Diagnoses: Insomnia, unspecified type      triamcinolone (KENALOG) 0.1 % external cream Apply  topically 2 times daily.  Qty: 30 g, Refills: 11    Associated Diagnoses: Eczema of both hands      simvastatin (ZOCOR) 40 MG tablet Take 1 tablet (40 mg) by mouth At Bedtime  Qty: 90 tablet, Refills: 3    Associated Diagnoses: Hyperlipidemia LDL goal <130      valACYclovir (VALTREX) 1000 mg tablet Take 1 tablet (1,000 mg) by mouth 2 times daily for 10 days  Qty: 20 tablet, Refills: 0                        Follow-Up Care:  Patient should be seen in the office in 14 days by the Orthopedic Surgeon/Physician Assistant.  Call [phone] for appointment or questions.    Gama Sheikh PA-C

## 2023-02-24 NOTE — PROGRESS NOTES
02/24/23 0804   Appointment Info   Signing Clinician's Name / Credentials (PT) Adela Mejias, JOHN   Quick Adds   Quick Adds Certification   Living Environment   People in Home spouse   Current Living Arrangements house   Home Accessibility stairs to enter home;stairs within home   Number of Stairs, Main Entrance 3   Stair Railings, Main Entrance railings safe and in good condition   Number of Stairs, Within Home, Primary seven   Stair Railings, Within Home, Primary railings safe and in good condition   Living Environment Comments All needs met on upper level.   Self-Care   Equipment Currently Used at Home shower chair;grab bar, tub/shower;walker, standard   Fall history within last six months no   Activity/Exercise/Self-Care Comment Indep with mobility without device, Indep with ADL's and IADL's. Spouse able to assist.   General Information   Onset of Illness/Injury or Date of Surgery 02/23/23   Referring Physician Charlie Escamilla MD   Patient/Family Therapy Goals Statement (PT) Return home today   Pertinent History of Current Problem (include personal factors and/or comorbidities that impact the POC) 78 y.o. female s/p R TKA performed 2/23, now WBAT without restrictions.   Weight-Bearing Status - RLE weight-bearing as tolerated   Cognition   Affect/Mental Status (Cognition) WFL   Orientation Status (Cognition) oriented x 4   Follows Commands (Cognition) WFL   Pain Assessment   Patient Currently in Pain Yes, see Vital Sign flowsheet   Range of Motion (ROM)   Range of Motion ROM deficits secondary to surgical procedure;ROM deficits secondary to pain   Strength (Manual Muscle Testing)   Strength Comments Strength limited by increased R knee pain   Bed Mobility   Comment, (Bed Mobility) supine>sit with SBA   Transfers   Comment, (Transfers) sit>stand from EOB with 2WW and SBA   Gait/Stairs (Locomotion)   [place] Level (Gait) supervision   Assistive Device (Gait) walker, front-wheeled   Distance in Feet 15    Distance in Feet (Gait) 100   Pattern (Gait) step-to   Deviations/Abnormal Patterns (Gait) antalgic;gait speed decreased;weight shifting decreased   Maintains Weight-bearing Status (Gait) able to maintain   Negotiation (Stairs) stairs independence;handrail location;number of steps;ascending technique;descending technique   [place] Level (Stairs) supervision   Handrail Location (Stairs) both sides   Number of Steps (Stairs) 5   Ascending Technique (Stairs) step-to-step   Descending Technique (Stairs) step-to-step   Clinical Impression   Criteria for Skilled Therapeutic Intervention Yes, treatment indicated   PT Diagnosis (PT) impaired functional mobility s/p R TKA   Influenced by the following impairments pain, LE weakness, reduced activity tolerance   Functional limitations due to impairments impaired bed mobility, transfers, gait, stairs   Clinical Presentation (PT Evaluation Complexity) Evolving/Changing   Clinical Presentation Rationale clinical reasoning   Clinical Decision Making (Complexity) low complexity   Planned Therapy Interventions (PT) balance training;bed mobility training;cryotherapy;gait training;home exercise program;patient/family education;ROM (range of motion);stair training;strengthening;transfer training   Anticipated Equipment Needs at Discharge (PT)   (has all equipment; has 4WW so may get 2WW from [facility] prior to discharge)   Risk & Benefits of therapy have been explained evaluation/treatment results reviewed;care plan/treatment goals reviewed;current/potential barriers reviewed;risks/benefits reviewed;participants voiced agreement with care plan;participants included;patient   PT Total Evaluation Time   PT Eval, Low Complexity Minutes (70167) 10   Therapy Certification   Start of care date 02/24/23   Certification date from 02/24/23   Certification date to 02/24/23   Medical Diagnosis s/p R TKA   Physical Therapy Goals   PT Frequency One time eval and treatment only   PT Predicted  Duration/Target Date for Goal Attainment 02/24/23   PT Goals Transfers;Gait;Stairs;PT Goal 1   PT: Transfers Supervision/stand-by assist;Sit to/from stand;Goal Met   PT: Gait Supervision/stand-by assist;Standard walker;100 feet;Goal Met   PT: Stairs Supervision/stand-by assist;5 stairs;Rail on both sides;Goal Met   PT: Goal 1 Pt will be indep in R TKA HEP in order to progress aftercares. Goal met.   Interventions   Interventions Quick Adds Gait Training;Therapeutic Procedure   Therapeutic Procedure/Exercise   Ther. Procedure: strength, endurance, ROM, flexibillity Minutes (06798) 15   Symptoms Noted During/After Treatment increased pain   Treatment Detail/Skilled Intervention Supine R TKA HEP following printed handout x10 reps each: ankle pumps, quad sets, glute sets, hamstring sets, heel slides, SAQ, hip abd, and SLR. Instructed to complete 2x/day.   Gait Training   Gait Training Minutes (16252) 15   Symptoms Noted During/After Treatment (Gait Training) fatigue;increased pain   Treatment Detail/Skilled Intervention Amb x100 feet wtih 2WW and Reginaldo, cues for step through pattern with good carryover. Pain tolerable with ambulation, good stability throughout. Up/down 5 stairs with HRA and SBA, able to complete with proper sequencing, instructed to have spouse assist in walker management up/down stairs.   [place] Level (Gait Training) stand-by assist   Physical Assistance Level (Gait Training) verbal cues   Weight Bearing (Gait Training) weight-bearing as tolerated   Assistive Device (Gait Training) standard walker   Gait Analysis Deviations decreased kailyn;decreased stride length;decreased weight-shifting ability   Impairments (Gait Analysis/Training) pain;ROM decreased;strength decreased   Stair Railings present at both sides   Physical Assist/Nonphysical Assist (Stairs) verbal cues   Level of [place] (Stairs) stand-by assist   PT Discharge Planning   PT Plan d/c PT, goals met   PT Discharge Recommendation  (DC Rec) home with outpatient physical therapy   PT Rationale for DC Rec would benefit from OP PT to progress R TKA aftercares   PT Brief overview of current status amb 100 feet with 2WW and Reginaldo, up/down 5 stairs with SBA   Total Session Time   Timed Code Treatment Minutes 30   Total Session Time (sum of timed and untimed services) 40

## 2023-02-24 NOTE — CONSULTS
[facility] Medicine Progress Note  Date of Service: 02/24/2023    Assessment & Plan   Camelia Bangura is a 78 year old female who presented on 2/23/2023 for scheduled Procedure(s):  TOTAL Knee Arthroplasty, Right by Charlie Escamilla MD and is being followed by the hospital medicine service for co-management of acute and/or chronic perioperative medical problems.      S/p Procedure(s):  TOTAL Knee Arthroplasty, Right   1 Day Post-Op    - pain control, wound cares, physical therapy, occupational therapy and DVT prophylaxis per orthopedic surgery service    Active Problems:    * No active hospital problems. *      DVT Prophylaxis: as per orthopedic surgery service   Code Status: Full Code    Lines: None  Rowe catheter: None    Discussion: Medically, the patient appears stable for discharge    Disposition: Anticipate discharge 2/24/2023    Attestation:  I have reviewed today's vital signs, notes, medications, labs and imaging.    KRISTIN PRESTON CNP       Interval History   There were no acute events overnight.      Physical Exam   Temp:  [97.7  F (36.5  C)-98.4  F (36.9  C)] 98.2  F (36.8  C)  Pulse:  [62-85] 67  Resp:  [8-22] 18  BP: (108-134)/(47-70) 108/47  SpO2:  [87 %-99 %] 97 %    Weights:   Vitals:    02/23/23 1038   Weight: 67.6 kg (149 lb)    Body mass index is 29.1 kg/m .    General: Alert and pleasant sitting up eating breakfast.  In no distress.  CV: RRR, 2+ pulses BLE & BUE, no edema noted BLE  Respiratory: CTA bilaterally  GI: Soft, nontender  Skin: Warm and dry  Musculoskeletal: Limited exam of right leg.  No other deficiencies noted.  Neurological: Oriented x3, cranial nerves appear intact.    Data   Recent Labs   Lab 02/24/23  0530 02/24/23  0412   HGB  --  11.2*   *  --        Recent Labs   Lab 02/24/23  0530   *        Unresulted Labs Ordered in the Past 30 Days of this Admission     No orders found for last 31 day(s).            Imaging  Recent Results (from the past 24 hour(s))   POC US Guidance Needle Placement    Impression    Nerve and structures intact, see image     XR Knee Port Right 1/2 Views    Narrative    EXAM:  KNEE PORTABLE  RIGHT ONE TO TWO VIEWS   DATE/TIME: 2/23/2023 3:18 PM    INDICATION: Post-Op Total Knee  COMPARISON: 1/14/2021      Impression    IMPRESSION: Interval placement right total knee arthroplasty with  patellar resurfacing. The components project in expected position  without displaced periprosthetic fracture. Expected postoperative soft  tissue and intra-articular gas. Knee soft tissue swelling. Extensor  tendon enthesopathy at the patella.    DONTEA MADRID MD         SYSTEM ID:  [id number]        I reviewed all new labs and imaging results over the last 24 hours. I personally reviewed the Right knee x-ray image(s) showing Surgical changes of right total knee arthroplasty.    Medications     lactated ringers Stopped (02/23/23 2300)       acetaminophen  975 mg Oral Q8H     aspirin  325 mg Oral Daily     ketorolac  15 mg Intravenous Q6H     polyethylene glycol  17 g Oral Daily     senna-docusate  1 tablet Oral BID     sodium chloride (PF)  3 mL Intracatheter Q8H     traZODone  25 mg Oral At Bedtime       KRISTIN PRESTON CNP

## 2023-02-24 NOTE — PROGRESS NOTES
[facility] DISCHARGE NOTE    Patient discharged to home at 10:38 AM via wheel chair. Accompanied by spouse and staff. Discharge instructions reviewed with patient and spouse, opportunity offered to ask questions. Prescriptions sent to patients preferred pharmacy. All belongings sent with patient.    YOLY KATE RN

## 2023-02-24 NOTE — PLAN OF CARE
Physical Therapy Discharge Summary    Reason for therapy discharge:    All goals and outcomes met, no further needs identified.    Progress towards therapy goal(s). See goals on Care Plan in [facility] electronic health record for goal details.  Goals met    Therapy recommendation(s):    Continued therapy is recommended.  Rationale/Recommendations:  Recommend continued OP PT to progress R TKA aftercares.

## 2023-02-24 NOTE — CONSULTS
Care Management Note:    Care Management team received referral from Ortho team to assist pt with discharge planning services post surgical services.    Per IDT rounds, EMR review, and/or discussion with PT/OT staff, it has been determined that pt will discharge to home and attend outpatient therapy services.    Care Management will close referral at this time.    Chaya Chowdhury, [facility]  Inpatient Care Coordinator   [facility] [phone]  [facility] [phone]

## 2023-02-24 NOTE — PROGRESS NOTES
[facility]      OUTPATIENT PHYSICAL THERAPY EVALUATION  PLAN OF TREATMENT FOR OUTPATIENT REHABILITATION  (COMPLETE FOR INITIAL CLAIMS ONLY)  Patient's Last Name, First Name, M.I.  YOB: 1945  Camelia Bangura                        Provider's Name  [facility] Medical Record No.  [id number]                               Onset Date:  02/23/23   Start of Care Date:  02/24/23      Type:     _X_PT   ___OT   ___SLP Medical Diagnosis:  s/p R TKA                        PT Diagnosis:  impaired functional mobility s/p R TKA   Visits from SOC:  1   _________________________________________________________________________________  Plan of Treatment/Functional Goals    Planned Interventions: balance training, bed mobility training, cryotherapy, gait training, home exercise program, patient/family education, ROM (range of motion), stair training, strengthening, transfer training     Goals: See Physical Therapy Goals on Care Plan in [employer] electronic health record.    Therapy Frequency: One time eval and treatment only  Predicted Duration of Therapy Intervention: 02/24/23  _________________________________________________________________________________    I CERTIFY THE NEED FOR THESE SERVICES FURNISHED UNDER        THIS PLAN OF TREATMENT AND WHILE UNDER MY CARE     (Physician co-signature of this document indicates review and certification of the therapy plan).                Certification date from: 02/24/23, Certification date to: 02/24/23    Referring Physician: Charlie Escamilla MD            Initial Assessment        See Physical Therapy evaluation dated 02/24/23 in Epic electronic health record.

## 2023-02-24 NOTE — PROGRESS NOTES
[facility] ADMISSION NOTE    Patient admitted to room 2215 at approximately 1700 via cart from surgery. Patient was accompanied by spouse.     Verbal SBAR report received from Niurka CURTIS   prior to patient arrival.     Patient trasferred to bed via air angela. Patient alert and oriented X 3. Pain is controlled without any medications.  . Admission vital signs: Blood pressure 129/59, pulse 78, temperature 97.7  F (36.5  C), temperature source Oral, resp. rate 12, height 1.524 m (5'), weight 67.6 kg (149 lb), SpO2 96 %, not currently breastfeeding. Patient was oriented to plan of care, call light, bed controls, tv, telephone, bathroom and visiting hours.     Risk Assessment    The following safety risks were identified during admission: fall and skin. Yellow risk band applied: YES.     Skin Initial Assessment    This writer admitted this patient and completed a full skin assessment and Felix score in the Adult PCS flowsheet. Appropriate interventions initiated as needed.     Secondary skin check completed by Kimmie CURTIS.         Education    Patient has a [place] to Observation order: Yes  Observation education completed and documented: Yes      Rocío Andrade, RN

## 2023-02-24 NOTE — PROGRESS NOTES
[facility] Orthopaedics Progress Note      Post-operative Day: 1 Day Post-Op    Procedure(s):  TOTAL Knee Arthroplasty, Right  Subjective:    Pt reports that she is doing well; she just saw therapy and was able to do the stairs and walk. Her pain is controlled and she has outpatient physical therapy set up. She hopes to go home soon.    Chest pain, SOB:  No  Nausea, vomiting:  No  Lightheadedness, dizziness:  No  Neuro:  Patient denies new onset numbness or paresthesias      Objective:  Blood pressure 108/47, pulse 67, temperature 98.2  F (36.8  C), temperature source Oral, resp. rate 18, height 1.524 m (5'), weight 67.6 kg (149 lb), SpO2 97 %, not currently breastfeeding.    Patient Vitals for the past 24 hrs:   BP Temp Temp src Pulse Resp SpO2 Height Weight   02/24/23 0623 108/47 98.2  F (36.8  C) Oral 67 18 97 % -- --   02/24/23 0311 116/49 98.1  F (36.7  C) Oral 80 18 99 % -- --   02/23/23 2254 119/61 97.7  F (36.5  C) Oral 68 17 97 % -- --   02/23/23 1921 134/59 -- -- 62 12 97 % -- --   02/23/23 1830 -- -- -- -- -- 95 % -- --   02/23/23 1815 -- -- -- -- -- 97 % -- --   02/23/23 1700 129/59 97.7  F (36.5  C) Oral 78 12 96 % -- --   02/23/23 1615 113/59 -- -- 71 (!) 9 95 % -- --   02/23/23 1600 124/63 97.7  F (36.5  C) Temporal 77 15 95 % -- --   02/23/23 1545 131/63 -- -- 80 19 98 % -- --   02/23/23 1530 134/66 -- -- 80 (!) 8 (!) 87 % -- --   02/23/23 1515 134/70 98.4  F (36.9  C) Temporal 82 (!) 9 99 % -- --   02/23/23 1500 126/56 -- -- 85 11 97 % -- --   02/23/23 1445 121/65 -- -- 64 15 99 % -- --   02/23/23 1432 126/59 98.4  F (36.9  C) Temporal 74 22 98 % -- --   02/23/23 1038 128/60 97.9  F (36.6  C) Oral 84 16 99 % 1.524 m (5') 67.6 kg (149 lb)       Wt Readings from Last 4 Encounters:   02/23/23 67.6 kg (149 lb)   02/08/23 67.6 kg (149 lb)   02/09/22 67.6 kg (149 lb)   07/21/21 67.6 kg (149 lb)       Gen: A&O x 3. NAD. Appears tired, up to the recliner.  Wound status: Covered, Ace wrap in place.    Circulation, motion and sensation: Dorsiflexion/plantarflexion intact and equal bilaterally; distal lower extremity sensation is intact and equal bilaterally. Foot and toes are warm and well perfused.    Swelling: Mild  Calf tenderness: calves are soft and non-tender bilaterally     Pertinent Labs   Lab Results: personally reviewed.     Recent Labs   Lab Test 02/24/23  0412 10/10/22  0949 02/22/21  0920 06/07/16  2305   HGB 11.2*  --   --  13.8   HCT  --   --   --  41.4   MCV  --   --   --  90   PLT  --   --   --  216   NA  --  139 139 139       Plan:   Continue current cares and rehabilitation.  Anticoagulation protocol: ASA 325mg daily  x 30  days  Pain medications: oxycodone, toradol, tylenol and vistaril  Weight bearing status:  WBAT  Disposition:  Plan for discharge to home with outpatient therapy when medically stable and pain is controlled, cleared by therapy. Later today.              Report completed by:  Gama Sheikh PA-C  Date: 2/24/2023  Time: 9:25 AM

## 2023-02-24 NOTE — PLAN OF CARE
Patient vital signs are at baseline: Yes  Patient able to ambulate as they were prior to admission or with assist devices provided by therapies during their stay:  Yes  Patient MUST void prior to discharge:  Yes  Patient able to tolerate oral intake:  Yes  Pain has adequate pain control using Oral analgesics:  Yes - pain managed with ice, PRN atarax, scheduled toradol and tylenol.

## 2023-02-28 ENCOUNTER — HOSPITAL ENCOUNTER (OUTPATIENT)
Dept: PHYSICAL THERAPY | Facility: CLINIC | Age: 78
Setting detail: THERAPIES SERIES
Discharge: HOME | End: 2023-02-28
Attending: INTERNAL MEDICINE
Payer: COMMERCIAL

## 2023-02-28 ENCOUNTER — DOCUMENTATION ONLY (OUTPATIENT)
Dept: OTHER | Facility: CLINIC | Age: 78
End: 2023-02-28
Payer: COMMERCIAL

## 2023-02-28 PROCEDURE — 97110 THERAPEUTIC EXERCISES: CPT | Mod: GP | Performed by: PHYSICAL THERAPIST

## 2023-02-28 PROCEDURE — 97161 PT EVAL LOW COMPLEX 20 MIN: CPT | Mod: GP | Performed by: PHYSICAL THERAPIST

## 2023-02-28 NOTE — PROGRESS NOTES
[facility]    OUTPATIENT PHYSICAL THERAPY ORTHOPEDIC EVALUATION  PLAN OF TREATMENT FOR OUTPATIENT REHABILITATION  (COMPLETE FOR INITIAL CLAIMS ONLY)  Patient's Last Name, First Name, M.I.  YOB: 1945  Andreina Banguramary  ESTELA    Provider s Name:  [facility]   Medical Record No.  [id number]   Start of Care Date:  02/28/23   Onset Date:  02/23/23 (DOS)   Type:     _X__PT   ___OT   ___SLP Medical Diagnosis:  R Total knee arthroplasty     PT Diagnosis:  Right total knee arthroplasty   Visits from SOC:  1      _________________________________________________________________________________  Plan of Treatment/Functional Goals:  stretching, strengthening, ROM, neuromuscular re-education, manual therapy, joint mobilization     Electrical stimulation     Goals  Goal Identifier: 1  Goal Description: Patient will be independent and compliant with HEP to promote return to PLOF  Target Date: 03/21/23    Goal Identifier: 2  Goal Description: Patient will be able to complete supine<>standing bed transfer independently without use of AD  Target Date: 03/28/23    Goal Identifier: 3  Goal Description: Patient will be able to ambulate 20 minutes without AD, 3/10 knee pain at worst  Target Date: 04/25/23      Therapy Frequency:  2 times/Week  Predicted Duration of Therapy Intervention:  4 weeks then 1x/week for 1 month    Loco Richardson, PT                 I CERTIFY THE NEED FOR THESE SERVICES FURNISHED UNDER        THIS PLAN OF TREATMENT AND WHILE UNDER MY CARE .             Physician Signature               Date    X_____________________________________________________                         Certification Date From:  02/28/23   Certification Date To:  04/25/23    Referring Provider:  Dr. Charlie Escamilla    Initial Assessment        See Epic Evaluation Start of Care Date: 02/28/23

## 2023-03-03 ENCOUNTER — HOSPITAL ENCOUNTER (OUTPATIENT)
Dept: PHYSICAL THERAPY | Facility: CLINIC | Age: 78
Setting detail: THERAPIES SERIES
Discharge: HOME | End: 2023-03-03
Attending: INTERNAL MEDICINE
Payer: COMMERCIAL

## 2023-03-03 PROCEDURE — 97110 THERAPEUTIC EXERCISES: CPT | Mod: GP | Performed by: PHYSICAL THERAPIST

## 2023-03-07 ENCOUNTER — HOSPITAL ENCOUNTER (OUTPATIENT)
Dept: PHYSICAL THERAPY | Facility: CLINIC | Age: 78
Setting detail: THERAPIES SERIES
Discharge: HOME | End: 2023-03-07
Attending: ORTHOPAEDIC SURGERY
Payer: COMMERCIAL

## 2023-03-07 PROCEDURE — 97110 THERAPEUTIC EXERCISES: CPT | Mod: GP | Performed by: PHYSICAL THERAPIST

## 2023-03-09 ENCOUNTER — TRANSFERRED RECORDS (OUTPATIENT)
Dept: HEALTH INFORMATION MANAGEMENT | Facility: CLINIC | Age: 78
End: 2023-03-09
Payer: COMMERCIAL

## 2023-03-10 ENCOUNTER — HOSPITAL ENCOUNTER (OUTPATIENT)
Dept: PHYSICAL THERAPY | Facility: CLINIC | Age: 78
Setting detail: THERAPIES SERIES
Discharge: HOME | End: 2023-03-10
Attending: ORTHOPAEDIC SURGERY
Payer: COMMERCIAL

## 2023-03-10 PROCEDURE — 97110 THERAPEUTIC EXERCISES: CPT | Mod: GP | Performed by: PHYSICAL THERAPIST

## 2023-03-13 ENCOUNTER — HOSPITAL ENCOUNTER (OUTPATIENT)
Dept: PHYSICAL THERAPY | Facility: CLINIC | Age: 78
Setting detail: THERAPIES SERIES
Discharge: HOME | End: 2023-03-13
Attending: ORTHOPAEDIC SURGERY
Payer: COMMERCIAL

## 2023-03-13 PROCEDURE — 97110 THERAPEUTIC EXERCISES: CPT | Mod: GP | Performed by: PHYSICAL THERAPIST

## 2023-03-16 ENCOUNTER — HOSPITAL ENCOUNTER (OUTPATIENT)
Dept: PHYSICAL THERAPY | Facility: CLINIC | Age: 78
Setting detail: THERAPIES SERIES
Discharge: HOME | End: 2023-03-16
Attending: ORTHOPAEDIC SURGERY
Payer: COMMERCIAL

## 2023-03-16 PROCEDURE — 97110 THERAPEUTIC EXERCISES: CPT | Mod: GP | Performed by: PHYSICAL THERAPIST

## 2023-03-20 ENCOUNTER — HOSPITAL ENCOUNTER (OUTPATIENT)
Dept: PHYSICAL THERAPY | Facility: CLINIC | Age: 78
Setting detail: THERAPIES SERIES
Discharge: HOME | End: 2023-03-20
Attending: ORTHOPAEDIC SURGERY
Payer: COMMERCIAL

## 2023-03-20 PROCEDURE — 97110 THERAPEUTIC EXERCISES: CPT | Mod: GP | Performed by: PHYSICAL THERAPIST

## 2023-03-23 ENCOUNTER — HOSPITAL ENCOUNTER (OUTPATIENT)
Dept: PHYSICAL THERAPY | Facility: CLINIC | Age: 78
Setting detail: THERAPIES SERIES
Discharge: HOME | End: 2023-03-23
Attending: ORTHOPAEDIC SURGERY
Payer: COMMERCIAL

## 2023-03-23 PROCEDURE — 97110 THERAPEUTIC EXERCISES: CPT | Mod: GP | Performed by: PHYSICAL THERAPIST

## 2023-03-27 ENCOUNTER — HOSPITAL ENCOUNTER (OUTPATIENT)
Dept: PHYSICAL THERAPY | Facility: CLINIC | Age: 78
Setting detail: THERAPIES SERIES
Discharge: HOME | End: 2023-03-27
Attending: ORTHOPAEDIC SURGERY
Payer: COMMERCIAL

## 2023-03-27 PROCEDURE — 97110 THERAPEUTIC EXERCISES: CPT | Mod: GP | Performed by: PHYSICAL THERAPIST

## 2023-03-30 ENCOUNTER — HOSPITAL ENCOUNTER (OUTPATIENT)
Dept: PHYSICAL THERAPY | Facility: CLINIC | Age: 78
Setting detail: THERAPIES SERIES
Discharge: HOME | End: 2023-03-30
Attending: ORTHOPAEDIC SURGERY
Payer: COMMERCIAL

## 2023-03-30 PROCEDURE — 97110 THERAPEUTIC EXERCISES: CPT | Mod: GP | Performed by: PHYSICAL THERAPIST

## 2023-04-06 ENCOUNTER — HOSPITAL ENCOUNTER (OUTPATIENT)
Dept: PHYSICAL THERAPY | Facility: CLINIC | Age: 78
Setting detail: THERAPIES SERIES
Discharge: HOME | End: 2023-04-06
Attending: ORTHOPAEDIC SURGERY
Payer: COMMERCIAL

## 2023-04-06 PROCEDURE — 97110 THERAPEUTIC EXERCISES: CPT | Mod: GP | Performed by: PHYSICAL THERAPIST

## 2023-04-09 ENCOUNTER — HEALTH MAINTENANCE LETTER (OUTPATIENT)
Age: 78
End: 2023-04-09

## 2023-04-13 ENCOUNTER — HOSPITAL ENCOUNTER (OUTPATIENT)
Dept: PHYSICAL THERAPY | Facility: CLINIC | Age: 78
Setting detail: THERAPIES SERIES
Discharge: HOME | End: 2023-04-13
Attending: ORTHOPAEDIC SURGERY
Payer: COMMERCIAL

## 2023-04-13 PROCEDURE — 97110 THERAPEUTIC EXERCISES: CPT | Mod: GP | Performed by: PHYSICAL THERAPIST

## 2023-04-13 NOTE — PROGRESS NOTES
[facility]    Outpatient Physical Therapy Discharge Note  Patient: Camelia Bangura  : 1945    Beginning/End Dates of Reporting Period:  23 to 23    Referring Provider: Dr. Charlie Escamilla    Therapy Diagnosis: Right total knee arthroplasty     Client Self Report: She is 7 weeks out at this time and is having some difficulty with stepdowns on the stairs. Some increase in swelling to the knee with pushing her flexion at home.    Objective Measurements:  Objective Measure: Fair QS,  SLR good (-)  Details: R KE AROM lacks 2*  Objective Measure: R KF on stairs- 105*     Goals:  Goal Identifier 1   Goal Description Patient will be independent and compliant with HEP to promote return to PLOF   Target Date 23   Date Met  23   Progress (detail required for progress note):       Goal Identifier 2   Goal Description Patient will be able to complete supine<>standing bed transfer independently without use of AD   Target Date 23   Date Met  23   Progress (detail required for progress note):       Goal Identifier 3   Goal Description Patient will be able to ambulate 20 minutes without AD, 3/10 knee pain at worst   Target Date 23   Date Met  23   Progress (detail required for progress note): pt went for walks throughout the weeks       Plan:  Discharge from therapy.    Discharge:    Reason for Discharge: Patient has met all goals.    Equipment Issued: none    Discharge Plan: Patient to continue home program.

## 2023-04-18 ENCOUNTER — TRANSFERRED RECORDS (OUTPATIENT)
Dept: HEALTH INFORMATION MANAGEMENT | Facility: CLINIC | Age: 78
End: 2023-04-18
Payer: COMMERCIAL

## 2023-05-08 ENCOUNTER — OFFICE VISIT (OUTPATIENT)
Dept: OBGYN | Facility: CLINIC | Age: 78
End: 2023-05-08
Payer: COMMERCIAL

## 2023-05-08 VITALS
TEMPERATURE: 97.9 F | DIASTOLIC BLOOD PRESSURE: 66 MMHG | HEART RATE: 75 BPM | BODY MASS INDEX: 29.1 KG/M2 | SYSTOLIC BLOOD PRESSURE: 127 MMHG | RESPIRATION RATE: 16 BRPM | HEIGHT: 60 IN

## 2023-05-08 DIAGNOSIS — N81.11 MIDLINE CYSTOCELE: Primary | ICD-10-CM

## 2023-05-08 PROCEDURE — 57160 INSERT PESSARY/OTHER DEVICE: CPT | Performed by: OBSTETRICS & GYNECOLOGY

## 2023-05-08 PROCEDURE — A4561 PESSARY RUBBER, ANY TYPE: HCPCS | Performed by: OBSTETRICS & GYNECOLOGY

## 2023-05-08 PROCEDURE — 99213 OFFICE O/P EST LOW 20 MIN: CPT | Mod: 25 | Performed by: OBSTETRICS & GYNECOLOGY

## 2023-05-08 ASSESSMENT — PATIENT HEALTH QUESTIONNAIRE - PHQ9: SUM OF ALL RESPONSES TO PHQ QUESTIONS 1-9: 1

## 2023-05-08 NOTE — PROGRESS NOTES
"    Chief Complaint   Patient presents with     Consult     Bladder prolapse         HPI:  Camelia Bangura, 78 year old,  presents with complaints of \"a  pink, bald baby head\" in the vagina.  She feels her bladder is prolapsing more and is bothersome.  No urinary leakage or incontinence.        Past Medical History:   Diagnosis Date     Arthritis      Contact dermatitis and other eczema, due to unspecified cause      Dysplasia of cervix, unspecified      Past Surgical History:   Procedure Laterality Date     ARTHROPLASTY KNEE Right 2023    Procedure: TOTAL Knee Arthroplasty, Right;  Surgeon: Charlie Escamilla MD;  Location: [facility] OR     HC CORRECT BUNION,SIMPLE  1990    Right foot     HC REMOVAL OF TONSILS,<13 Y/O       HYSTERECTOMY, PAP NO LONGER INDICATED  2005     HYSTERECTOMY, VAGINAL  2005     PHACOEMULSIFICATION WITH STANDARD INTRAOCULAR LENS IMPLANT Right 2021    Procedure: Right eye Cataract Extraction with Implant;  Surgeon: Alexander Mednoza MD;  Location: [facility] OR     PHACOEMULSIFICATION WITH STANDARD INTRAOCULAR LENS IMPLANT Left 2021    Procedure: Cataract Extraction with Implant, left eye;  Surgeon: Alexander Mendoza MD;  Location: [facility] OR     SURGICAL HISTORY OF -   1995    Bunion deformity & fractured tibial sesamoid left foot     ZZC APPENDECTOMY       Social History     Socioeconomic History     Marital status:      Spouse name: Not on file     Number of children: Not on file     Years of education: Not on file     Highest education level: Not on file   Occupational History     Not on file   Tobacco Use     Smoking status: Never     Smokeless tobacco: Never   Vaping Use     Vaping status: Never Used   Substance and Sexual Activity     Alcohol use: Yes     Comment: occasional wine     Drug use: No     Sexual activity: Yes     Partners: Male     Birth control/protection: Surgical     Comment: hysterectomy   Other Topics " Concern     Parent/sibling w/ CABG, MI or angioplasty before 65F 55M? No   Social History Narrative     Not on file     Social Determinants of Health     Financial Resource Strain: Not on file   Food Insecurity: Not on file   Transportation Needs: Not on file   Physical Activity: Not on file   Stress: Not on file   Social Connections: Not on file   Intimate Partner Violence: Not on file   Housing Stability: Not on file     Family History   Problem Relation Age of Onset     Cerebrovascular Disease Mother      Arthritis Mother      Osteoporosis Mother      Melanoma No family hx of         Current Outpatient Medications   Medication Sig Dispense Refill     acetaminophen (TYLENOL) 325 MG tablet Take 2 tablets (650 mg) by mouth every 4 hours as needed for other (mild pain) (Patient not taking: Reported on 5/8/2023) 100 tablet 0     estradiol (ESTRACE) 0.5 MG tablet Place 1 tablet in the vagina, before bed. (Patient not taking: Reported on 5/8/2023) 30 tablet 3     Probiotic Product (PROBIOTIC PO)  (Patient not taking: Reported on 5/8/2023)       simvastatin (ZOCOR) 40 MG tablet Take 1 tablet (40 mg) by mouth At Bedtime (Patient not taking: Reported on 5/8/2023) 90 tablet 3     traZODone (DESYREL) 50 MG tablet Take 0.5 tablets (25 mg) by mouth At Bedtime (Patient not taking: Reported on 5/8/2023) 90 tablet 0     triamcinolone (KENALOG) 0.1 % external cream Apply  topically 2 times daily. (Patient not taking: Reported on 5/8/2023) 30 g 11        Allergies:     Allergies   Allergen Reactions     Seasonal Allergies         ROS:  See HPI      Physical Exam:  /66 (BP Location: Left arm, Patient Position: Chair, Cuff Size: Adult Regular)   Pulse 75   Temp 97.9  F (36.6  C) (Tympanic)   Resp 16   Ht 1.524 m (5')   BMI 29.10 kg/m       Appearance: well developed, well nourished, no acute distress  Head Exam normocephalic, no lesions or deformities  Abdomen: soft, non-tender, no masses, no organomegaly, no  hernia,  Skin: no lesions  Extremities: no edema  Psych: orientated x3    Genitourinary Exam  Vulva: normal, no lesions  Urethral meatus: normal size and location, no lesions or discharge  Urethra: no tenderness or masses  Bladder: no fullness or tenderness  Vagina: grade 3 cystocele, minimal rectocele, limited vault descent with valsalva  Cervix: surgically absent  Uterus: surgically absent  Adnexa: no palpable masses bilaterally      Procedure: Pessary Fitting  Indication:    Encounter Diagnosis   Name Primary?     Midline cystocele Yes           The risks and benefits were discussed.  The patient was placed in the dorsal-lithotomy position.  Exam revealed the above findings.  A size 2 1/2 in ring pessary was initially tried.  She was allowed to ambulate and void. She did tolerate the initial pessary.  She did well with the initial pessary and was discharged with one.  She is to return in 2 weeks for recheck.  She was advised to return sooner if she is having any discomfort, bleeding or concerns.    Assessment/Plan:  Encounter Diagnosis   Name Primary?     Midline cystocele Yes         Discussed prolapse and reviewed pictures.  Discussed options including no treatment, splinting, pessaries and surgery with and without mesh.  Discussed risks and benefits of each.  After discussion, she decided to try a pessary today.  She may consider surgery in the future.  We discussed surgical options and if she does plan surgery, would plan an anterior colporrhaphy with possible sacrospinous ligament fixation.          Becky Healy MD on 5/8/2023 at 12:02 PM

## 2023-05-08 NOTE — NURSING NOTE
Initial /66 (BP Location: Left arm, Patient Position: Chair, Cuff Size: Adult Regular)   Pulse 75   Temp 97.9  F (36.6  C) (Tympanic)   Resp 16   Ht 1.524 m (5')   BMI 29.10 kg/m   Estimated body mass index is 29.1 kg/m  as calculated from the following:    Height as of this encounter: 1.524 m (5').    Weight as of 2/23/23: 67.6 kg (149 lb). .    Thea Garcia, CMA

## 2023-05-12 ENCOUNTER — OFFICE VISIT (OUTPATIENT)
Dept: OBGYN | Facility: CLINIC | Age: 78
End: 2023-05-12
Payer: COMMERCIAL

## 2023-05-12 ENCOUNTER — TELEPHONE (OUTPATIENT)
Dept: OBGYN | Facility: CLINIC | Age: 78
End: 2023-05-12

## 2023-05-12 VITALS
RESPIRATION RATE: 16 BRPM | HEART RATE: 71 BPM | HEIGHT: 60 IN | SYSTOLIC BLOOD PRESSURE: 123 MMHG | BODY MASS INDEX: 29.1 KG/M2 | DIASTOLIC BLOOD PRESSURE: 60 MMHG | TEMPERATURE: 97.9 F

## 2023-05-12 DIAGNOSIS — N81.11 MIDLINE CYSTOCELE: Primary | ICD-10-CM

## 2023-05-12 PROCEDURE — A4561 PESSARY RUBBER, ANY TYPE: HCPCS | Performed by: OBSTETRICS & GYNECOLOGY

## 2023-05-12 PROCEDURE — 57160 INSERT PESSARY/OTHER DEVICE: CPT | Performed by: OBSTETRICS & GYNECOLOGY

## 2023-05-12 NOTE — NURSING NOTE
Initial /60 (BP Location: Left arm, Patient Position: Chair, Cuff Size: Adult Regular)   Pulse 71   Temp 97.9  F (36.6  C) (Tympanic)   Resp 16   Ht 1.524 m (5')   BMI 29.10 kg/m   Estimated body mass index is 29.1 kg/m  as calculated from the following:    Height as of this encounter: 1.524 m (5').    Weight as of 2/23/23: 67.6 kg (149 lb). .    Thea Garcia, CMA

## 2023-05-12 NOTE — PROGRESS NOTES
Camelia Bangura is here for a pessary fitting.  She had a 2/1/2 in ring that fell out after raking leaves.  She tried to replace it, but it fell out again.      Procedure: Pessary Fitting  Indication:    Encounter Diagnosis   Name Primary?     Midline cystocele Yes           The risks and benefits were discussed.  The patient was placed in the dorsal-lithotomy position.  Exam revealed the above findings.  A 2 3/4 in ring pessary was initially tried.  She was allowed to ambulate and void. She did tolerate the initial pessary.  A 2 1/2 in donut pessary was also tried today. She did well with the donut pessary and was discharged with one. She felt the donut was just a little more comfortable.  She is to return in 2 weeks for recheck.  She was advised to return sooner if she is having any discomfort, bleeding or concerns.    Becky Healy MD on 5/12/2023 at 9:27 AM

## 2023-06-01 ENCOUNTER — OFFICE VISIT (OUTPATIENT)
Dept: OBGYN | Facility: CLINIC | Age: 78
End: 2023-06-01
Payer: COMMERCIAL

## 2023-06-01 VITALS
SYSTOLIC BLOOD PRESSURE: 113 MMHG | RESPIRATION RATE: 16 BRPM | HEIGHT: 60 IN | DIASTOLIC BLOOD PRESSURE: 53 MMHG | HEART RATE: 73 BPM | BODY MASS INDEX: 29.1 KG/M2 | TEMPERATURE: 97.9 F

## 2023-06-01 DIAGNOSIS — N81.11 MIDLINE CYSTOCELE: Primary | ICD-10-CM

## 2023-06-01 PROCEDURE — A4561 PESSARY RUBBER, ANY TYPE: HCPCS | Performed by: OBSTETRICS & GYNECOLOGY

## 2023-06-01 PROCEDURE — 57160 INSERT PESSARY/OTHER DEVICE: CPT | Performed by: OBSTETRICS & GYNECOLOGY

## 2023-06-01 PROCEDURE — 99213 OFFICE O/P EST LOW 20 MIN: CPT | Mod: 25 | Performed by: OBSTETRICS & GYNECOLOGY

## 2023-06-01 NOTE — PROGRESS NOTES
Chief Complaint   Patient presents with     Follow Up     Pessary fell out same day as placement         HPI:  Camelia Bangura, 78 year old,  presents for follow up of prolapse.  Has questions about not treating.  Is doing a home remodel and moving, so surgery is not an option now.    Current Outpatient Medications   Medication     acetaminophen (TYLENOL) 325 MG tablet     estradiol (ESTRACE) 0.5 MG tablet     Probiotic Product (PROBIOTIC PO)     simvastatin (ZOCOR) 40 MG tablet     traZODone (DESYREL) 50 MG tablet     triamcinolone (KENALOG) 0.1 % external cream     No current facility-administered medications for this visit.            Allergies   Allergen Reactions     Seasonal Allergies         /53 (BP Location: Left arm, Patient Position: Chair, Cuff Size: Adult Regular)   Pulse 73   Temp 97.9  F (36.6  C) (Tympanic)   Resp 16   Ht 1.524 m (5')   BMI 29.10 kg/m      Procedure: Pessary Fitting  Indication:    Encounter Diagnosis   Name Primary?     Midline cystocele Yes           The risks and benefits were discussed.  The patient was placed in the dorsal-lithotomy position.  Exam revealed the above findings.  A size 2 3/4 in gellhorn pessary was initially tried.  She was allowed to ambulate and void. She tolerated the initial pessary.  She was discharged with one.  She is to return in 2-4 weeks for recheck.  She was advised to return sooner if she is having any discomfort, bleeding or concerns.    Assessment:  Encounter Diagnosis   Name Primary?     Midline cystocele Yes           Plan:  1. We reviewed surgery versus non-surgical options.  She decided to try another pessary. She was fitted for a gellhorn today.  Follow up in 1 month.          Becky Healy MD ....................  2023    9:40 AM

## 2023-06-01 NOTE — NURSING NOTE
Initial /53 (BP Location: Left arm, Patient Position: Chair, Cuff Size: Adult Regular)   Pulse 73   Temp 97.9  F (36.6  C) (Tympanic)   Resp 16   Ht 1.524 m (5')   BMI 29.10 kg/m   Estimated body mass index is 29.1 kg/m  as calculated from the following:    Height as of this encounter: 1.524 m (5').    Weight as of 2/23/23: 67.6 kg (149 lb). .    Thea Garcia, CMA

## 2023-06-28 ENCOUNTER — OFFICE VISIT (OUTPATIENT)
Dept: OBGYN | Facility: CLINIC | Age: 78
End: 2023-06-28
Payer: COMMERCIAL

## 2023-06-28 VITALS
HEART RATE: 80 BPM | DIASTOLIC BLOOD PRESSURE: 73 MMHG | RESPIRATION RATE: 16 BRPM | SYSTOLIC BLOOD PRESSURE: 129 MMHG | HEIGHT: 60 IN | BODY MASS INDEX: 29.1 KG/M2 | TEMPERATURE: 98.6 F

## 2023-06-28 DIAGNOSIS — N81.11 MIDLINE CYSTOCELE: Primary | ICD-10-CM

## 2023-06-28 PROCEDURE — 99212 OFFICE O/P EST SF 10 MIN: CPT | Performed by: OBSTETRICS & GYNECOLOGY

## 2023-06-28 NOTE — NURSING NOTE
Initial /73 (BP Location: Right arm, Patient Position: Chair, Cuff Size: Adult Regular)   Pulse 80   Temp 98.6  F (37  C) (Tympanic)   Resp 16   Ht 1.524 m (5')   BMI 29.10 kg/m   Estimated body mass index is 29.1 kg/m  as calculated from the following:    Height as of this encounter: 1.524 m (5').    Weight as of 2/23/23: 67.6 kg (149 lb). .    Thea Garcia, CMA

## 2023-06-28 NOTE — PROGRESS NOTES
Pessary follow-up visit.    Camelia Bangura is not complaining of any problems with the pessary. She is here for it to be removed, cleaned and reinserted. She is under a lot of stress today and would prefer to skip the exam part.  She has no bleeding or pain. When she had the fitting pessary removed, she had a lot of pain and doesn't feel she is up for that today.  She is in the process of moving and is waking up at 3 am and not sleeping well.  She is working long hours.      /73 (BP Location: Right arm, Patient Position: Chair, Cuff Size: Adult Regular)   Pulse 80   Temp 98.6  F (37  C) (Tympanic)   Resp 16   Ht 1.524 m (5')   BMI 29.10 kg/m       EXAM:    General Appearance: Pleasant, alert, appropriate appearance for age. No acute distress  Head Exam: Normocephalic, without obvious abnormality.  Exam deferred per patient request.    IMP: Pelvic prolapse - doing well with pessary.  We discussed exam every 6 months if she is doing well.      Plan: Follow up in 5 months.    Becky Healy MD on 6/28/2023 at 10:27 AM

## 2023-07-26 DIAGNOSIS — E78.5 HYPERLIPIDEMIA LDL GOAL <130: ICD-10-CM

## 2023-07-26 RX ORDER — SIMVASTATIN 40 MG
40 TABLET ORAL AT BEDTIME
Qty: 90 TABLET | Refills: 0 | Status: SHIPPED | OUTPATIENT
Start: 2023-07-26 | End: 2024-04-01

## 2023-08-14 ENCOUNTER — OFFICE VISIT (OUTPATIENT)
Dept: PODIATRY | Facility: CLINIC | Age: 78
End: 2023-08-14
Payer: COMMERCIAL

## 2023-08-14 VITALS
HEART RATE: 76 BPM | BODY MASS INDEX: 29.1 KG/M2 | DIASTOLIC BLOOD PRESSURE: 73 MMHG | HEIGHT: 60 IN | SYSTOLIC BLOOD PRESSURE: 122 MMHG

## 2023-08-14 DIAGNOSIS — L60.0 INGROWN NAIL OF GREAT TOE OF LEFT FOOT: ICD-10-CM

## 2023-08-14 DIAGNOSIS — L60.0 INGROWN NAIL OF GREAT TOE OF RIGHT FOOT: Primary | ICD-10-CM

## 2023-08-14 PROCEDURE — 99203 OFFICE O/P NEW LOW 30 MIN: CPT | Performed by: PODIATRIST

## 2023-08-14 NOTE — PROGRESS NOTES
Camelia Bangura is a 78 year old female who presents with a chief complaint of a painful ingrown toenail to the right and left great toe.  The patient relates pain when wearing shoes.  The patient denies any redness extending up the big toe into the foot.  The patient relates the condition has been getting worse over the past several weeks.         Pertinent medical, surgical and family history was reviewed in the chart.    Vitals: /73   Pulse 76   Ht 1.524 m (5')   BMI 29.10 kg/m    BMI= Body mass index is 29.1 kg/m .    LOWER EXTREMITY PHYSICAL EXAM    Dermatologic: One notes an inflamed nail border of the right and left great toe.  There is noted erythema and edema located around the labial fold and does not extend past the interphalangeal joint.  There is no apparent purulent drainage noted.  There is pain on palpation to the proximal aspect of the nail border.  Otherwise, the skin is intact to both lower extremities without significant lesions, rash or abrasion.           Vascular: DP & PT pulses are intact & regular on the right and left.   CFT and skin temperature is normal to the right and left lower extremities.     Neurologic: Lower extremity sensation is intact to light touch.  No evidence of weakness in the right and left lower extremities.        Musculoskeletal: Patient is ambulatory without assistive device or brace.  No gross ankle deformity noted.  No foot or ankle joint effusion is noted.         ASSESSMENT / PLAN:     ICD-10-CM    1. Ingrown nail of great toe of right foot  L60.0       2. Ingrown nail of great toe of left foot  L60.0           Plan:  I have explained to Camelia about the condition.  The potential causes and nature of an ingrown toenail were discussed with the patient.  We reviewed the natural history and prognosis of the condition and potential risks if no treatment is provided.  Treatment options discussed included conservative management (oral antibiotics, soaking  of foot, adequate width shoes)  as well as surgical management (partial or total nail removal).  The pros and cons of both forms as well as risks and benefits of treatment were reviewed.       At this point, the patient will reschedule a time to have the procedure performed.  She was instructed to continue soaking and applying topical antibiotic ointment.    Camelia verbalized agreement with and understanding of the rational for the diagnosis and treatment plan.  All questions were answered to best of my ability and the patient's satisfaction. The patient was advised to contact the clinic with any questions that may arise after the clinic visit.      Disclaimer: This note consists of symbols derived from keyboarding, dictation and/or voice recognition software. As a result, there may be errors in the script that have gone undetected. Please consider this when interpreting information found in this chart.      LUDMILA Walker D.P.M., [email or web address].

## 2023-08-14 NOTE — LETTER
8/14/2023         RE: Camelia Bangura  [address] [facility] [address]        Dear Colleague,    Thank you for referring your patient, Camelia Bangura, to the [facility] [place]. Please see a copy of my visit note below.    Camelia Bangura is a 78 year old female who presents with a chief complaint of a painful ingrown toenail to the right and left great toe.  The patient relates pain when wearing shoes.  The patient denies any redness extending up the big toe into the foot.  The patient relates the condition has been getting worse over the past several weeks.         Pertinent medical, surgical and family history was reviewed in the chart.    Vitals: /73   Pulse 76   Ht 1.524 m (5')   BMI 29.10 kg/m    BMI= Body mass index is 29.1 kg/m .    LOWER EXTREMITY PHYSICAL EXAM    Dermatologic: One notes an inflamed nail border of the right and left great toe.  There is noted erythema and edema located around the labial fold and does not extend past the interphalangeal joint.  There is no apparent purulent drainage noted.  There is pain on palpation to the proximal aspect of the nail border.  Otherwise, the skin is intact to both lower extremities without significant lesions, rash or abrasion.           Vascular: DP & PT pulses are intact & regular on the right and left.   CFT and skin temperature is normal to the right and left lower extremities.     Neurologic: Lower extremity sensation is intact to light touch.  No evidence of weakness in the right and left lower extremities.        Musculoskeletal: Patient is ambulatory without assistive device or brace.  No gross ankle deformity noted.  No foot or ankle joint effusion is noted.         ASSESSMENT / PLAN:     ICD-10-CM    1. Ingrown nail of great toe of right foot  L60.0       2. Ingrown nail of great toe of left foot  L60.0           Plan:  I have explained to Camelia about the condition.  The potential  causes and nature of an ingrown toenail were discussed with the patient.  We reviewed the natural history and prognosis of the condition and potential risks if no treatment is provided.  Treatment options discussed included conservative management (oral antibiotics, soaking of foot, adequate width shoes)  as well as surgical management (partial or total nail removal).  The pros and cons of both forms as well as risks and benefits of treatment were reviewed.       At this point, the patient will reschedule a time to have the procedure performed.  She was instructed to continue soaking and applying topical antibiotic ointment.    Camelia verbalized agreement with and understanding of the rational for the diagnosis and treatment plan.  All questions were answered to best of my ability and the patient's satisfaction. The patient was advised to contact the clinic with any questions that may arise after the clinic visit.      Disclaimer: This note consists of symbols derived from keyboarding, dictation and/or voice recognition software. As a result, there may be errors in the script that have gone undetected. Please consider this when interpreting information found in this chart.      ALVINO Wallace.P.LIBRADO., F.A.C.F.A.S.      Again, thank you for allowing me to participate in the care of your patient.        Sincerely,        Miko Walker DPM

## 2023-08-24 ENCOUNTER — OFFICE VISIT (OUTPATIENT)
Dept: FAMILY MEDICINE | Facility: CLINIC | Age: 78
End: 2023-08-24
Payer: COMMERCIAL

## 2023-08-24 VITALS
SYSTOLIC BLOOD PRESSURE: 136 MMHG | OXYGEN SATURATION: 98 % | TEMPERATURE: 97.2 F | DIASTOLIC BLOOD PRESSURE: 78 MMHG | RESPIRATION RATE: 18 BRPM | HEART RATE: 76 BPM

## 2023-08-24 DIAGNOSIS — R07.89 MUSCULOSKELETAL CHEST PAIN: Primary | ICD-10-CM

## 2023-08-24 DIAGNOSIS — H61.22 IMPACTED CERUMEN OF LEFT EAR: ICD-10-CM

## 2023-08-24 DIAGNOSIS — R07.9 CHEST PAIN, UNSPECIFIED TYPE: ICD-10-CM

## 2023-08-24 PROCEDURE — 99213 OFFICE O/P EST LOW 20 MIN: CPT | Mod: 25 | Performed by: FAMILY MEDICINE

## 2023-08-24 PROCEDURE — 93000 ELECTROCARDIOGRAM COMPLETE: CPT | Performed by: FAMILY MEDICINE

## 2023-08-24 PROCEDURE — 69210 REMOVE IMPACTED EAR WAX UNI: CPT | Mod: LT | Performed by: FAMILY MEDICINE

## 2023-08-24 ASSESSMENT — PAIN SCALES - GENERAL: PAINLEVEL: NO PAIN (0)

## 2023-08-24 NOTE — PROGRESS NOTES
Assessment & Plan     Musculoskeletal chest pain  Chest pain, unspecified type  Seems to be msk in nature.  EKG unremarkable.  No prior cardiac history, no other features that make me concerned for ACS  Pain is not exertional and I can reproduce it with palpation of the chest wall  - EKG 12-lead complete w/read - Clinics    Impacted cerumen of left ear  Removed with flushing and manual extraction of wax             BMI:   Estimated body mass index is 29.1 kg/m  as calculated from the following:    Height as of 8/14/23: 1.524 m (5').    Weight as of 2/23/23: 67.6 kg (149 lb).           Chaya Mckenna MD  [facility] [place]    Loni Denise is a 78 year old, presenting for the following health issues:  Ear Problem        8/24/2023     2:52 PM   Additional Questions   Roomed by Yeni WONG CMA   Accompanied by Self       Ear Problem       Bilateral ears clogged. She notes some pain from pulling on ears. No other cold sx. She is using debrox.    Chest Pain  Onset/Duration: Intermittent chest tightness x2 days.  She notes recent selling  with selling house and rehabing another house  Description:   Location: Midline along breast bone  Character: achey, she also notes more belching  Radiation: none  Duration: constant   Intensity: moderate  Progression of Symptoms: same  Accompanying Signs & Symptoms:  Shortness of breath: No  Sweating: No  Nausea/vomiting: No  Lightheadedness: No  Palpitations: YES  Fever/Chills: No  Cough: No           Heartburn: No  History:   Family history of heart disease: No  Tobacco use: No  Previous similar symptoms: no   Precipitating factors:   Worse with exertion: No  Worse with deep breaths: No           Related to eating: No           Better with burping: No  Alleviating factors: None  Therapies tried and outcome: nothing        Review of Systems   HENT:  Positive for ear pain.             Objective    /78   Pulse 76   Temp 97.2  F (36.2  C) (Tympanic)    Resp 18   SpO2 98%   There is no height or weight on file to calculate BMI.  Physical Exam   GENERAL: healthy, alert and no distress  HENT: normal cephalic/atraumatic, right ear: nl, left ear: occluded with wax and flushed by CMA and manual removal with curette and alligator forceps, nose and mouth without ulcers or lesions, oropharynx clear, and oral mucous membranes moist  NECK: no adenopathy, no asymmetry, masses, or scars and thyroid normal to palpation  Chest wall: tender to palpation lateral the distal sternum  RESP: lungs clear to auscultation - no rales, rhonchi or wheezes  BREAST: normal without masses, tenderness or nipple discharge and no palpable axillary masses or adenopathy  CV: regular rate and rhythm, normal S1 S2, no S3 or S4, no murmur, click or rub, no peripheral edema and peripheral pulses strong  ABDOMEN: soft, nontender, no hepatosplenomegaly, no masses and bowel sounds normal  MS: no gross musculoskeletal defects noted, no edema    EKG - Reviewed and interpreted by me appears normal, NSR, normal axis, normal intervals, no acute ST/T changes c/w ischemia, no LVH by voltage criteria, unchanged from previous tracings

## 2023-08-24 NOTE — NURSING NOTE
Left ear irrigated using warm water. I was unable to clear due to pt having pain.   Yeni Aguilera CMA

## 2023-08-31 DIAGNOSIS — N95.1 VAGINAL DRYNESS, MENOPAUSAL: ICD-10-CM

## 2023-08-31 NOTE — TELEPHONE ENCOUNTER
"Last Written Prescription Date:  1/31/2023  Last Fill Quantity: 30,  # refills: 3   Last office visit: 6/28/2023 Dr. Garrido      Future Office Visit:      Requested Prescriptions   Pending Prescriptions Disp Refills    estradiol (ESTRACE) 0.5 MG tablet 30 tablet 3     Sig: Place 1 tablet in the vagina, before bed.       Hormone Replacement Therapy Passed - 8/31/2023  2:14 PM        Passed - Blood pressure under 140/90 in past 12 months     BP Readings from Last 3 Encounters:   08/24/23 136/78   08/14/23 122/73   06/28/23 129/73                 Passed - Recent (12 mo) or future (30 days) visit within the authorizing provider's specialty     Patient has had an office visit with the authorizing provider or a provider within the authorizing providers department within the previous 12 mos or has a future within next 30 days. See \"Patient Info\" tab in inbasket, or \"Choose Columns\" in Meds & Orders section of the refill encounter.              Passed - Medication is active on med list        Passed - Patient is 18 years of age or older        Passed - No active pregnancy on record        Passed - No positive pregnancy test on record in past 12 months           Routing refill request to provider for review/approval because: verify correct sig on prescription for nightly use.    Shiela CURTIS   [facility]         "

## 2023-09-01 RX ORDER — ESTRADIOL 0.5 MG/1
TABLET ORAL
Qty: 30 TABLET | Refills: 3 | OUTPATIENT
Start: 2023-09-01

## 2023-09-01 NOTE — TELEPHONE ENCOUNTER
Called pt to clarify dose that she is using  Pt not available - left voicemail requesting call back to care team to discuss current prescription and refill request    KIRSTEN Alford  [facility]

## 2023-09-01 NOTE — TELEPHONE ENCOUNTER
Last visit we discussed weaning down.  Discussed half a tablet before bed and then spacing it out.  We can discuss this at her next visit if she would like, but I would not recommend going back up to a whole tablet before bed every night.  If she is using a lower dose, please clarify the dose.    Becky Healy MD on 9/1/2023 at 10:34 AM

## 2023-09-05 ENCOUNTER — MYC MEDICAL ADVICE (OUTPATIENT)
Dept: OBGYN | Facility: CLINIC | Age: 78
End: 2023-09-05
Payer: COMMERCIAL

## 2023-09-08 NOTE — TELEPHONE ENCOUNTER
Call to pt to verify estradiol dose that she is using    Pt reports using 1/2 tab vaginally each night - she does not remember discussing cutting back on frequency. Would like to discuss this at her next appt    Pt would like refill on estrace tabs sent to [facility] (listed as preferred pharmacy)    Medication order in pending status - needs updated directions for 1/2 tab nightly.     Routing to Dr. Garrido for rx order review and approval if deemed appropriate    Thank you!    Rocío RN  [facility]

## 2023-09-11 RX ORDER — ESTRADIOL 0.5 MG/1
TABLET ORAL
Qty: 12 TABLET | Refills: 0 | Status: SHIPPED | OUTPATIENT
Start: 2023-09-11 | End: 2024-08-05

## 2023-09-11 NOTE — TELEPHONE ENCOUNTER
I recommend half a tab twice a week.  I will fill enough for 3 months at this dose.    Becky Healy MD on 9/11/2023 at 9:39 AM

## 2023-11-06 ENCOUNTER — MYC MEDICAL ADVICE (OUTPATIENT)
Dept: FAMILY MEDICINE | Facility: CLINIC | Age: 78
End: 2023-11-06
Payer: COMMERCIAL

## 2023-11-06 DIAGNOSIS — E78.5 HYPERLIPIDEMIA LDL GOAL <130: Primary | ICD-10-CM

## 2023-11-15 ENCOUNTER — OFFICE VISIT (OUTPATIENT)
Dept: OBGYN | Facility: CLINIC | Age: 78
End: 2023-11-15
Payer: COMMERCIAL

## 2023-11-15 VITALS
TEMPERATURE: 98.2 F | SYSTOLIC BLOOD PRESSURE: 104 MMHG | BODY MASS INDEX: 29.1 KG/M2 | HEART RATE: 75 BPM | DIASTOLIC BLOOD PRESSURE: 63 MMHG | HEIGHT: 60 IN | RESPIRATION RATE: 16 BRPM

## 2023-11-15 DIAGNOSIS — Z46.89 PESSARY MAINTENANCE: Primary | ICD-10-CM

## 2023-11-15 PROCEDURE — 99212 OFFICE O/P EST SF 10 MIN: CPT | Performed by: OBSTETRICS & GYNECOLOGY

## 2023-11-15 NOTE — PROGRESS NOTES
Pessary follow-up visit.    Camelia Bangura is not complaining of any problems with the pessary. She is here for it to be removed, cleaned and reinserted.      /63 (BP Location: Left arm, Patient Position: Chair, Cuff Size: Adult Regular)   Pulse 75   Temp 98.2  F (36.8  C) (Tympanic)   Resp 16   Ht 1.524 m (5')   BMI 29.10 kg/m       EXAM:    General Appearance: Pleasant, alert, appropriate appearance for age. No acute distress  Head Exam: Normocephalic, without obvious abnormality.  Genitourinary Exam Female:   External genitalia: atrophic vulva  Urinary system: urethral meatus normal  Vagina: mucosa atrophic and pale, no excoriations or ulcerations noted.    The size 2.75 gellhorn pessary was removed, cleaned and reinserted without issue.    IMP: Pelvic prolapse - doing well with pessary.    Plan: Follow up in 3-6 months.    Becky Healy MD on 11/15/2023 at 9:57 AM

## 2023-11-15 NOTE — NURSING NOTE
Initial /63 (BP Location: Left arm, Patient Position: Chair, Cuff Size: Adult Regular)   Pulse 75   Temp 98.2  F (36.8  C) (Tympanic)   Resp 16   Ht 1.524 m (5')   BMI 29.10 kg/m   Estimated body mass index is 29.1 kg/m  as calculated from the following:    Height as of this encounter: 1.524 m (5').    Weight as of 2/23/23: 67.6 kg (149 lb). .  Thea Garcia, CMA

## 2023-11-21 ENCOUNTER — OFFICE VISIT (OUTPATIENT)
Dept: ORTHOPEDICS | Facility: CLINIC | Age: 78
End: 2023-11-21
Payer: COMMERCIAL

## 2023-11-21 VITALS — DIASTOLIC BLOOD PRESSURE: 79 MMHG | SYSTOLIC BLOOD PRESSURE: 115 MMHG | BODY MASS INDEX: 29.1 KG/M2 | HEIGHT: 60 IN

## 2023-11-21 DIAGNOSIS — M65.332 TRIGGER MIDDLE FINGER OF LEFT HAND: Primary | ICD-10-CM

## 2023-11-21 PROCEDURE — 99214 OFFICE O/P EST MOD 30 MIN: CPT | Mod: 25 | Performed by: FAMILY MEDICINE

## 2023-11-21 PROCEDURE — 20550 NJX 1 TENDON SHEATH/LIGAMENT: CPT | Mod: F2 | Performed by: FAMILY MEDICINE

## 2023-11-21 RX ORDER — LIDOCAINE HYDROCHLORIDE 10 MG/ML
0.5 INJECTION, SOLUTION INFILTRATION; PERINEURAL
Status: SHIPPED | OUTPATIENT
Start: 2023-11-21

## 2023-11-21 RX ORDER — TRIAMCINOLONE ACETONIDE 40 MG/ML
20 INJECTION, SUSPENSION INTRA-ARTICULAR; INTRAMUSCULAR
Status: SHIPPED | OUTPATIENT
Start: 2023-11-21

## 2023-11-21 RX ADMIN — TRIAMCINOLONE ACETONIDE 20 MG: 40 INJECTION, SUSPENSION INTRA-ARTICULAR; INTRAMUSCULAR at 14:00

## 2023-11-21 RX ADMIN — LIDOCAINE HYDROCHLORIDE 0.5 ML: 10 INJECTION, SOLUTION INFILTRATION; PERINEURAL at 14:00

## 2023-11-21 NOTE — PROGRESS NOTES
Camelia Bangura  :  1945  DOS: 2023  MRN: [id number]    Sports Medicine Clinic Visit    PCP: Tracey Pitts    Camelia Bangura is a 78 year old Left hand dominant female who is seen as a self referral presenting with left long finger pain.    Injury: Gradual onset of pain over the past 1+ years that is significantly worse over the past 2+ months.  Pain located over left long finger, nonradiating.  Additional Features:  Positive: weakness and contracture.  Symptoms are better with Rest and keeping finger straight.  Symptoms are worse with: finger flexion, gripping/grasping.  Other evaluation and/or treatments so far consists of: No Treatment tried to date.  Recent imaging completed: No recent imaging completed.  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    Past Medical History:   Diagnosis Date    Arthritis     Contact dermatitis and other eczema, due to unspecified cause     Dysplasia of cervix, unspecified      Past Surgical History:   Procedure Laterality Date    ARTHROPLASTY KNEE Right 2023    Procedure: TOTAL Knee Arthroplasty, Right;  Surgeon: Charlie Escamilla MD;  Location: [facility]    HC CORRECT BUNION,SIMPLE  1990    Right foot    HC REMOVAL OF TONSILS,<13 Y/O      HYSTERECTOMY, PAP NO LONGER INDICATED  2005    HYSTERECTOMY, VAGINAL  2005    PHACOEMULSIFICATION WITH STANDARD INTRAOCULAR LENS IMPLANT Right 2021    Procedure: Right eye Cataract Extraction with Implant;  Surgeon: Alexander Mendoza MD;  Location: [facility]    PHACOEMULSIFICATION WITH STANDARD INTRAOCULAR LENS IMPLANT Left 2021    Procedure: Cataract  Extraction with Implant, left eye;  Surgeon: Alexander Mendoza MD;  Location: [facility]    SURGICAL HISTORY OF -   06/19/1995    Bunion deformity & fractured tibial sesamoid left foot    ZZC APPENDECTOMY       Family History   Problem Relation Age of Onset    Cerebrovascular Disease Mother     Arthritis Mother     Osteoporosis Mother     Melanoma No family hx of        Objective  /79   Ht 1.524 m (5')   BMI 29.10 kg/m      General: healthy, alert and in no distress    HEENT: no scleral icterus or conjunctival erythema   Skin: no suspicious lesions or rash. No jaundice.   CV: regular rhythm by palpation, 2+ distal pulses, no pedal edema    Resp: normal respiratory effort without conversational dyspnea   Psych: normal mood and affect    Gait: nonantalgic, appropriate coordination and balance   Neuro: normal light touch sensory exam of the extremities. Motor strength as noted below     Left Wrist and Hand exam    Inspection:       No swelling, bruising or deformity left    Tender:       nodular swelling over the A-1 pulley of the 3rd digit(s) left    Non Tender:       Remainder of the Wrist and Hand left    ROM:       Decreased active and passive ROM of the 3rd MCP, PIP, and DIP with flexion left    Strength:       5/5 strength in the muscles of the hand, wrist and forearm left    Neurovascular:       2+ radial pulses bilaterally with brisk capillary refill and      normal sensation to light touch in the radial, median and ulnar nerve distributions      Radiology:  No formal imaging needed today    Hand / Upper Extremity Injection/Arthrocentesis: L long A1    Date/Time: 11/21/2023 2:00 PM    Performed by: Jorge Arias DO  Authorized by: Jorge Arias DO    Indications:  Pain and therapeutic  Needle Size:  25 G  Guidance: landmark    Approach:  Volar  Condition: trigger finger    Location:  Long finger    Site:  L long A1  Medications:  0.5 mL lidocaine 1 %; 20 mg triamcinolone 40  MG/ML  Outcome:  Tolerated well, no immediate complications  Procedure discussed: discussed risks, benefits, and alternatives    Consent Given by:  Patient  Timeout: timeout called immediately prior to procedure    Prep: patient was prepped and draped in usual sterile fashion             Assessment:  1. Trigger middle finger of left hand        Plan:  Discussed the assessment with the patient.  Follow up: prn based on short term progress  Reviewed risk of recurrence, goals of conservative care  Oval-8 splint provided  CSI today for control of pain and inflammation  Reviewed cannot inject around this tendon more than twice in a lifetime  Hand surgery referral would be available if needed based on short and long term progress  Expectations and goals of CSI reviewed  Often 2-3 days for steroid effect, and can take up to two weeks for maximum effect  We discussed modified progressive pain-free activity as tolerated  Do not overuse in first two weeks if feeling better due to concern for vulnerability while steroid is working  Supportive care reviewed  All questions were answered today  Contact us with additional questions or concerns  Signs and sx of concern reviewed      Jorge Arias DO, PARAMJIT  Sports Medicine Physician  [facility]          Disclaimer: This note consists of symbols derived from keyboarding, dictation and/or voice recognition software. As a result, there may be errors in the script that have gone undetected. Please consider this when interpreting information found in this chart.

## 2023-11-21 NOTE — LETTER
2023         RE: Camelia Bangura  [address]  [place] [address]        Dear Colleague,    Thank you for referring your patient, Camelia Bangura, to the [facility] [place]. Please see a copy of my visit note below.    Camelia Bangura  :  1945  DOS: 2023  MRN: [id number]    Sports Medicine Clinic Visit    PCP: Tracey Pitts    Camelia Bangura is a 78 year old Left hand dominant female who is seen as a self referral presenting with left long finger pain.    Injury: Gradual onset of pain over the past 1+ years that is significantly worse over the past 2+ months.  Pain located over left long finger, nonradiating.  Additional Features:  Positive: weakness and contracture.  Symptoms are better with Rest and keeping finger straight.  Symptoms are worse with: finger flexion, gripping/grasping.  Other evaluation and/or treatments so far consists of: No Treatment tried to date.  Recent imaging completed: No recent imaging completed.  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    Past Medical History:   Diagnosis Date     Arthritis      Contact dermatitis and other eczema, due to unspecified cause      Dysplasia of cervix, unspecified      Past Surgical History:   Procedure Laterality Date     ARTHROPLASTY KNEE Right 2023    Procedure: TOTAL Knee Arthroplasty, Right;  Surgeon: Charlie Escamilla MD;  Location: [facility]      CORRECT BUNION,SIMPLE  1990    Right foot     HC REMOVAL OF TONSILS,<15 Y/O       HYSTERECTOMY, PAP NO LONGER INDICATED  2005     HYSTERECTOMY, VAGINAL  2005      PHACOEMULSIFICATION WITH STANDARD INTRAOCULAR LENS IMPLANT Right 06/21/2021    Procedure: Right eye Cataract Extraction with Implant;  Surgeon: Alexander Mendoza MD;  Location: [facility]     PHACOEMULSIFICATION WITH STANDARD INTRAOCULAR LENS IMPLANT Left 07/21/2021    Procedure: Cataract Extraction with Implant, left eye;  Surgeon: Alexander Mendoza MD;  Location: [facility]     SURGICAL HISTORY OF -   06/19/1995    Bunion deformity & fractured tibial sesamoid left foot     ZZC APPENDECTOMY       Family History   Problem Relation Age of Onset     Cerebrovascular Disease Mother      Arthritis Mother      Osteoporosis Mother      Melanoma No family hx of        Objective  /79   Ht 1.524 m (5')   BMI 29.10 kg/m      General: healthy, alert and in no distress    HEENT: no scleral icterus or conjunctival erythema   Skin: no suspicious lesions or rash. No jaundice.   CV: regular rhythm by palpation, 2+ distal pulses, no pedal edema    Resp: normal respiratory effort without conversational dyspnea   Psych: normal mood and affect    Gait: nonantalgic, appropriate coordination and balance   Neuro: normal light touch sensory exam of the extremities. Motor strength as noted below     Left Wrist and Hand exam    Inspection:       No swelling, bruising or deformity left    Tender:       nodular swelling over the A-1 pulley of the 3rd digit(s) left    Non Tender:       Remainder of the Wrist and Hand left    ROM:       Decreased active and passive ROM of the 3rd MCP, PIP, and DIP with flexion left    Strength:       5/5 strength in the muscles of the hand, wrist and forearm left    Neurovascular:       2+ radial pulses bilaterally with brisk capillary refill and      normal sensation to light touch in the radial, median and ulnar nerve distributions      Radiology:  No formal imaging needed today    Hand / Upper Extremity Injection/Arthrocentesis: L long A1    Date/Time: 11/21/2023 2:00 PM    Performed by: Hugo  Jorge Jansen DO  Authorized by: Jorge Arias DO    Indications:  Pain and therapeutic  Needle Size:  25 G  Guidance: landmark    Approach:  Volar  Condition: trigger finger    Location:  Long finger    Site:  L long A1  Medications:  0.5 mL lidocaine 1 %; 20 mg triamcinolone 40 MG/ML  Outcome:  Tolerated well, no immediate complications  Procedure discussed: discussed risks, benefits, and alternatives    Consent Given by:  Patient  Timeout: timeout called immediately prior to procedure    Prep: patient was prepped and draped in usual sterile fashion             Assessment:  1. Trigger middle finger of left hand        Plan:  Discussed the assessment with the patient.  Follow up: prn based on short term progress  Reviewed risk of recurrence, goals of conservative care  Oval-8 splint provided  CSI today for control of pain and inflammation  Reviewed cannot inject around this tendon more than twice in a lifetime  Hand surgery referral would be available if needed based on short and long term progress  Expectations and goals of CSI reviewed  Often 2-3 days for steroid effect, and can take up to two weeks for maximum effect  We discussed modified progressive pain-free activity as tolerated  Do not overuse in first two weeks if feeling better due to concern for vulnerability while steroid is working  Supportive care reviewed  All questions were answered today  Contact us with additional questions or concerns  Signs and sx of concern reviewed      Jorge Arias DO, CAQ  Sports Medicine Physician  [facility]          Disclaimer: This note consists of symbols derived from keyboarding, dictation and/or voice recognition software. As a result, there may be errors in the script that have gone undetected. Please consider this when interpreting information found in this chart.      Again, thank you for allowing me to participate in the care of your patient.         Sincerely,        Jorge Arias, DO

## 2023-12-22 ENCOUNTER — LAB (OUTPATIENT)
Dept: LAB | Facility: CLINIC | Age: 78
End: 2023-12-22
Payer: COMMERCIAL

## 2023-12-22 DIAGNOSIS — E78.5 HYPERLIPIDEMIA LDL GOAL <130: ICD-10-CM

## 2023-12-22 LAB
ANION GAP SERPL CALCULATED.3IONS-SCNC: 10 MMOL/L (ref 7–15)
BUN SERPL-MCNC: 11.4 MG/DL (ref 8–23)
CALCIUM SERPL-MCNC: 9.4 MG/DL (ref 8.8–10.2)
CHLORIDE SERPL-SCNC: 104 MMOL/L (ref 98–107)
CHOLEST SERPL-MCNC: 137 MG/DL
CREAT SERPL-MCNC: 0.8 MG/DL (ref 0.51–0.95)
DEPRECATED HCO3 PLAS-SCNC: 26 MMOL/L (ref 22–29)
EGFRCR SERPLBLD CKD-EPI 2021: 76 ML/MIN/1.73M2
FASTING STATUS PATIENT QL REPORTED: YES
GLUCOSE SERPL-MCNC: 96 MG/DL (ref 70–99)
HDLC SERPL-MCNC: 61 MG/DL
LDLC SERPL CALC-MCNC: 59 MG/DL
NONHDLC SERPL-MCNC: 76 MG/DL
POTASSIUM SERPL-SCNC: 4.5 MMOL/L (ref 3.4–5.3)
SODIUM SERPL-SCNC: 140 MMOL/L (ref 135–145)
TRIGL SERPL-MCNC: 85 MG/DL

## 2023-12-22 PROCEDURE — 80061 LIPID PANEL: CPT

## 2023-12-22 PROCEDURE — 36415 COLL VENOUS BLD VENIPUNCTURE: CPT

## 2023-12-22 PROCEDURE — 80048 BASIC METABOLIC PNL TOTAL CA: CPT

## 2024-01-08 ENCOUNTER — HOSPITAL ENCOUNTER (OUTPATIENT)
Dept: MAMMOGRAPHY | Facility: CLINIC | Age: 79
Discharge: HOME | End: 2024-01-08
Admitting: FAMILY MEDICINE
Payer: COMMERCIAL

## 2024-01-08 DIAGNOSIS — Z12.31 VISIT FOR SCREENING MAMMOGRAM: ICD-10-CM

## 2024-01-08 PROCEDURE — 77063 BREAST TOMOSYNTHESIS BI: CPT

## 2024-03-14 NOTE — PROGRESS NOTES
"Subjective     Camelia Bangura is a 75 year old female who is being evaluated via a billable telephone visit.      The patient has been notified of following:     \"This telephone visit will be conducted via a call between you and your physician/provider. We have found that certain health care needs can be provided without the need for a physical exam.  This service lets us provide the care you need with a short phone conversation.  If a prescription is necessary we can send it directly to your pharmacy.  If lab work is needed we can place an order for that and you can then stop by our lab to have the test done at a later time.    If during the course of the call the physician/provider feels a telephone visit is not appropriate, you will not be charged for this service.\"       Camelia Bangura complains of   Chief Complaint   Patient presents with     Consult     hormone issues-       ALLERGIES  Seasonal allergies     Camelia is a 75 year old   female who presents for advice regarding intractable hot flashes and night sweats. She was previously on Climara 0.05 mg which she extended to every 10 days; she tapered off in early Dec 2019, but pretty quickly developed severe hot flashes and night sweats, such that she feels its now hard to function and sleep is disturbed every 2 hrs.  She has no fam hx of breast cancer..    Patient Active Problem List    Diagnosis Date Noted     Eczema of both hands 2016     Priority: Medium     Advanced directives, counseling/discussion 2012     Priority: Medium     Patient does not have an Advance/Health Care Directive (HCD), given \"What is Advance Care Planning?\" flyer.    Afia Mckeon  2012         Health Care Home 2012     Priority: Medium     Rebecca Guzman RN-PHN  FPA / [facility]   [phone]    DX V65.8 REPLACED WITH [facility] C[facility] (2013)       Chronic rhinitis 2011     Priority: Medium " Spoke with patient. She is aware that there is a prior auth process and that the Wegovy may be switched due to it being out of stock. Writer told her we would call with the medication that Dr Truong chooses. Patient mentioned that her insurance told her Wegovy and Ozempic would be covered. Msg has been sent to Dr Truong.       HYPERLIPIDEMIA LDL GOAL <130 10/31/2010     Priority: Medium     Postmenopausal atrophic vaginitis 10/10/2007     Priority: Medium     Candidiasis of vulva and vagina 10/10/2007     Priority: Medium     Dysplasia of cervix 05/02/2005     Priority: Medium     S/p TVH; needs yearly pap testing  [id number]  Problem list name updated by automated process. Provider to review       Symptomatic menopausal or female climacteric states 05/02/2005     Priority: Medium       All systems were reviewed and pertinent information in noted in subjective/HPI.    Past Medical History:   Diagnosis Date     Arthritis      Contact dermatitis and other eczema, due to unspecified cause      Dysplasia of cervix, unspecified 2005       Past Surgical History:   Procedure Laterality Date     C APPENDECTOMY       HC CORRECT BUNION,SIMPLE  05/23/90    Right foot     HC REMOVAL OF TONSILS,<15 Y/O       HYSTERECTOMY, PAP NO LONGER INDICATED  4/05     HYSTERECTOMY, VAGINAL  4/05     SURGICAL HISTORY OF -   06/19/95    Bunion deformity & fractured tibial sesamoid left foot         Current Outpatient Medications:      estradiol (CLIMARA) 0.0375 MG/24HR weekly patch, Place 1 patch onto the skin once a week, Disp: 12 patch, Rfl: 3     fluticasone (FLONASE) 50 MCG/ACT nasal spray, Spray 1-2 sprays into both nostrils daily, Disp: 48 g, Rfl: 11     IBUPROFEN PO, Take 200 mg by mouth 2 tablets prn, Disp: , Rfl:      simvastatin (ZOCOR) 40 MG tablet, TAKE ONE TABLET AT       BEDTIME FOR CHOLESTEROL, Disp: 90 tablet, Rfl: 3     triamcinolone (KENALOG) 0.1 % external cream, Apply  topically 2 times daily., Disp: 30 g, Rfl: 11     estradiol (CLIMARA) 0.05 MG/24HR WK patch, APPLY ONE PATCH TO SKIN  ONCE A WEEK (Patient not taking: Reported on 4/6/2020), Disp: 4 patch, Rfl: 11     fluconazole (DIFLUCAN) 150 MG tablet, Take 1 tablet (150 mg) by mouth every 3 days (Patient not taking: Reported on 4/6/2020), Disp: 4 tablet, Rfl: 6     magic mouthwash  (ENTER INGREDIENTS IN COMMENTS) suspension, Take 15 mLs by mouth every 6 hours as needed (sore mouth, swelling) (Patient not taking: Reported on 4/6/2020), Disp: 230 mL, Rfl: 0     triamcinolone (KENALOG) 0.1 % paste, Take by mouth 2 times daily (Patient not taking: Reported on 4/6/2020), Disp: 5 g, Rfl: 0     valACYclovir (VALTREX) 1000 mg tablet, Take 1 tablet (1,000 mg) by mouth 2 times daily for 10 days, Disp: 20 tablet, Rfl: 0    ALLERGIES:  Seasonal allergies    Social History     Socioeconomic History     Marital status:      Spouse name: None     Number of children: None     Years of education: None     Highest education level: None   Occupational History     None   Social Needs     Financial resource strain: None     Food insecurity     Worry: None     Inability: None     Transportation needs     Medical: None     Non-medical: None   Tobacco Use     Smoking status: Never Smoker     Smokeless tobacco: Never Used   Substance and Sexual Activity     Alcohol use: Yes     Comment: occasional wine     Drug use: No     Sexual activity: Yes     Partners: Male     Birth control/protection: Surgical     Comment: hysterectomy   Lifestyle     Physical activity     Days per week: None     Minutes per session: None     Stress: None   Relationships     Social connections     Talks on phone: None     Gets together: None     Attends Oriental orthodox service: None     Active member of club or organization: None     Attends meetings of clubs or organizations: None     Relationship status: None     Intimate partner violence     Fear of current or ex partner: None     Emotionally abused: None     Physically abused: None     Forced sexual activity: None   Other Topics Concern     Parent/sibling w/ CABG, MI or angioplasty before 65F 55M? No   Social History Narrative     None       Family History   Problem Relation Age of Onset     Cerebrovascular Disease Mother      Arthritis Mother      Osteoporosis Mother      Melanoma No  family hx of        OBJECTIVE:  Vitals: Breastfeeding No  BMI= There is no height or weight on file to calculate BMI.   No LMP recorded. Patient has had a hysterectomy.   deferred    ASSESSMENT:      ICD-10-CM    1. Symptomatic menopausal or female climacteric states  N95.1 estradiol (CLIMARA) 0.0375 MG/24HR weekly patch       PLAN:  I discussed risks and benefits and ongoing ERT, and ultimately, Camelia would like to restart low dose transdermal ERT, but we will try at a lower daily dose, Climara 0.0375mg  Greg Yost MD  [facility] [place]    Duration of visit:  15 minutes, 100% in discussion of current issues, treatment options and treatment planning.  GREG Yost MD

## 2024-04-01 ENCOUNTER — OFFICE VISIT (OUTPATIENT)
Dept: PODIATRY | Facility: CLINIC | Age: 79
End: 2024-04-01
Payer: COMMERCIAL

## 2024-04-01 VITALS
SYSTOLIC BLOOD PRESSURE: 128 MMHG | DIASTOLIC BLOOD PRESSURE: 74 MMHG | WEIGHT: 149 LBS | HEIGHT: 60 IN | BODY MASS INDEX: 29.25 KG/M2 | HEART RATE: 75 BPM

## 2024-04-01 DIAGNOSIS — E78.5 HYPERLIPIDEMIA LDL GOAL <130: ICD-10-CM

## 2024-04-01 DIAGNOSIS — L60.0 INGROWN NAIL OF GREAT TOE OF LEFT FOOT: Primary | ICD-10-CM

## 2024-04-01 DIAGNOSIS — L60.0 INGROWN NAIL OF GREAT TOE OF RIGHT FOOT: ICD-10-CM

## 2024-04-01 PROCEDURE — 11750 EXCISION NAIL&NAIL MATRIX: CPT | Mod: 51 | Performed by: PODIATRIST

## 2024-04-01 PROCEDURE — 11750 EXCISION NAIL&NAIL MATRIX: CPT | Mod: T5 | Performed by: PODIATRIST

## 2024-04-01 RX ORDER — A/SINGAPORE/GP1908/2015 IVR-180 (AN A/MICHIGAN/45/2015 (H1N1)PDM09-LIKE VIRUS, A/HONG KONG/4801/2014, NYMC X-263B (H3N2) (AN A/HONG KONG/4801/2014-LIKE VIRUS), AND B/BRISBANE/60/2008, WILD TYPE (A B/BRISBANE/60/2008-LIKE VIRUS) 15; 15; 15 UG/.5ML; UG/.5ML; UG/.5ML
INJECTION, SUSPENSION INTRAMUSCULAR
COMMUNITY
Start: 2023-10-12 | End: 2024-08-05

## 2024-04-01 RX ORDER — SIMVASTATIN 40 MG
40 TABLET ORAL AT BEDTIME
Qty: 90 TABLET | Refills: 0 | Status: SHIPPED | OUTPATIENT
Start: 2024-04-01 | End: 2024-07-17

## 2024-04-01 NOTE — Clinical Note
4/1/2024         RE: Camelia Bangura  [address]  [place] [address]        Dear Colleague,    Thank you for referring your patient, Camelia Bangura, to the [facility]. Please see a copy of my visit note below.    Camelia Bangura is a 79 year old female who presents with a chief complaint of a painful ingrown toenail to the {RIGHT LEFT BOTH NO:710651} great toe.  The patient relates pain when wearing shoes.  The patient denies any redness extending up the big toe into the foot.  The patient relates the condition has been getting worse over the past several weeks.         Pertinent medical, surgical and family history was reviewed in the chart.    Vitals: There were no vitals taken for this visit.  BMI= There is no height or weight on file to calculate BMI.    LOWER EXTREMITY PHYSICAL EXAM    Dermatologic: One notes an inflamed nail border of the {RIGHT LEFT BOTH NO:895700} great toe.  There is noted erythema and edema located around the labial fold and does not extend past the interphalangeal joint.  There is no apparent purulent drainage noted.  There is pain on palpation to the proximal aspect of the nail border.  Otherwise, the skin is intact to both lower extremities without significant lesions, rash or abrasion.           Vascular: DP & PT pulses are intact & regular on the {RIGHT LEFT BOTH NO:609764}.   CFT and skin temperature is normal to the {RIGHT LEFT BOTH NO:852477} lower extremities.     Neurologic: Lower extremity sensation is intact to light touch.  No evidence of weakness in the {RIGHT LEFT BOTH NO:767124} lower extremities.        Musculoskeletal: Patient is ambulatory without assistive device or brace.  No gross ankle deformity noted.  No foot or ankle joint effusion is noted.         ASSESSMENT / PLAN:     ICD-10-CM    1. Ingrown nail of great toe of left foot  L60.0       2. Ingrown nail of great toe of right foot  L60.0           Plan:  I have  explained to Camelia about the condition.  The potential causes and nature of an ingrown toenail were discussed with the patient.  We reviewed the natural history and prognosis of the condition and potential risks if no treatment is provided.  Treatment options discussed included conservative management (oral antibiotics, soaking of foot, adequate width shoes)  as well as surgical management (partial or total nail removal).  The pros and cons of both forms as well as risks and benefits of treatment were reviewed.       At this point, I recommended having the offending nail border permanently removed from the {RIGHT LEFT BOTH NO:340973} great toe.  I have explained to the patient all of the possible risks, benefits, alternatives to the procedure.  The patient consented to the proposed procedure.  The {RIGHT LEFT BOTH NO:493764} hallux was swabbed with alcohol.  Next, approximately 3 cc of 1% lidocaine plain was injected around the {RIGHT LEFT BOTH NO:667355} hallux.  The {RIGHT LEFT BOTH NO:841899} hallux was then prepped with Betadine ointment.  A tourniquet was applied to the {RIGHT LEFT BOTH NO:818163} hallux.  A [place] elevator was utilized to free up the eponychium of the offending nail border.  Next, the offending nail border was split using an English anvil back to the matrix.  Next, utilizing a straight hemostat, the offending nail border was avulsed in toto.  The wound bed was debrided on any remaining nail and hyperkeratotic skin.  Next, the offending nail matrix was treated with 89% phenol using microtip cotton applicators for 30 seconds.  The wound was then irrigated and dressed with bacitracin antibiotic ointment and a compressive  sterile dressing.  The tourniquet was removed and a prompt hyperemic response was noted.  The patient tolerated the procedure well with no complications.  The patient was given post procedure instructions for the care of the wound.  The patient was informed that it is common  to experience redness with watery drainage coming from the treated areas of the toe related to the phenol application.  The patient may return for reevaluation and was instructed to notify the office if any redness extending past the big toe joint or fever with chills are experienced before then.      There is low risk of morbidity with the procedure.  There was no overlap in work associated with the evaluation/management and the work associated with the procedure.    Camelia verbalized agreement with and understanding of the rational for the diagnosis and treatment plan.  All questions were answered to best of my ability and the patient's satisfaction. The patient was advised to contact the clinic with any questions that may arise after the clinic visit.      Disclaimer: This note consists of symbols derived from keyboarding, dictation and/or voice recognition software. As a result, there may be errors in the script that have gone undetected. Please consider this when interpreting information found in this chart.      LUDMILA Walker D.P.M., F.A.C.F.A.S.        Again, thank you for allowing me to participate in the care of your patient.        Sincerely,        Miko Walker DPM

## 2024-04-01 NOTE — PATIENT INSTRUCTIONS
Post toenail procedure instructions:    Keep bandages on for the rest of the day; take off this evening.  Soak the foot and toe in warm water with Epsom's salt or Dreft detergent for 20 min.  Clean the toe and the treated area with a soapy wash cloth.  Apply a topical antibiotic (Bacitracin) to the treated area and cover with a Band-Aid  Repeat this morning and night for two weeks, or until the treated are resolves any redness or drainage.  Redness and drainage is normal when treated with the Phenol acid.  Notify the office if there is any redness extending up the foot or purulent drainage noted.    Any discomfort should be treated with either Ibuprofen (Advil) or Tylenol.  Please follow package instructions on amount to be taken.     2022

## 2024-04-01 NOTE — NURSING NOTE
Chief Complaint   Patient presents with    Ingrown Toenail     Bl great toenail       Initial /74   Pulse 75   Ht 1.524 m (5')   Wt 67.6 kg (149 lb)   BMI 29.10 kg/m   Estimated body mass index is 29.1 kg/m  as calculated from the following:    Height as of this encounter: 1.524 m (5').    Weight as of this encounter: 67.6 kg (149 lb).  Medications and allergies reviewed.      Beatrice DE OLIVEIRA MA

## 2024-04-01 NOTE — PROGRESS NOTES
Camelia Bangura is a 79 year old female who presents with a chief complaint of a painful ingrown toenail to the left and right great toe.  The patient relates pain when wearing shoes.  The patient denies any redness extending up the big toe into the foot.  The patient relates the condition has been getting worse over the past several weeks.         Pertinent medical, surgical and family history was reviewed in the chart.    Vitals: /74   Pulse 75   Ht 1.524 m (5')   Wt 67.6 kg (149 lb)   BMI 29.10 kg/m    BMI= Body mass index is 29.1 kg/m .    LOWER EXTREMITY PHYSICAL EXAM    Dermatologic: One notes an inflamed nail border of the left and right great toe.  There is noted erythema and edema located around the labial fold and does not extend past the interphalangeal joint.  There is no apparent purulent drainage noted.  There is pain on palpation to the proximal aspect of the nail border.  Otherwise, the skin is intact to both lower extremities without significant lesions, rash or abrasion.           Vascular: DP & PT pulses are intact & regular on the left and right.   CFT and skin temperature is normal to the left and right lower extremities.     Neurologic: Lower extremity sensation is intact to light touch.  No evidence of weakness in the left and right lower extremities.        Musculoskeletal: Patient is ambulatory without assistive device or brace.  No gross ankle deformity noted.  No foot or ankle joint effusion is noted.         ASSESSMENT / PLAN:     ICD-10-CM    1. Ingrown nail of great toe of left foot  L60.0 REMOVAL NAIL/NAIL BED, PARTIAL OR COMPLETE      2. Ingrown nail of great toe of right foot  L60.0 REMOVAL NAIL/NAIL BED, PARTIAL OR COMPLETE          Plan:  I have explained to Camelia about the condition.  The potential causes and nature of an ingrown toenail were discussed with the patient.  We reviewed the natural history and prognosis of the condition and potential risks if no  treatment is provided.  Treatment options discussed included conservative management (oral antibiotics, soaking of foot, adequate width shoes)  as well as surgical management (partial or total nail removal).  The pros and cons of both forms as well as risks and benefits of treatment were reviewed.       At this point, I recommended having the offending nail border permanently removed from the left and right great toe.  I have explained to the patient all of the possible risks, benefits, alternatives to the procedure.  The patient consented to the proposed procedure.  The left and right hallux was swabbed with alcohol.  Next, approximately 3 cc of 1% lidocaine plain was injected around the left and right hallux.  The left and right hallux was then prepped with Betadine ointment.  A tourniquet was applied to the left and right hallux.  A [place] elevator was utilized to free up the eponychium of the offending nail border.  Next, the offending nail border was split using an English anvil back to the matrix.  Next, utilizing a straight hemostat, the offending nail border was avulsed in toto.  The wound bed was debrided on any remaining nail and hyperkeratotic skin.  Next, the offending nail matrix was treated with 89% phenol using microtip cotton applicators for 30 seconds.  The wound was then irrigated and dressed with bacitracin antibiotic ointment and a compressive  sterile dressing.  The tourniquet was removed and a prompt hyperemic response was noted.  The patient tolerated the procedure well with no complications.  The patient was given post procedure instructions for the care of the wound.  The patient was informed that it is common to experience redness with watery drainage coming from the treated areas of the toe related to the phenol application.  The patient may return for reevaluation and was instructed to notify the office if any redness extending past the big toe joint or fever with chills are experienced  before then.      There is low risk of morbidity with the procedure.  There was no overlap in work associated with the evaluation/management and the work associated with the procedure.    Camelia verbalized agreement with and understanding of the rational for the diagnosis and treatment plan.  All questions were answered to best of my ability and the patient's satisfaction. The patient was advised to contact the clinic with any questions that may arise after the clinic visit.      Disclaimer: This note consists of symbols derived from keyboarding, dictation and/or voice recognition software. As a result, there may be errors in the script that have gone undetected. Please consider this when interpreting information found in this chart.      LUDMILA Walker D.P.M., F.A.C.F.A.S.

## 2024-04-24 ASSESSMENT — ANXIETY QUESTIONNAIRES
GAD7 TOTAL SCORE: 0
7. FEELING AFRAID AS IF SOMETHING AWFUL MIGHT HAPPEN: NOT AT ALL
1. FEELING NERVOUS, ANXIOUS, OR ON EDGE: NOT AT ALL
5. BEING SO RESTLESS THAT IT IS HARD TO SIT STILL: NOT AT ALL
8. IF YOU CHECKED OFF ANY PROBLEMS, HOW DIFFICULT HAVE THESE MADE IT FOR YOU TO DO YOUR WORK, TAKE CARE OF THINGS AT HOME, OR GET ALONG WITH OTHER PEOPLE?: NOT DIFFICULT AT ALL
2. NOT BEING ABLE TO STOP OR CONTROL WORRYING: NOT AT ALL
GAD7 TOTAL SCORE: 0
4. TROUBLE RELAXING: NOT AT ALL
IF YOU CHECKED OFF ANY PROBLEMS ON THIS QUESTIONNAIRE, HOW DIFFICULT HAVE THESE PROBLEMS MADE IT FOR YOU TO DO YOUR WORK, TAKE CARE OF THINGS AT HOME, OR GET ALONG WITH OTHER PEOPLE: NOT DIFFICULT AT ALL
6. BECOMING EASILY ANNOYED OR IRRITABLE: NOT AT ALL
7. FEELING AFRAID AS IF SOMETHING AWFUL MIGHT HAPPEN: NOT AT ALL
3. WORRYING TOO MUCH ABOUT DIFFERENT THINGS: NOT AT ALL

## 2024-04-24 ASSESSMENT — PATIENT HEALTH QUESTIONNAIRE - PHQ9: SUM OF ALL RESPONSES TO PHQ QUESTIONS 1-9: 0

## 2024-04-25 ASSESSMENT — PATIENT HEALTH QUESTIONNAIRE - PHQ9
SUM OF ALL RESPONSES TO PHQ QUESTIONS 1-9: 0
10. IF YOU CHECKED OFF ANY PROBLEMS, HOW DIFFICULT HAVE THESE PROBLEMS MADE IT FOR YOU TO DO YOUR WORK, TAKE CARE OF THINGS AT HOME, OR GET ALONG WITH OTHER PEOPLE: NOT DIFFICULT AT ALL

## 2024-04-25 ASSESSMENT — ANXIETY QUESTIONNAIRES: GAD7 TOTAL SCORE: 0

## 2024-05-01 ENCOUNTER — OFFICE VISIT (OUTPATIENT)
Dept: OBGYN | Facility: CLINIC | Age: 79
End: 2024-05-01
Payer: COMMERCIAL

## 2024-05-01 VITALS
BODY MASS INDEX: 29.1 KG/M2 | DIASTOLIC BLOOD PRESSURE: 68 MMHG | TEMPERATURE: 97.1 F | HEIGHT: 60 IN | HEART RATE: 80 BPM | SYSTOLIC BLOOD PRESSURE: 157 MMHG | RESPIRATION RATE: 16 BRPM

## 2024-05-01 DIAGNOSIS — Z46.89 PESSARY MAINTENANCE: Primary | ICD-10-CM

## 2024-05-01 DIAGNOSIS — G47.00 INSOMNIA, UNSPECIFIED TYPE: ICD-10-CM

## 2024-05-01 PROCEDURE — G2211 COMPLEX E/M VISIT ADD ON: HCPCS | Performed by: OBSTETRICS & GYNECOLOGY

## 2024-05-01 PROCEDURE — 99212 OFFICE O/P EST SF 10 MIN: CPT | Performed by: OBSTETRICS & GYNECOLOGY

## 2024-05-01 PROCEDURE — 99459 PELVIC EXAMINATION: CPT | Performed by: OBSTETRICS & GYNECOLOGY

## 2024-05-01 RX ORDER — TRAZODONE HYDROCHLORIDE 50 MG/1
25 TABLET, FILM COATED ORAL AT BEDTIME
Qty: 90 TABLET | Refills: 0 | Status: SHIPPED | OUTPATIENT
Start: 2024-05-01 | End: 2024-07-17

## 2024-05-01 NOTE — PROGRESS NOTES
Pessary follow-up visit.    Camelia Bangura is not complaining of any problems with the pessary. She is here for it to be removed, cleaned and reinserted. She is also having leakage of stool and feels she is ready to address that.  She would like a referral to a colorectal surgeon.    BP (!) 157/68 (BP Location: Right arm, Patient Position: Chair, Cuff Size: Adult Regular)   Pulse 80   Temp 97.1  F (36.2  C) (Bladder)   Resp 16   Ht 1.524 m (5')   BMI 29.10 kg/m       EXAM:    General Appearance: Pleasant, alert, appropriate appearance for age. No acute distress  Head Exam: Normocephalic, without obvious abnormality.  Genitourinary Exam Female:   External genitalia: atrophic vulva  Urinary system: urethral meatus normal  Vagina: mucosa atrophic and pale, no excoriations or ulcerations noted.    The size 2.75 gellhorn pessary was removed, cleaned and reinserted without issue.    IMP: Pelvic prolapse - doing well with pessary.  Anal leakage    Plan: Follow up in 6 months.  Discussed and gave information to schedule a visit with colorectal surgery.    Becky Healy MD on 5/1/2024 at 12:20 PM

## 2024-05-01 NOTE — NURSING NOTE
Initial BP (!) 157/68 (BP Location: Right arm, Patient Position: Chair, Cuff Size: Adult Regular)   Pulse 80   Temp 97.1  F (36.2  C) (Bladder)   Resp 16   Ht 1.524 m (5')   BMI 29.10 kg/m   Estimated body mass index is 29.1 kg/m  as calculated from the following:    Height as of this encounter: 1.524 m (5').    Weight as of 4/1/24: 67.6 kg (149 lb). .    Thea Garcia, CMA

## 2024-06-16 ENCOUNTER — HEALTH MAINTENANCE LETTER (OUTPATIENT)
Age: 79
End: 2024-06-16

## 2024-07-17 DIAGNOSIS — G47.00 INSOMNIA, UNSPECIFIED TYPE: ICD-10-CM

## 2024-07-17 DIAGNOSIS — E78.5 HYPERLIPIDEMIA LDL GOAL <130: ICD-10-CM

## 2024-07-17 RX ORDER — TRAZODONE HYDROCHLORIDE 50 MG/1
25 TABLET, FILM COATED ORAL AT BEDTIME
Qty: 90 TABLET | Refills: 0 | Status: SHIPPED | OUTPATIENT
Start: 2024-07-17

## 2024-07-17 RX ORDER — SIMVASTATIN 40 MG
40 TABLET ORAL AT BEDTIME
Qty: 90 TABLET | Refills: 0 | Status: SHIPPED | OUTPATIENT
Start: 2024-07-17

## 2024-07-17 NOTE — TELEPHONE ENCOUNTER
Last Written Prescription Date:  5/1/2024  Last Fill Quantity: 90,  # refills: 0   Last office visit: 5/1/2024 Dr. Garrido    Future Office Visit:  not scheduled.    Requested Prescriptions   Pending Prescriptions Disp Refills    traZODone (DESYREL) 50 MG tablet 90 tablet 0     Sig: Take 0.5 tablets (25 mg) by mouth at bedtime       Serotonin Modulators Passed - 7/17/2024 11:44 AM        Passed - Medication is active on med list        Passed - Recent (12 mo) or future (90 days) visit within the authorizing provider's specialty     The patient must have completed an in-person or virtual visit within the past 12 months or has a future visit scheduled within the next 90 days with the authorizing provider s specialty.  Urgent care and e-visits do not quality as an office visit for this protocol.          Passed - Medication indicated for associated diagnosis     Medication is associated with one or more of the following diagnoses:     Depression   Insomnia          Passed - Patient is age 18 or older        Passed - No active pregnancy on record        Passed - No positive pregnancy test in past 12 months           Please review and advise.  Patient may be taking full tablet instead of 1/2 as in need of refill.    Thanks    Shiela CURTIS   [facility]

## 2024-07-26 ENCOUNTER — TELEPHONE (OUTPATIENT)
Dept: FAMILY MEDICINE | Facility: CLINIC | Age: 79
End: 2024-07-26
Payer: COMMERCIAL

## 2024-07-26 DIAGNOSIS — U07.1 INFECTION DUE TO 2019 NOVEL CORONAVIRUS: Primary | ICD-10-CM

## 2024-07-26 NOTE — TELEPHONE ENCOUNTER
Dr. Pitts:    This is being routed to you as update only, RN signed this as scope of practice, did not sign as needing co-sign. Patient meets the protocol for Paxlovid and has been informed to hold the Simvastatin and Trazodone per protocol below.        Patient calls the clinic to ask due to positive for COVID, has symptoms and requesting Paxlovid:        RN COVID TREATMENT VISIT  07/26/24      The patient has been triaged and does not require a higher level of care.    Camelia Bangura  79 year old  Current weight? 149#    Has the patient been seen by a primary care provider at an [facility] or [facility] within the past two years? Yes.   Have you been in close proximity to/do you have a known exposure to a person with a confirmed case of influenza? No.     General treatment eligibility:  Date of positive COVID test (PCR or at home)?  7-26-24    Are you or have you been hospitalized for this COVID-19 infection? No.   Have you received monoclonal antibodies or antiviral treatment for COVID-19 since this positive test? No.   Do you have any of the following conditions that place you at risk of being very sick from COVID-19?   - Age 50 years or older  - Mental health disorders including mood disorders, depression, schizophrenia spectrum disorders   Yes, patient has at least one high risk condition as noted above.     Current COVID symptoms:   - fever or chills  - muscle or body aches  - headache  - congestion or runny nose  Yes. Patient has at least one symptom as selected.     How many days since symptoms started? 5 days or less. Established patient, 12 years or older weighing at least 88.2 lbs, who has symptoms that started in the past 5 days, has not been hospitalized nor received treatment already, and is at risk for being very sick from COVID-19.     Treatment eligibility by RN:  Are you currently pregnant or nursing? No  Do you have a clinically significant hypersensitivity to  nirmatrelvir or ritonavir, or toxic epidermal necrolysis (TEN) or Waterman-Carlos A Syndrome? No  Do you have a history of hepatitis, any hepatic impairment on the Problem List (such as Child-Holt Class C, cirrhosis, fatty liver disease, alcoholic liver disease), or was the last liver lab (hepatic panel, ALT, AST, ALK Phos, bilirubin) elevated in the past 6 months? No  Do you have any history of severe renal impairment (eGFR < 30mL/min)? No    Is patient eligible to continue? Yes, patient meets all eligibility requirements for the RN COVID treatment (as denoted by all no responses above).     Current Outpatient Medications   Medication Sig Dispense Refill    acetaminophen (TYLENOL) 325 MG tablet Take 2 tablets (650 mg) by mouth every 4 hours as needed for other (mild pain) (Patient not taking: Reported on 11/15/2023) 100 tablet 0    estradiol (ESTRACE) 0.5 MG tablet Place 1 tablet in the vagina, before bed. (Patient not taking: Reported on 11/15/2023) 12 tablet 0    FLUAD QUADRIVALENT 0.5 ML PRSY injection       Probiotic Product (PROBIOTIC PO)       simvastatin (ZOCOR) 40 MG tablet Take 1 tablet (40 mg) by mouth At Bedtime 90 tablet 0    traZODone (DESYREL) 50 MG tablet Take 0.5 tablets (25 mg) by mouth at bedtime 90 tablet 0    triamcinolone (KENALOG) 0.1 % external cream Apply  topically 2 times daily. (Patient not taking: Reported on 6/28/2023) 30 g 11    UNABLE TO FIND MEDICATION NAME: Qunol         Medications from List 1 of the standing order (on medications that exclude the use of Paxlovid) that patient is taking: NONE. Is patient taking [place] Wort? No  Is patient taking [place] Wort or any meds from List 1? No.   Medications from List 2 of the standing order (on meds that provider needs to adjust) that patient is taking: NONE. Is patient on any of the meds from List 2? No.   Medications from List 3 of standing order (on meds that a RN needs to adjust) that patient is taking: simvastatin (Zocor,  FloLipid): Instructed patient to stop taking simvastatin while taking Paxlovid and first dose of Paxlovid must be at least 12 hours after last dose of simvastatin.  Instructed to restart simvastatin 5 days after the completion of Paxlovid.   trazodone (Desyrel): Instructed patient to stop taking trazodone while taking Paxlovid and restart trazodone 3 days after the completion of Paxlovid.  Is patient on any meds from List 3? Yes. Patient is on meds from list 3. No meds require a provider visit and at least one med required RN to adjust.     Paxlovid has an approximate 90% reduction in hospitalization. Paxlovid can possibly cause altered sense of taste, diarrhea (loose, watery stools), high blood pressure, muscle aches.     Would patient like a Paxlovid prescription?   Yes.   Lab Results   Component Value Date    GFRESTIMATED 76 12/22/2023       Was last eGFR reduced? No, eGFR 60 or greater/ No Result on record. Patient can receive the normal renal function dose. Paxlovid Rx sent to [facility]   [facility]    Temporary change to home medications: patient is to hold Simvastatin while on Paxlovid and resume 5 days after completing Paxlovid, and hold Trazadone while on Paxlovid and resume 3 days after completing Paxlovid    All medication adjustments (holds, etc) were discussed with the patient and patient was asked to repeat back (teachback) their med adjustment.  Did patient understand med adjustment? Yes, patient repeated back and understood correctly.      Reviewed the following instructions with the patient:    Paxlovid (nimatrelvir and ritonavir)    How it works  Two medicines (nirmatrelvir and ritonavir) are taken together. They stop the virus from growing. Less amount of virus is easier for your body to fight.    How to take  Medicine comes in a daily container with both medicine tablets. Take by mouth twice daily (once in the morning, once at night) for 5 days.  The number of  tablets to take varies by patient.  Don't chew or break capsules. Swallow whole.    When to take  Take as soon as possible after positive COVID-19 test result, and within 5 days of your first symptoms.    Possible side effects  Can cause altered sense of taste, diarrhea (loose, watery stools), high blood pressure, muscle aches.        Karina Sepulveda RN

## 2024-07-31 ENCOUNTER — TELEPHONE (OUTPATIENT)
Dept: FAMILY MEDICINE | Facility: CLINIC | Age: 79
End: 2024-07-31
Payer: COMMERCIAL

## 2024-07-31 NOTE — TELEPHONE ENCOUNTER
Spoke to patient and relayed provider message. Patient verbalized understanding of the message. No questions at this time. RN updated appt notes for upcoming appt.    Anisha Hurst RN

## 2024-07-31 NOTE — TELEPHONE ENCOUNTER
Called patient. Patient stated that she is having a block tear duct undone. Is provider okay with still having patient doing the pre-op on 8/5?    Kathya THOMAS RN  [facility] Triage Team

## 2024-07-31 NOTE — TELEPHONE ENCOUNTER
Pt has appt on 8/5 to est care with provider, pt recently moved to the area. Pt reports having an eye surgery on 9/25/24. She states that the Ophthalmologist states that pt can have pre op done on 8/5/24 appt does not need to be within 30 days. Please advise if this is okay or if pt will need to make additional appt.    Pt also requesting to have ears cleaned out at Monday's appt.     Pt tested positive for covid on 7/26/24. Pt advised to wear mask on 8/5 appt. Is provider okay for pt to keep appt?    Valery Loera RN

## 2024-08-05 ENCOUNTER — OFFICE VISIT (OUTPATIENT)
Dept: FAMILY MEDICINE | Facility: CLINIC | Age: 79
End: 2024-08-05
Payer: COMMERCIAL

## 2024-08-05 VITALS
WEIGHT: 146 LBS | HEART RATE: 75 BPM | DIASTOLIC BLOOD PRESSURE: 64 MMHG | RESPIRATION RATE: 18 BRPM | TEMPERATURE: 98 F | HEIGHT: 60 IN | SYSTOLIC BLOOD PRESSURE: 115 MMHG | OXYGEN SATURATION: 98 % | BODY MASS INDEX: 28.66 KG/M2

## 2024-08-05 DIAGNOSIS — H61.20 WAX IN EAR: Primary | ICD-10-CM

## 2024-08-05 DIAGNOSIS — F32.5 MAJOR DEPRESSIVE DISORDER IN REMISSION, UNSPECIFIED WHETHER RECURRENT (H): ICD-10-CM

## 2024-08-05 PROCEDURE — 69210 REMOVE IMPACTED EAR WAX UNI: CPT | Performed by: FAMILY MEDICINE

## 2024-08-05 PROCEDURE — 99213 OFFICE O/P EST LOW 20 MIN: CPT | Mod: 25 | Performed by: FAMILY MEDICINE

## 2024-08-05 ASSESSMENT — PAIN SCALES - GENERAL: PAINLEVEL: NO PAIN (0)

## 2024-08-05 NOTE — NURSING NOTE
Patient identified using two patient identifiers.  Ear exam showing wax occlusion completed by provider.  Solution: warm water was placed in the bilateral ear(s) via irrigation tool: elephant ear    Pt tolerated well, able to clear both ears, no complaints or complications.     Daisy Vargas RN on 8/5/2024 at 9:13 AM

## 2024-08-05 NOTE — PROGRESS NOTES
"  Assessment & Plan     Major depressive disorder in remission, unspecified whether recurrent (H24)  Stable     Wax in ear  On both side, removed with ENT alligator forceps and flushed with water without complication   - REMOVAL OF IMPACTED WAX MD          BMI  Estimated body mass index is 28.99 kg/m  as calculated from the following:    Height as of this encounter: 1.511 m (4' 11.5\").    Weight as of this encounter: 66.2 kg (146 lb).   Weight management plan: Discussed healthy diet and exercise guidelines      FUTURE APPOINTMENTS:       - Follow-up visit in 4 months for CPE, will recheck hot flush and consider adding thyroid function test     Subjective   Camelia is a 79 year old, presenting for the following health issues:  [facility]        8/5/2024     8:33 AM   Additional Questions   Roomed by Obey     History of Present Illness       She eats 2-3 servings of fruits and vegetables daily.She consumes 1 sweetened beverage(s) daily.She exercises with enough effort to increase her heart rate 30 to 60 minutes per day.  She exercises with enough effort to increase her heart rate 4 days per week.   She is taking medications regularly.         Review of Systems  Constitutional, HEENT, cardiovascular, pulmonary, GI, , musculoskeletal, neuro, skin, endocrine and psych systems are negative, except as otherwise noted.      Objective    /64   Pulse 75   Temp 98  F (36.7  C) (Temporal)   Resp 18   Ht 1.511 m (4' 11.5\")   Wt 66.2 kg (146 lb)   SpO2 98%   BMI 28.99 kg/m    Body mass index is 28.99 kg/m .  Physical Exam   GENERAL: alert and no distress  NECK: no adenopathy, no asymmetry, masses, or scars  RESP: lungs clear to auscultation - no rales, rhonchi or wheezes  CV: regular rate and rhythm, normal S1 S2, no S3 or S4, no murmur, click or rub, no peripheral edema  ABDOMEN: soft, nontender, no hepatosplenomegaly, no masses and bowel sounds normal  MS: no gross musculoskeletal defects noted, no " edema            Signed Electronically by: Kody Downing MD

## 2024-08-05 NOTE — PROGRESS NOTES
{PROVIDER CHARTING PREFERENCE:109631}    Loni Denise is a 79 year old, presenting for the following health issues:  No chief complaint on file.  {(!) Visit Details have not yet been documented.  Please enter Visit Details and then use this list to pull in documentation. (Optional):733918}  History of Present Illness       Reason for visit:  Establish care/preop physical    She eats 2-3 servings of fruits and vegetables daily.She consumes 1 sweetened beverage(s) daily.She exercises with enough effort to increase her heart rate 30 to 60 minutes per day.  She exercises with enough effort to increase her heart rate 4 days per week.   She is taking medications regularly.       {MA/LPN/RN Pre-Provider Visit Orders- hCG/UA/Strep (Optional):881511}  {SUPERLIST (Optional):642692}  {additonal problems for provider to add (Optional):031494}    {ROS Picklists (Optional):007851}      Objective    There were no vitals taken for this visit.  There is no height or weight on file to calculate BMI.  Physical Exam   {Exam List (Optional):515043}    {Diagnostic Test Results (Optional):859330}        Signed Electronically by: Kody Downing MD  {Email feedback regarding this note to [email or web address]   :191553}

## 2024-09-18 ENCOUNTER — OFFICE VISIT (OUTPATIENT)
Dept: FAMILY MEDICINE | Facility: CLINIC | Age: 79
End: 2024-09-18
Payer: COMMERCIAL

## 2024-09-18 VITALS
HEART RATE: 73 BPM | SYSTOLIC BLOOD PRESSURE: 103 MMHG | TEMPERATURE: 97.7 F | DIASTOLIC BLOOD PRESSURE: 67 MMHG | RESPIRATION RATE: 18 BRPM | HEIGHT: 59 IN | BODY MASS INDEX: 30.12 KG/M2 | OXYGEN SATURATION: 98 % | WEIGHT: 149.4 LBS

## 2024-09-18 DIAGNOSIS — H04.551 ACQUIRED STENOSIS OF RIGHT NASOLACRIMAL DUCT: ICD-10-CM

## 2024-09-18 DIAGNOSIS — Z01.818 PRE-OP EXAM: Primary | ICD-10-CM

## 2024-09-18 DIAGNOSIS — Z23 NEED FOR VACCINATION: ICD-10-CM

## 2024-09-18 PROCEDURE — 90480 ADMN SARSCOV2 VAC 1/ONLY CMP: CPT | Performed by: PHYSICIAN ASSISTANT

## 2024-09-18 PROCEDURE — 90662 IIV NO PRSV INCREASED AG IM: CPT | Performed by: PHYSICIAN ASSISTANT

## 2024-09-18 PROCEDURE — 91320 SARSCV2 VAC 30MCG TRS-SUC IM: CPT | Performed by: PHYSICIAN ASSISTANT

## 2024-09-18 PROCEDURE — 99213 OFFICE O/P EST LOW 20 MIN: CPT | Mod: 25 | Performed by: PHYSICIAN ASSISTANT

## 2024-09-18 PROCEDURE — G0008 ADMIN INFLUENZA VIRUS VAC: HCPCS | Performed by: PHYSICIAN ASSISTANT

## 2024-09-18 PROCEDURE — 93000 ELECTROCARDIOGRAM COMPLETE: CPT | Performed by: PHYSICIAN ASSISTANT

## 2024-09-18 ASSESSMENT — PAIN SCALES - GENERAL: PAINLEVEL: NO PAIN (0)

## 2024-09-18 NOTE — PROGRESS NOTES
Preoperative Evaluation  [address] [address]  Phone: [phone]  Primary Provider: Tracey Pitts MD  Pre-op Performing Provider: Zakia Cannon PA-C  Sep 18, 2024             9/18/2024   Surgical Information   What procedure is being done? Right tear duct surgery    Facility or Hospital where procedure/surgery will be performed: [facility]   Who is doing the procedure / surgery? Dr TOÑITO Shannon   Date of surgery / procedure: 09/25/24   Time of surgery / procedure: TBD   Where do you plan to recover after surgery? at home with family        Fax number for surgical facility: [phone]    Assessment & Plan     The proposed surgical procedure is considered LOW risk.      ICD-10-CM    1. Pre-op exam  Z01.818 EKG 12-lead complete w/read - Clinics      2. Acquired stenosis of right nasolacrimal duct  H04.551       3. Need for vaccination  Z23 INFLUENZA HIGH DOSE, TRIVALENT, PF (FLUZONE)     COVID-19 12+ (PFIZER)           - No identified additional risk factors other than previously addressed    Antiplatelet or Anticoagulation Medication Instructions   - Patient is on no antiplatelet or anticoagulation medications.    Additional Medication Instructions  Take all scheduled medications on the day of surgery EXCEPT for modifications listed below:   - Statins: Continue taking on the day of surgery.    - SSRIs, SNRIs, TCAs, Antipsychotics: Continue without modification.     Recommendation  Approval given to proceed with proposed procedure, without further diagnostic evaluation.    Loni Denise is a 79 year old, presenting for the following:  Pre-Op Exam (Right tear duct surgery )        9/18/2024    10:01 AM   Additional Questions   Roomed by Vinicius REYES related to upcoming procedure: patient with acquired stenosis of Rt nasolacrimal duct, undergoing surgical correction as above. Presents today for pre-op  exam.        9/18/2024   Pre-Op Questionnaire   Have you ever had a heart attack or stroke? No   Have you ever had surgery on your heart or blood vessels, such as a stent placement, a coronary artery bypass, or surgery on an artery in your head, neck, heart, or legs? No   Do you have chest pain with activity? No   Do you have a history of heart failure? No   Do you currently have a cold, bronchitis or symptoms of other infection? No   Do you have a cough, shortness of breath, or wheezing? No   Do you or anyone in your family have previous history of blood clots? No   Do you or does anyone in your family have a serious bleeding problem such as prolonged bleeding following surgeries or cuts? No   Have you ever had problems with anemia or been told to take iron pills? No   Have you had any abnormal blood loss such as black, tarry or bloody stools, or abnormal vaginal bleeding? No   Have you ever had a blood transfusion? No   Are you willing to have a blood transfusion if it is medically needed before, during, or after your surgery? Yes   Have you or any of your relatives ever had problems with anesthesia? No   Do you have sleep apnea, excessive snoring or daytime drowsiness? No   Do you have any artifical heart valves or other implanted medical devices like a pacemaker, defibrillator, or continuous glucose monitor? No   Do you have artificial joints? (!) YES - s/p Rt TKA   Are you allergic to latex? No        Health Care Directive  Patient has a Health Care Directive on file      Preoperative Review of    reviewed - no record of controlled substances prescribed.      Status of Chronic Conditions:  See problem list for active medical problems.  Problems all longstanding and stable, except as noted/documented.  See ROS for pertinent symptoms related to these conditions.    Patient Active Problem List    Diagnosis Date Noted    Midline cystocele 05/12/2023     Priority: Medium    Major depressive disorder in  remission, unspecified whether recurrent (H24) 02/09/2022     Priority: Medium    Eczema of both hands 09/08/2016     Priority: Medium    Chronic rhinitis 06/30/2011     Priority: Medium    HYPERLIPIDEMIA LDL GOAL <130 10/31/2010     Priority: Medium    Postmenopausal atrophic vaginitis 10/10/2007     Priority: Medium    Candidiasis of vulva and vagina 10/10/2007     Priority: Medium    Dysplasia of cervix 05/02/2005     Priority: Medium     S/p TVH; needs yearly pap testing  [id number]  Problem list name updated by automated process. Provider to review      Symptomatic menopausal or female climacteric states 05/02/2005     Priority: Medium      Past Medical History:   Diagnosis Date    Arthritis     Contact dermatitis and other eczema, due to unspecified cause     Dysplasia of cervix, unspecified 2005     Past Surgical History:   Procedure Laterality Date    ARTHROPLASTY KNEE Right 2/23/2023    Procedure: TOTAL Knee Arthroplasty, Right;  Surgeon: Charlie Escamilla MD;  Location: [facility] OR    HC CORRECT BUNION,SIMPLE  05/23/1990    Right foot    HC REMOVAL OF TONSILS,<11 Y/O      HYSTERECTOMY, PAP NO LONGER INDICATED  04/2005    HYSTERECTOMY, VAGINAL  04/2005    PHACOEMULSIFICATION WITH STANDARD INTRAOCULAR LENS IMPLANT Right 06/21/2021    Procedure: Right eye Cataract Extraction with Implant;  Surgeon: Alexander Mendoza MD;  Location: [facility] OR    PHACOEMULSIFICATION WITH STANDARD INTRAOCULAR LENS IMPLANT Left 07/21/2021    Procedure: Cataract Extraction with Implant, left eye;  Surgeon: Alexander Mendoza MD;  Location: [facility] OR    SURGICAL HISTORY OF -   06/19/1995    Bunion deformity & fractured tibial sesamoid left foot    ZZC APPENDECTOMY       Current Outpatient Medications   Medication Sig Dispense Refill    Probiotic Product (PROBIOTIC PO)       simvastatin (ZOCOR) 40 MG tablet Take 1 tablet (40 mg) by mouth At Bedtime 90 tablet 0    traZODone (DESYREL) 50 MG tablet Take 0.5 tablets (25 mg) by  "mouth at bedtime 90 tablet 0    triamcinolone (KENALOG) 0.1 % external cream Apply  topically 2 times daily. (Patient not taking: Reported on 6/28/2023) 30 g 11       Allergies   Allergen Reactions    Seasonal Allergies         Social History     Tobacco Use    Smoking status: Never     Passive exposure: Never    Smokeless tobacco: Never   Substance Use Topics    Alcohol use: Yes     Comment: occasional wine       History   Drug Use No             Review of Systems  Constitutional, HEENT, cardiovascular, pulmonary, GI, , musculoskeletal, neuro, skin, endocrine and psych systems are negative, except as otherwise noted.    Objective    /67   Pulse 73   Temp 97.7  F (36.5  C) (Tympanic)   Resp 18   Ht 1.493 m (4' 10.78\")   Wt 67.8 kg (149 lb 6.4 oz)   SpO2 98%   BMI 30.40 kg/m     Estimated body mass index is 30.4 kg/m  as calculated from the following:    Height as of this encounter: 1.493 m (4' 10.78\").    Weight as of this encounter: 67.8 kg (149 lb 6.4 oz).  Physical Exam  GENERAL:  WDWN, no acute distress  PSYCH: pleasant, cooperative  EYES: no discharge, no injection  HENT:  Normocephalic. Moist mucus membranes. Ear canals clear, TMs pearly gray w/o effusion. Oropharynx pink, uvula midline.  NECK:  Supple, symmetric  LUNGS:  Clear to auscultation bilaterally without rhonchi, rales, or wheeze. Chest rise symmetric and no tenderness to palpation.  HEART:  Regular rate & rhythm. No murmur, gallop, or rub.  EXTREMITIES:  No gross deformities, moves all 4 limbs spontaneously, no peripheral edema  SKIN:  Warm and dry, no rash or suspicious lesions    NEUROLOGIC:  Alert, sensation grossly intact.    Recent Labs   Lab Test 12/22/23  1032      POTASSIUM 4.5   CR 0.80        Diagnostics  No labs were ordered during this visit.   EKG: appears normal, NSR, normal axis, normal intervals, no acute ST/T changes c/w ischemia, no LVH by voltage criteria, unchanged from previous tracings    Revised Cardiac " Risk Index (RCRI)  The patient has the following serious cardiovascular risks for perioperative complications:   - No serious cardiac risks = 0 points     RCRI Interpretation: 0 points: Class I (very low risk - 0.4% complication rate)         Signed Electronically by: Zakia Cannon PA-C  A copy of this evaluation report is provided to the requesting physician.

## 2024-10-25 ENCOUNTER — TELEPHONE (OUTPATIENT)
Dept: FAMILY MEDICINE | Facility: CLINIC | Age: 79
End: 2024-10-25
Payer: COMMERCIAL

## 2024-10-25 ENCOUNTER — TELEPHONE (OUTPATIENT)
Dept: DERMATOLOGY | Facility: CLINIC | Age: 79
End: 2024-10-25
Payer: COMMERCIAL

## 2024-10-25 DIAGNOSIS — E78.5 HYPERLIPIDEMIA LDL GOAL <130: ICD-10-CM

## 2024-10-25 RX ORDER — SIMVASTATIN 40 MG
40 TABLET ORAL AT BEDTIME
Qty: 90 TABLET | Refills: 0 | Status: SHIPPED | OUTPATIENT
Start: 2024-10-25

## 2024-10-25 NOTE — TELEPHONE ENCOUNTER
Prescription approved per [facility] Refill Protocol.       Alondra Rodríguez RN  [facility]

## 2024-10-25 NOTE — TELEPHONE ENCOUNTER
[facility] Call Center    Phone Message    May a detailed message be left on voicemail: yes     Reason for Call: Symptoms or Concerns     If patient has red-flag symptoms, warm transfer to triage line    Current symptom or concern: skin lesions on face at different locations on face, both sides are affected. This is growing and keeps popping up.     Symptoms have been present for:  2-3  month(s)    Has patient previously been seen for this? No    By : NA    Date: NA    Are there any new or worsening symptoms? Yes: please review and call if needed. Pt scheduled for 06/03/25 and wait listed. Thanks     Action Taken: Message routed to:  Other: [place] - derm    Travel Screening: Not Applicable     Date of Service:

## 2024-10-28 NOTE — TELEPHONE ENCOUNTER
S/w pt who states she has pimple like lesions on both sides of her face that started about 2-3 months ago.  Denies itching, pain, or bleeding.  States the lesions look to be pus filled and has been applying neosporin with no relief.  Scheduled with Jenn Silva on Monday 11/4 at 3:15 pm at  [facility].    Elisa KEYS RN  [facility]  [phone]

## 2024-11-04 ENCOUNTER — OFFICE VISIT (OUTPATIENT)
Dept: DERMATOLOGY | Facility: CLINIC | Age: 79
End: 2024-11-04
Payer: COMMERCIAL

## 2024-11-04 DIAGNOSIS — L81.4 LENTIGINES: ICD-10-CM

## 2024-11-04 DIAGNOSIS — R23.8 INFLAMMATORY PAPULE: ICD-10-CM

## 2024-11-04 DIAGNOSIS — D18.01 CHERRY ANGIOMA: ICD-10-CM

## 2024-11-04 DIAGNOSIS — L30.9 ECZEMA OF BOTH HANDS: ICD-10-CM

## 2024-11-04 DIAGNOSIS — D22.9 MULTIPLE BENIGN NEVI: ICD-10-CM

## 2024-11-04 DIAGNOSIS — L82.1 SEBORRHEIC KERATOSIS: Primary | ICD-10-CM

## 2024-11-04 RX ORDER — TRIAMCINOLONE ACETONIDE 1 MG/G
CREAM TOPICAL
Qty: 30 G | Refills: 11 | Status: CANCELLED | OUTPATIENT
Start: 2024-11-04

## 2024-11-04 ASSESSMENT — PAIN SCALES - GENERAL: PAINLEVEL_OUTOF10: NO PAIN (0)

## 2024-11-04 NOTE — PROGRESS NOTES
[facility] Dermatology Note  Encounter Date: Nov 4, 2024  Office Visit     Dermatology Problem List:  FBSE 11/4/24  1. Solar elastosis, right zygomatic cheek inferior and superior, s/p biopsy 11/10/2015  2. Ruptured folliculitis  - right nasolabial fold, s/p biopsy 6/25/2019  3. Inflammatory papule  - metronidazole 0.75% cream  4. Eczema  - TMC 0.1% cream  ____________________________________________    Assessment & Plan:  # Inflammatory papule, face  - Start metronidazole 0.75% cream for face    # Eczema, both hands- flaring today  - Refilled TMC 0.1% cream  - continue emollients, rafy after hand washing    # Benign findings: multiple benign nevi, lentigines, cherry angiomas, SKs  - edu on benign etiology  - Signs and Symptoms of non-melanoma skin cancer and ABCDEs of melanoma reviewed with patient. Patient encouraged to perform monthly self skin exams and educated on how to perform them. UV precautions reviewed with patient. Patient was asked about new or changing moles/lesions on body.   - Sunscreen: Apply 20 minutes prior to going outdoors and reapply every two hours, when wet or sweating. We recommend using an SPF 30 or higher, and to use one that is water resistant.     - RTC for changes    Procedures Performed:   None.    Follow-up: 1 year(s) in-person, or earlier for new or changing lesions    Staff and Scribe:     Scribe Disclosure:   I, EVANGELISTA MAHAN, am serving as a scribe; to document services personally performed by Jenn Silva PA-C-based on data collection and the provider's statements to me.  Provider Disclosure:   The documentation recorded by the scribe accurately reflects the services I personally performed and the decisions made by me.    All risks, benefits and alternatives were discussed with patient.  Patient is in agreement and understands the assessment and plan.  All questions were answered.    Jenn Silva PA-C, [facility]  [facility]  [facility]: Phone: [phone], Fax: [phone]  [facility]: Phone: [phone],  Fax: [phone]  [facility] [place]: Phone: [phone], Fax: [phone]  ____________________________________________    CC: Derm Problem (Spots on face)    HPI:  Ms. Camelia Bangura is a(n) 79 year old female who presents today as a new patient for a spot on her face.     Today, the patient mentions a large red spot that developed a white head that is constantly recurring. About 1 inch of white stuff came out. She applied neosporin or bacitracin, without improvement.     She mentions having eczema since childhood.     Patient is otherwise feeling well, without additional skin concerns.    Labs Reviewed:  N/A    Physical Exam:  Vitals: There were no vitals taken for this visit.  SKIN: Full skin, which includes the head/face, both arms, chest, back, abdomen,both legs, genitalia and/or groin buttocks, digits and/or nails, was examined.  - Rosenbaum's skin type II, <100 nevi  - There are dome shaped bright red papules on the trunk.   - Multiple regular brown pigmented macules and papules are identified on the trunk and extremities.   - Scattered brown macules on sun exposed areas.  - There are waxy stuck on tan to brown papules on the trunk.   - Inflammatory papule on the face.   - There are pink scaly patches and plaques on the hands.  - No other lesions of concern on areas examined.     Medications:  Current Outpatient Medications   Medication Sig Dispense Refill    Probiotic Product (PROBIOTIC PO)       simvastatin (ZOCOR) 40 MG tablet Take 1 tablet (40 mg) by mouth at bedtime. 90 tablet 0    traZODone (DESYREL) 50 MG tablet Take 0.5 tablets (25 mg) by mouth at bedtime 90 tablet 0    triamcinolone (KENALOG) 0.1 % external cream Apply  topically 2 times daily. 30 g 11     Current Facility-Administered Medications   Medication Dose Route Frequency Provider Last  Rate Last Admin    lidocaine 1 % injection 0.5 mL  0.5 mL   Jorge Arias DO   0.5 mL at 11/21/23 1400    triamcinolone (KENALOG-40) injection 20 mg  20 mg   Jorge Arias DO   20 mg at 11/21/23 1400      Past Medical History:   Patient Active Problem List   Diagnosis    Dysplasia of cervix    Symptomatic menopausal or female climacteric states    Postmenopausal atrophic vaginitis    Candidiasis of vulva and vagina    HYPERLIPIDEMIA LDL GOAL <130    Chronic rhinitis    Eczema of both hands    Major depressive disorder in remission, unspecified whether recurrent (H)    Midline cystocele     Past Medical History:   Diagnosis Date    Arthritis     Contact dermatitis and other eczema, due to unspecified cause     Dysplasia of cervix, unspecified 2005

## 2024-11-04 NOTE — LETTER
11/4/2024      Camelia Bangura  [address]  [place] [address]      Dear Colleague,    Thank you for referring your patient, Camelia Bangura, to the [facility]. Please see a copy of my visit note below.    [facility] Dermatology Note  Encounter Date: Nov 4, 2024  Office Visit     Dermatology Problem List:  FBSE 11/4/24  1. Solar elastosis, right zygomatic cheek inferior and superior, s/p biopsy 11/10/2015  2. Ruptured folliculitis  - right nasolabial fold, s/p biopsy 6/25/2019  3. Inflammatory papule  - metronidazole 0.75% cream  4. Eczema  - TMC 0.1% cream  ____________________________________________    Assessment & Plan:  # Inflammatory papule, face  - Start metronidazole 0.75% cream for face    # Eczema, both hands- flaring today  - Refilled TMC 0.1% cream  - continue emollients, rafy after hand washing    # Benign findings: multiple benign nevi, lentigines, cherry angiomas, SKs  - edu on benign etiology  - Signs and Symptoms of non-melanoma skin cancer and ABCDEs of melanoma reviewed with patient. Patient encouraged to perform monthly self skin exams and educated on how to perform them. UV precautions reviewed with patient. Patient was asked about new or changing moles/lesions on body.   - Sunscreen: Apply 20 minutes prior to going outdoors and reapply every two hours, when wet or sweating. We recommend using an SPF 30 or higher, and to use one that is water resistant.     - RTC for changes    Procedures Performed:   None.    Follow-up: 1 year(s) in-person, or earlier for new or changing lesions    Staff and Scribe:     Scribe Disclosure:   I, EVANGELISTA MAHAN, am serving as a scribe; to document services personally performed by Jenn Silva PA-C-based on data collection and the provider's statements to me.  Provider Disclosure:   The documentation recorded by the scribe accurately reflects the services I personally performed and the decisions made by  me.    All risks, benefits and alternatives were discussed with patient.  Patient is in agreement and understands the assessment and plan.  All questions were answered.    Jenn Silva PA-C, MPAS  [facility] [place]: Phone: [phone], Fax: [phone]  [facility]: Phone: [phone],  Fax: [phone]  [facility]: Phone: [phone], Fax: [phone]  ____________________________________________    CC: Derm Problem (Spots on face)    HPI:  Ms. Camelia Bangura is a(n) 79 year old female who presents today as a new patient for a spot on her face.     Today, the patient mentions a large red spot that developed a white head that is constantly recurring. About 1 inch of white stuff came out. She applied neosporin or bacitracin, without improvement.     She mentions having eczema since childhood.     Patient is otherwise feeling well, without additional skin concerns.    Labs Reviewed:  N/A    Physical Exam:  Vitals: There were no vitals taken for this visit.  SKIN: Full skin, which includes the head/face, both arms, chest, back, abdomen,both legs, genitalia and/or groin buttocks, digits and/or nails, was examined.  - Rosenbaum's skin type II, <100 nevi  - There are dome shaped bright red papules on the trunk.   - Multiple regular brown pigmented macules and papules are identified on the trunk and extremities.   - Scattered brown macules on sun exposed areas.  - There are waxy stuck on tan to brown papules on the trunk.   - Inflammatory papule on the face.   - There are pink scaly patches and plaques on the hands.  - No other lesions of concern on areas examined.     Medications:  Current Outpatient Medications   Medication Sig Dispense Refill     Probiotic Product (PROBIOTIC PO)        simvastatin (ZOCOR) 40 MG tablet Take 1 tablet (40 mg) by mouth at bedtime. 90 tablet 0     traZODone (DESYREL) 50 MG  tablet Take 0.5 tablets (25 mg) by mouth at bedtime 90 tablet 0     triamcinolone (KENALOG) 0.1 % external cream Apply  topically 2 times daily. 30 g 11     Current Facility-Administered Medications   Medication Dose Route Frequency Provider Last Rate Last Admin     lidocaine 1 % injection 0.5 mL  0.5 mL   Jorge Arias DO   0.5 mL at 11/21/23 1400     triamcinolone (KENALOG-40) injection 20 mg  20 mg   Jorge Arias DO   20 mg at 11/21/23 1400      Past Medical History:   Patient Active Problem List   Diagnosis     Dysplasia of cervix     Symptomatic menopausal or female climacteric states     Postmenopausal atrophic vaginitis     Candidiasis of vulva and vagina     HYPERLIPIDEMIA LDL GOAL <130     Chronic rhinitis     Eczema of both hands     Major depressive disorder in remission, unspecified whether recurrent (H)     Midline cystocele     Past Medical History:   Diagnosis Date     Arthritis      Contact dermatitis and other eczema, due to unspecified cause      Dysplasia of cervix, unspecified 2005              Again, thank you for allowing me to participate in the care of your patient.        Sincerely,        Jenn Silva PA-C

## 2024-11-08 ENCOUNTER — TELEPHONE (OUTPATIENT)
Dept: DERMATOLOGY | Facility: CLINIC | Age: 79
End: 2024-11-08
Payer: COMMERCIAL

## 2024-11-08 DIAGNOSIS — L71.9 ROSACEA: Primary | ICD-10-CM

## 2024-11-08 DIAGNOSIS — L30.9 ECZEMA OF BOTH HANDS: ICD-10-CM

## 2024-11-08 NOTE — TELEPHONE ENCOUNTER
[facility] Call Center    Phone Message    May a detailed message be left on voicemail: yes     Reason for Call: Medication Refill Request    Has the patient contacted the pharmacy for the refill? Yes   Name of medication being requested: Ointment and triamcinolone (KENALOG-40) injection 20 mg  Provider who prescribed the medication: Jorge Arias  Pharmacy: [facility]. [place], [place] - [address] [place] [address]  Date medication is needed: asap      Action Taken: Message routed to:  Other: [facility]    Travel Screening: Not Applicable

## 2024-11-11 NOTE — TELEPHONE ENCOUNTER
Patient Contact    Attempt # 1    Was call answered?  No.  Left message on voicemail with information to call nurse back at [phone].     Per office notes from 11/4/24 pt was to start metronidazole 0.75% cream for face and Triamcinolone 0.1% cream for eczema on hands.    No rxs sent to pharmacy.    Elisa KEYS RN  [facility] [place]  [phone]

## 2024-11-12 ENCOUNTER — ANCILLARY PROCEDURE (OUTPATIENT)
Dept: GENERAL RADIOLOGY | Facility: CLINIC | Age: 79
End: 2024-11-12
Attending: FAMILY MEDICINE
Payer: COMMERCIAL

## 2024-11-12 ENCOUNTER — OFFICE VISIT (OUTPATIENT)
Dept: ORTHOPEDICS | Facility: CLINIC | Age: 79
End: 2024-11-12
Payer: COMMERCIAL

## 2024-11-12 VITALS — SYSTOLIC BLOOD PRESSURE: 126 MMHG | DIASTOLIC BLOOD PRESSURE: 74 MMHG | BODY MASS INDEX: 30.38 KG/M2 | HEIGHT: 59 IN

## 2024-11-12 DIAGNOSIS — M72.2 PLANTAR FASCIAL FIBROMATOSIS: ICD-10-CM

## 2024-11-12 DIAGNOSIS — M65.332 TRIGGER MIDDLE FINGER OF LEFT HAND: ICD-10-CM

## 2024-11-12 DIAGNOSIS — M65.331 TRIGGER MIDDLE FINGER OF RIGHT HAND: ICD-10-CM

## 2024-11-12 DIAGNOSIS — M25.571 PAIN IN JOINT INVOLVING RIGHT ANKLE AND FOOT: Primary | ICD-10-CM

## 2024-11-12 DIAGNOSIS — M25.571 PAIN IN JOINT INVOLVING RIGHT ANKLE AND FOOT: ICD-10-CM

## 2024-11-12 DIAGNOSIS — M72.0 DUPUYTREN'S CONTRACTURE OF LEFT HAND: ICD-10-CM

## 2024-11-12 PROCEDURE — 99213 OFFICE O/P EST LOW 20 MIN: CPT | Mod: 25 | Performed by: FAMILY MEDICINE

## 2024-11-12 PROCEDURE — 20550 NJX 1 TENDON SHEATH/LIGAMENT: CPT | Mod: F2 | Performed by: FAMILY MEDICINE

## 2024-11-12 PROCEDURE — 73630 X-RAY EXAM OF FOOT: CPT | Mod: TC | Performed by: RADIOLOGY

## 2024-11-12 RX ORDER — TRIAMCINOLONE ACETONIDE 40 MG/ML
20 INJECTION, SUSPENSION INTRA-ARTICULAR; INTRAMUSCULAR
Status: COMPLETED | OUTPATIENT
Start: 2024-11-12 | End: 2024-11-12

## 2024-11-12 RX ORDER — TRIAMCINOLONE ACETONIDE 1 MG/G
CREAM TOPICAL 2 TIMES DAILY
Qty: 80 G | Refills: 3 | Status: SHIPPED | OUTPATIENT
Start: 2024-11-12

## 2024-11-12 RX ORDER — LIDOCAINE HYDROCHLORIDE 10 MG/ML
0.5 INJECTION, SOLUTION INFILTRATION; PERINEURAL
Status: COMPLETED | OUTPATIENT
Start: 2024-11-12 | End: 2024-11-12

## 2024-11-12 RX ADMIN — TRIAMCINOLONE ACETONIDE 20 MG: 40 INJECTION, SUSPENSION INTRA-ARTICULAR; INTRAMUSCULAR at 11:13

## 2024-11-12 RX ADMIN — LIDOCAINE HYDROCHLORIDE 0.5 ML: 10 INJECTION, SOLUTION INFILTRATION; PERINEURAL at 11:13

## 2024-11-12 NOTE — TELEPHONE ENCOUNTER
Called and left message on pt's vm that prescriptions for metronidazole cream and triamcinolone cream were sent to the [facility] pharmacy for her.    Elisa KEYS RN  [facility] [place]  [phone]

## 2024-11-12 NOTE — LETTER
"2024      Camelia Bangura  [address]  [place] [address]      Dear Colleague,    Thank you for referring your patient, Camelia Bangura, to the [facility] [place]. Please see a copy of my visit note below.    Camelia Bangura  :  1945  DOS: 24  MRN: [id number]    Sports Medicine Clinic Visit    PCP: Tracey Pitts    Camelia Bangura is a 78 year old Left hand dominant female who is seen as a self referral presenting with left long finger pain.    Injury: Gradual onset of pain over the past 1+ years that is significantly worse over the past 2+ months.  Pain located over left long finger, nonradiating.  Additional Features:  Positive: weakness and contracture.  Symptoms are better with Rest and keeping finger straight.  Symptoms are worse with: finger flexion, gripping/grasping.  Other evaluation and/or treatments so far consists of: No Treatment tried to date.  Recent imaging completed: No recent imaging completed.  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  Left long finger A1 pulley injection completed on 23 provided relief for 10+ months.  Patient notes feeling of joint \"moving out of place\" with twisting/grasping movements.  No new injury in the interim.    Second complaint of right medial midfoot pain over the past 1+ months that initially started with her daily walks.  Pain is worse with prolonged walking/standing & being barefoot.  Currently treating with OTC pain medications as needed with some relief.  No prior imaging completed.  No previous history of right foot issues.      Review of Systems  Musculoskeletal: " "as above  Remainder of review of systems is negative including constitutional, CV, pulmonary, GI, Skin and Neurologic except as noted in HPI or medical history.    Past Medical History:   Diagnosis Date     Arthritis      Contact dermatitis and other eczema, due to unspecified cause      Dysplasia of cervix, unspecified 2005     Past Surgical History:   Procedure Laterality Date     ARTHROPLASTY KNEE Right 2/23/2023    Procedure: TOTAL Knee Arthroplasty, Right;  Surgeon: Charlie Escamilla MD;  Location: [facility] OR     HC CORRECT BUNION,SIMPLE  05/23/1990    Right foot     HC REMOVAL OF TONSILS,<15 Y/O       HYSTERECTOMY, PAP NO LONGER INDICATED  04/2005     HYSTERECTOMY, VAGINAL  04/2005     PHACOEMULSIFICATION WITH STANDARD INTRAOCULAR LENS IMPLANT Right 06/21/2021    Procedure: Right eye Cataract Extraction with Implant;  Surgeon: Alexander Mendoza MD;  Location: [facility] OR     PHACOEMULSIFICATION WITH STANDARD INTRAOCULAR LENS IMPLANT Left 07/21/2021    Procedure: Cataract Extraction with Implant, left eye;  Surgeon: Alexander Mendoza MD;  Location: [facility] OR     SURGICAL HISTORY OF -   06/19/1995    Bunion deformity & fractured tibial sesamoid left foot     ZZC APPENDECTOMY       Family History   Problem Relation Age of Onset     Cerebrovascular Disease Mother      Arthritis Mother      Osteoporosis Mother      Melanoma No family hx of        Objective  /74   Ht 1.494 m (4' 10.8\")   BMI 30.38 kg/m      General: healthy, alert and in no distress    HEENT: no scleral icterus or conjunctival erythema   Skin: no suspicious lesions or rash. No jaundice.   CV: regular rhythm by palpation, 2+ distal pulses, no pedal edema    Resp: normal respiratory effort without conversational dyspnea   Psych: normal mood and affect    Gait: nonantalgic, appropriate coordination and balance   Neuro: normal light touch sensory exam of the extremities. Motor strength as noted below     Bilateral Wrist and Hand " exam    Inspection:       No swelling, bruising or deformity left    Tender:       nodular swelling over the A-1 pulley of the 3rd digit(s) left > right    Non Tender:       Remainder of the Wrist and Hand left and right    ROM:       Decreased active and passive ROM of the 3rd MCP, PIP, and DIP with flexion left and right    Strength:       5/5 strength in the muscles of the hand, wrist and forearm left and right    Neurovascular:       2+ radial pulses bilaterally with brisk capillary refill and      normal sensation to light touch in the radial, median and ulnar nerve distributions    Right foot exam  Mild functional pes planus, 1st MTP joint osteophytes, TTP along the middle of the plantar fascia, with small nodules noted, no significant TTP of the proximal plantar fascia    Radiology:  Recent Results (from the past 744 hours)   XR Foot RT G/E 3 vw    Narrative    XR FOOT RIGHT G/E 3 VIEWS   11/12/2024 10:33 AM     HISTORY: Pain in joint involving right ankle and foot  COMPARISON: None.       Impression    IMPRESSION: Postoperative changes of first metatarsal and first  proximal phalangeal osteotomies with screw fixation. No hardware  failure. 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  Mild degenerative arthritis right mid foot. Plantar and Achilles  calcaneal spurs.    JOSÉ GRAHAM MD         SYSTEM ID:  [id number]       Hand / Upper Extremity Injection/Arthrocentesis: L long A1    Date/Time: 11/12/2024 11:13 AM    Performed by: Jorge Arias DO  Authorized by: Jorge Arias DO    Indications:  Pain and therapeutic  Needle Size:  25 G  Guidance: landmark    Approach:  Volar  Condition: trigger finger    Location:  Long finger    Site:  L long A1  Medications:  0.5 mL lidocaine 1 %; 20 mg triamcinolone 40 MG/ML  Outcome:  Tolerated well, no immediate complications  Procedure discussed: discussed risks, benefits, and alternatives    Consent Given by:  Patient  Timeout:  timeout called immediately prior to procedure    Prep: patient was prepped and draped in usual sterile fashion        Assessment:  1. Pain in joint involving right ankle and foot    2. Plantar fascial fibromatosis    3. Trigger middle finger of left hand    4. Trigger middle finger of right hand    5. Dupuytren's contracture of left hand        Plan:  Discussed the assessment with the patient.  Follow up: prn based on short term progress  Reviewed risk of recurrence, goals of conservative care  Oval-8 splint provided  CSI today for control of pain and inflammation of the left 3rd A1 pulley  Reviewed cannot inject around this tendon more than twice in a lifetime  Hand surgery referral would be available if needed based on short and long term progress  Also very early signs of Duypuytren's disease in hands, L>R, will watch closely and consider referral to hand surgeon if needed  Also with increased foot pain, signs of plantar fibromas, also early and mild, footwear options and strategies reviewed  Carbon fiber foot plate option for the right shoe reviewed  XR images independently visualized and reviewed with patient today in clinic  Expectations and goals of CSI reviewed  Often 2-3 days for steroid effect, and can take up to two weeks for maximum effect  We discussed modified progressive pain-free activity as tolerated  Do not overuse in first two weeks if feeling better due to concern for vulnerability while steroid is working  Supportive care reviewed  All questions were answered today  Contact us with additional questions or concerns  Signs and sx of concern reviewed      Jorge Arias DO, PARAMJIT  Sports Medicine Physician  [facility]          Disclaimer: This note consists of symbols derived from keyboarding, dictation and/or voice recognition software. As a result, there may be errors in the script that have gone undetected. Please consider this when interpreting information found  in this chart.      Again, thank you for allowing me to participate in the care of your patient.        Sincerely,        Jorge Arias, DO

## 2024-11-12 NOTE — TELEPHONE ENCOUNTER
[facility] Call Center    Phone Message    May a detailed message be left on voicemail: yes     Reason for Call: Other: Pt states she is trying to call back and is upset because her Rx's weren't sent in at her last visit. Please call back today if possible. Thank you.      Action Taken: Other: [facility]    Travel Screening: Not Applicable     Date of Service:

## 2024-11-12 NOTE — PROGRESS NOTES
"Camelia Bangura  :  1945  DOS: 24  MRN: [id number]    Sports Medicine Clinic Visit    PCP: Tracey Pitts    Camelia Bangura is a 78 year old Left hand dominant female who is seen as a self referral presenting with left long finger pain.    Injury: Gradual onset of pain over the past 1+ years that is significantly worse over the past 2+ months.  Pain located over left long finger, nonradiating.  Additional Features:  Positive: weakness and contracture.  Symptoms are better with Rest and keeping finger straight.  Symptoms are worse with: finger flexion, gripping/grasping.  Other evaluation and/or treatments so far consists of: No Treatment tried to date.  Recent imaging completed: No recent imaging completed.  Prior History of related problems: none - history of left hand trigger thumb in  that improved with splinting    Social History: retired  Performs Performable art    Interim History - 2024  Since last visit on 23 patient has bilateral long finger joint stiffness, pain with gripping & finger flexion over past 4+ weeks, left>>>right.  Left long finger A1 pulley injection completed on 23 provided relief for 10+ months.  Patient notes feeling of joint \"moving out of place\" with twisting/grasping movements.  No new injury in the interim.    Second complaint of right medial midfoot pain over the past 1+ months that initially started with her daily walks.  Pain is worse with prolonged walking/standing & being barefoot.  Currently treating with OTC pain medications as needed with some relief.  No prior imaging completed.  No previous history of right foot issues.      Review of Systems  Musculoskeletal: as above  Remainder of review of systems is negative including constitutional, CV, pulmonary, GI, Skin and Neurologic except as noted in HPI or medical history.    Past Medical History:   Diagnosis Date    Arthritis     Contact dermatitis and other eczema, " "due to unspecified cause     Dysplasia of cervix, unspecified 2005     Past Surgical History:   Procedure Laterality Date    ARTHROPLASTY KNEE Right 2/23/2023    Procedure: TOTAL Knee Arthroplasty, Right;  Surgeon: Charlie Escamilla MD;  Location: [facility]    HC CORRECT BUNION,SIMPLE  05/23/1990    Right foot    HC REMOVAL OF TONSILS,<15 Y/O      HYSTERECTOMY, PAP NO LONGER INDICATED  04/2005    HYSTERECTOMY, VAGINAL  04/2005    PHACOEMULSIFICATION WITH STANDARD INTRAOCULAR LENS IMPLANT Right 06/21/2021    Procedure: Right eye Cataract Extraction with Implant;  Surgeon: Alexander Mendoza MD;  Location: [facility]    PHACOEMULSIFICATION WITH STANDARD INTRAOCULAR LENS IMPLANT Left 07/21/2021    Procedure: Cataract Extraction with Implant, left eye;  Surgeon: Alexander Mendoza MD;  Location: [facility]    SURGICAL HISTORY OF -   06/19/1995    Bunion deformity & fractured tibial sesamoid left foot    ZZC APPENDECTOMY       Family History   Problem Relation Age of Onset    Cerebrovascular Disease Mother     Arthritis Mother     Osteoporosis Mother     Melanoma No family hx of        Objective  /74   Ht 1.494 m (4' 10.8\")   BMI 30.38 kg/m      General: healthy, alert and in no distress    HEENT: no scleral icterus or conjunctival erythema   Skin: no suspicious lesions or rash. No jaundice.   CV: regular rhythm by palpation, 2+ distal pulses, no pedal edema    Resp: normal respiratory effort without conversational dyspnea   Psych: normal mood and affect    Gait: nonantalgic, appropriate coordination and balance   Neuro: normal light touch sensory exam of the extremities. Motor strength as noted below     Bilateral Wrist and Hand exam    Inspection:       No swelling, bruising or deformity left    Tender:       nodular swelling over the A-1 pulley of the 3rd digit(s) left > right    Non Tender:       Remainder of the Wrist and Hand left and right    ROM:       Decreased active and passive ROM of the 3rd MCP, " PIP, and DIP with flexion left and right    Strength:       5/5 strength in the muscles of the hand, wrist and forearm left and right    Neurovascular:       2+ radial pulses bilaterally with brisk capillary refill and      normal sensation to light touch in the radial, median and ulnar nerve distributions    Right foot exam  Mild functional pes planus, 1st MTP joint osteophytes, TTP along the middle of the plantar fascia, with small nodules noted, no significant TTP of the proximal plantar fascia    Radiology:  Recent Results (from the past 744 hours)   XR Foot RT G/E 3 vw    Narrative    XR FOOT RIGHT G/E 3 VIEWS   11/12/2024 10:33 AM     HISTORY: Pain in joint involving right ankle and foot  COMPARISON: None.       Impression    IMPRESSION: Postoperative changes of first metatarsal and first  proximal phalangeal osteotomies with screw fixation. No hardware  failure. 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  Mild degenerative arthritis right mid foot. Plantar and Achilles  calcaneal spurs.    JOSÉ GRAHAM MD         SYSTEM ID:  [id number]       Hand / Upper Extremity Injection/Arthrocentesis: L long A1    Date/Time: 11/12/2024 11:13 AM    Performed by: Jorge Arias DO  Authorized by: Jorge Arias DO    Indications:  Pain and therapeutic  Needle Size:  25 G  Guidance: landmark    Approach:  Volar  Condition: trigger finger    Location:  Long finger    Site:  L long A1  Medications:  0.5 mL lidocaine 1 %; 20 mg triamcinolone 40 MG/ML  Outcome:  Tolerated well, no immediate complications  Procedure discussed: discussed risks, benefits, and alternatives    Consent Given by:  Patient  Timeout: timeout called immediately prior to procedure    Prep: patient was prepped and draped in usual sterile fashion        Assessment:  1. Pain in joint involving right ankle and foot    2. Plantar fascial fibromatosis    3. Trigger middle finger of left hand    4. Trigger middle finger of  right hand    5. Dupuytren's contracture of left hand        Plan:  Discussed the assessment with the patient.  Follow up: prn based on short term progress  Reviewed risk of recurrence, goals of conservative care  Oval-8 splint provided  CSI today for control of pain and inflammation of the left 3rd A1 pulley  Reviewed cannot inject around this tendon more than twice in a lifetime  Hand surgery referral would be available if needed based on short and long term progress  Also very early signs of Duypuytren's disease in hands, L>R, will watch closely and consider referral to hand surgeon if needed  Also with increased foot pain, signs of plantar fibromas, also early and mild, footwear options and strategies reviewed  Carbon fiber foot plate option for the right shoe reviewed  XR images independently visualized and reviewed with patient today in clinic  Expectations and goals of CSI reviewed  Often 2-3 days for steroid effect, and can take up to two weeks for maximum effect  We discussed modified progressive pain-free activity as tolerated  Do not overuse in first two weeks if feeling better due to concern for vulnerability while steroid is working  Supportive care reviewed  All questions were answered today  Contact us with additional questions or concerns  Signs and sx of concern reviewed      Jroge Arias DO, PARAMJIT  Sports Medicine Physician  [facility]          Disclaimer: This note consists of symbols derived from keyboarding, dictation and/or voice recognition software. As a result, there may be errors in the script that have gone undetected. Please consider this when interpreting information found in this chart.

## 2024-11-12 NOTE — TELEPHONE ENCOUNTER
S/w pt who states Jenn did not send in her prescriptions for her face or hands from appt on 10/28/24.  Pt uses [facility] pharmacy in [place].    Advised will send message to provider and call when rxs were sent.    Elisa KEYS RN  [facility]  [phone]

## 2024-11-20 ENCOUNTER — OFFICE VISIT (OUTPATIENT)
Dept: OBGYN | Facility: CLINIC | Age: 79
End: 2024-11-20
Payer: COMMERCIAL

## 2024-11-20 VITALS
SYSTOLIC BLOOD PRESSURE: 112 MMHG | DIASTOLIC BLOOD PRESSURE: 64 MMHG | HEART RATE: 66 BPM | RESPIRATION RATE: 16 BRPM | TEMPERATURE: 97.8 F | HEIGHT: 59 IN | BODY MASS INDEX: 30.38 KG/M2

## 2024-11-20 DIAGNOSIS — Z46.89 PESSARY MAINTENANCE: Primary | ICD-10-CM

## 2024-11-20 DIAGNOSIS — R15.1 FECAL SMEARING: ICD-10-CM

## 2024-11-20 NOTE — PROGRESS NOTES
"Pessary follow-up visit.    Camelia Bangura is not complaining of any problems with the pessary. She is here for it to be removed, cleaned and reinserted. She is also wondering about rectal incontinence.  She only has incontinence of stool after a bowel movement and has to clean up several times.  This started after having a hemorrhoid removed years ago.      /64 (BP Location: Left arm, Patient Position: Chair, Cuff Size: Adult Regular)   Pulse 66   Temp 97.8  F (36.6  C) (Tympanic)   Resp 16   Ht 1.494 m (4' 10.8\")   BMI 30.38 kg/m       EXAM:    General Appearance: Pleasant, alert, appropriate appearance for age. No acute distress  Head Exam: Normocephalic, without obvious abnormality.  Genitourinary Exam Female:   External genitalia: atrophic vulva  Urinary system: urethral meatus normal  Vagina: mucosa atrophic and pale, no excoriations or ulcerations noted.    The size 2.75 in  gellhorn pessary was removed, cleaned and reinserted without issue.    IMP: Pelvic prolapse - doing well with pessary.    Rectal incontinence, advised getting a consult with colorectal surgeons.    Plan: Follow up in  6 months.  Discussed that her pessary fits well when in place, but is difficult to remove.  Consider going down to a 2.5 gellhorn next visit for ease of rechecks.    Becky Healy MD on 11/20/2024 at 11:16 AM       "

## 2024-11-20 NOTE — NURSING NOTE
"Initial /64 (BP Location: Left arm, Patient Position: Chair, Cuff Size: Adult Regular)   Pulse 66   Temp 97.8  F (36.6  C) (Tympanic)   Resp 16   Ht 1.494 m (4' 10.8\")   BMI 30.38 kg/m   Estimated body mass index is 30.38 kg/m  as calculated from the following:    Height as of this encounter: 1.494 m (4' 10.8\").    Weight as of 9/18/24: 67.8 kg (149 lb 6.4 oz). .    Thea Garcia, DARIUS    "

## 2024-11-25 ENCOUNTER — OFFICE VISIT (OUTPATIENT)
Dept: FAMILY MEDICINE | Facility: CLINIC | Age: 79
End: 2024-11-25
Payer: COMMERCIAL

## 2024-11-25 VITALS
OXYGEN SATURATION: 98 % | HEART RATE: 78 BPM | TEMPERATURE: 97.1 F | RESPIRATION RATE: 18 BRPM | SYSTOLIC BLOOD PRESSURE: 123 MMHG | DIASTOLIC BLOOD PRESSURE: 66 MMHG

## 2024-11-25 DIAGNOSIS — E78.5 HYPERLIPIDEMIA LDL GOAL <130: Primary | ICD-10-CM

## 2024-11-25 DIAGNOSIS — L60.0 INGROWING TOENAIL WITH INFECTION: ICD-10-CM

## 2024-11-25 PROCEDURE — 99214 OFFICE O/P EST MOD 30 MIN: CPT | Performed by: FAMILY MEDICINE

## 2024-11-25 RX ORDER — CEPHALEXIN 500 MG/1
500 CAPSULE ORAL 3 TIMES DAILY
Qty: 21 CAPSULE | Refills: 0 | Status: SHIPPED | OUTPATIENT
Start: 2024-11-25

## 2024-11-25 RX ORDER — FLUTICASONE PROPIONATE 50 MCG
SPRAY, SUSPENSION (ML) NASAL
COMMUNITY
Start: 2024-09-25

## 2024-11-25 ASSESSMENT — PATIENT HEALTH QUESTIONNAIRE - PHQ9
SUM OF ALL RESPONSES TO PHQ QUESTIONS 1-9: 0
SUM OF ALL RESPONSES TO PHQ QUESTIONS 1-9: 0
10. IF YOU CHECKED OFF ANY PROBLEMS, HOW DIFFICULT HAVE THESE PROBLEMS MADE IT FOR YOU TO DO YOUR WORK, TAKE CARE OF THINGS AT HOME, OR GET ALONG WITH OTHER PEOPLE: NOT DIFFICULT AT ALL

## 2024-11-25 ASSESSMENT — PAIN SCALES - GENERAL: PAINLEVEL_OUTOF10: SEVERE PAIN (7)

## 2024-11-25 NOTE — PROGRESS NOTES
Assessment & Plan     Hyperlipidemia LDL goal <130  Has been stable     Ingrowing toenail with infection  Worsening with pain and swelling, on right great toe, will have her to try abx   Will also have her to follow up with podiatry for further evaluation and permanent managing on the ingrowing toenail   - cephALEXin (KEFLEX) 500 MG capsule; Take 1 capsule (500 mg) by mouth 3 times daily.  - Orthopedic  Referral; Future      FUTURE APPOINTMENTS:       - Follow-up visit in 1 month for Preop     Subjective   Camelia is a 79 year old, presenting for the following health issues:  Toenail (Had two ingrown toe nails removed in April. Now part of left toenail has turned black, and is painful to walk. Has tried bacitracin, soaking feet in Epsom salt, mild relief. )        11/25/2024     2:28 PM   Additional Questions   Roomed by Fern CERON     History of Present Illness       Reason for visit:  Concern about possible infection in toe  Symptom onset:  1-2 weeks ago   She is taking medications regularly.         Toenail Problem  Onset/Duration: One week ago turned black  Description  Location: Feet toenails   Color: Red toes and black nail  Border description: inflamed  Character: painful, blanches to palpitation  Itching: no  Bleeding:  No  Intensity:  moderate  Progression of Symptoms:  worsening  Accompanying signs and symptoms:   Bleeding: No  Scaling: YES  Excessive sun exposure/tanning: No  Sunscreen used: No  History:           Any previous history of skin problems: No  Any family history of melanoma: No  Previous episodes of similar issues: YES- Had ingrown toenails removed in April  Precipitating or alleviating factors: Walking  Therapies tried and outcome: bacitracin and soaking feet in Epsom salt, mild relief.         Review of Systems  Constitutional, HEENT, cardiovascular, pulmonary, GI, , musculoskeletal, neuro, skin, endocrine and psych systems are negative, except as otherwise noted.      Objective     /66   Pulse 78   Temp 97.1  F (36.2  C) (Temporal)   Resp 18   SpO2 98%   There is no height or weight on file to calculate BMI.  Physical Exam   GENERAL: alert and no distress  NECK: no adenopathy, no asymmetry, masses, or scars  RESP: lungs clear to auscultation - no rales, rhonchi or wheezes  CV: regular rate and rhythm, normal S1 S2, no S3 or S4, no murmur, click or rub, no peripheral edema  ABDOMEN: soft, nontender, no hepatosplenomegaly, no masses and bowel sounds normal  MS: no gross musculoskeletal defects noted, no edema            Signed Electronically by: Kody Downing MD

## 2024-12-17 ENCOUNTER — OFFICE VISIT (OUTPATIENT)
Dept: FAMILY MEDICINE | Facility: CLINIC | Age: 79
End: 2024-12-17
Payer: COMMERCIAL

## 2024-12-17 VITALS
HEART RATE: 68 BPM | RESPIRATION RATE: 12 BRPM | OXYGEN SATURATION: 98 % | DIASTOLIC BLOOD PRESSURE: 68 MMHG | SYSTOLIC BLOOD PRESSURE: 122 MMHG | BODY MASS INDEX: 30.5 KG/M2 | TEMPERATURE: 97.3 F | WEIGHT: 150 LBS

## 2024-12-17 DIAGNOSIS — H02.403 PTOSIS OF BOTH EYELIDS: ICD-10-CM

## 2024-12-17 DIAGNOSIS — Z01.818 PREOP GENERAL PHYSICAL EXAM: Primary | ICD-10-CM

## 2024-12-17 LAB — HGB BLD-MCNC: 12.3 G/DL (ref 11.7–15.7)

## 2024-12-17 PROCEDURE — 36415 COLL VENOUS BLD VENIPUNCTURE: CPT | Performed by: FAMILY MEDICINE

## 2024-12-17 PROCEDURE — 85018 HEMOGLOBIN: CPT | Performed by: FAMILY MEDICINE

## 2024-12-17 PROCEDURE — 99214 OFFICE O/P EST MOD 30 MIN: CPT | Performed by: FAMILY MEDICINE

## 2024-12-17 ASSESSMENT — PATIENT HEALTH QUESTIONNAIRE - PHQ9
10. IF YOU CHECKED OFF ANY PROBLEMS, HOW DIFFICULT HAVE THESE PROBLEMS MADE IT FOR YOU TO DO YOUR WORK, TAKE CARE OF THINGS AT HOME, OR GET ALONG WITH OTHER PEOPLE: NOT DIFFICULT AT ALL
SUM OF ALL RESPONSES TO PHQ QUESTIONS 1-9: 0
SUM OF ALL RESPONSES TO PHQ QUESTIONS 1-9: 0

## 2024-12-17 ASSESSMENT — PAIN SCALES - GENERAL: PAINLEVEL_OUTOF10: NO PAIN (0)

## 2024-12-17 NOTE — PROGRESS NOTES
Preoperative Evaluation  [address] [address]  Phone: [phone]  Primary Provider: Kody Downing MD  Pre-op Performing Provider: Kody Downing MD  Dec 17, 2024             12/17/2024   Surgical Information   What procedure is being done? Eyelid lift surgery    Facility or Hospital where procedure/surgery will be performed: [facility]    Who is doing the procedure / surgery? Dr Tori Shannon    Date of surgery / procedure: 1/15/2025    Time of surgery / procedure: TBD    Where do you plan to recover after surgery? at home with family        Patient-reported     Fax number for surgical facility: [phone]    Assessment & Plan     The proposed surgical procedure is considered LOW risk.    Preop general physical exam    - Hemoglobin; Future    Ptosis of both eyelids    - Hemoglobin; Future            - No identified additional risk factors other than previously addressed         Recommendation  Approval given to proceed with proposed procedure, without further diagnostic evaluation.    Loni Denise is a 79 year old, presenting for the following:  Pre-Op Exam          12/17/2024     1:56 PM   Additional Questions   Roomed by Obey             12/17/2024   Pre-Op Questionnaire   Have you ever had a heart attack or stroke? No    Have you ever had surgery on your heart or blood vessels, such as a stent placement, a coronary artery bypass, or surgery on an artery in your head, neck, heart, or legs? No    Do you have chest pain with activity? No    Do you have a history of heart failure? No    Do you currently have a cold, bronchitis or symptoms of other infection? No    Do you have a cough, shortness of breath, or wheezing? No    Do you or anyone in your family have previous history of blood clots? No    Do you or does anyone in your family have a serious bleeding problem such as prolonged bleeding following  surgeries or cuts? No    Have you ever had problems with anemia or been told to take iron pills? No    Have you had any abnormal blood loss such as black, tarry or bloody stools, or abnormal vaginal bleeding? No    Have you ever had a blood transfusion? No    Are you willing to have a blood transfusion if it is medically needed before, during, or after your surgery? Yes    Have you or any of your relatives ever had problems with anesthesia? No    Do you have sleep apnea, excessive snoring or daytime drowsiness? No    Do you have any artifical heart valves or other implanted medical devices like a pacemaker, defibrillator, or continuous glucose monitor? No    Do you have artificial joints? (!) YES    Are you allergic to latex? No        Patient-reported     Health Care Directive  Patient has a Health Care Directive on file      Preoperative Review of    reviewed - no record of controlled substances prescribed.      Status of Chronic Conditions:  See problem list for active medical problems.  Problems all longstanding and stable, except as noted/documented.  See ROS for pertinent symptoms related to these conditions.    Patient Active Problem List    Diagnosis Date Noted    Midline cystocele 05/12/2023     Priority: Medium    Major depressive disorder in remission, unspecified whether recurrent (H) 02/09/2022     Priority: Medium    Eczema of both hands 09/08/2016     Priority: Medium    Chronic rhinitis 06/30/2011     Priority: Medium    HYPERLIPIDEMIA LDL GOAL <130 10/31/2010     Priority: Medium    Postmenopausal atrophic vaginitis 10/10/2007     Priority: Medium    Candidiasis of vulva and vagina 10/10/2007     Priority: Medium    Dysplasia of cervix 05/02/2005     Priority: Medium     S/p TVH; needs yearly pap testing  [id number]  Problem list name updated by automated process. Provider to review      Symptomatic menopausal or female climacteric states 05/02/2005     Priority: Medium      Past Medical  History:   Diagnosis Date    Arthritis     Contact dermatitis and other eczema, due to unspecified cause     Dysplasia of cervix, unspecified 2005     Past Surgical History:   Procedure Laterality Date    ARTHROPLASTY KNEE Right 2/23/2023    Procedure: TOTAL Knee Arthroplasty, Right;  Surgeon: Charlie Escamilla MD;  Location: [facility] OR    HC CORRECT BUNION,SIMPLE  05/23/1990    Right foot    HC REMOVAL OF TONSILS,<15 Y/O      HYSTERECTOMY, PAP NO LONGER INDICATED  04/2005    HYSTERECTOMY, VAGINAL  04/2005    PHACOEMULSIFICATION WITH STANDARD INTRAOCULAR LENS IMPLANT Right 06/21/2021    Procedure: Right eye Cataract Extraction with Implant;  Surgeon: Alexander Mendoza MD;  Location: [facility]    PHACOEMULSIFICATION WITH STANDARD INTRAOCULAR LENS IMPLANT Left 07/21/2021    Procedure: Cataract Extraction with Implant, left eye;  Surgeon: Alexander Mendoza MD;  Location: [facility]    SURGICAL HISTORY OF -   06/19/1995    Bunion deformity & fractured tibial sesamoid left foot    ZZC APPENDECTOMY       Current Outpatient Medications   Medication Sig Dispense Refill    cephALEXin (KEFLEX) 500 MG capsule Take 1 capsule (500 mg) by mouth 3 times daily. 21 capsule 0    fluticasone (FLONASE) 50 MCG/ACT nasal spray INSTILL 1 SPRAY INTO EACH NOSTRIL ONCE DAILY UNTIL INSTRUCTED OTHERWISE      metroNIDAZOLE (METROCREAM) 0.75 % external cream Apply  topically to the face BID for 10-14 days 45 g 0    metroNIDAZOLE (METROCREAM) 0.75 % external cream Apply topically 2 times daily. 45 g 3    Probiotic Product (PROBIOTIC PO)       simvastatin (ZOCOR) 40 MG tablet Take 1 tablet (40 mg) by mouth at bedtime. 90 tablet 0    traZODone (DESYREL) 50 MG tablet Take 0.5 tablets (25 mg) by mouth at bedtime 90 tablet 0    triamcinolone (KENALOG) 0.1 % external cream Apply  topically 2 times daily. 30 g 11    triamcinolone (KENALOG) 0.1 % external cream Apply topically 2 times daily. 80 g 3       Allergies   Allergen Reactions    Seasonal  "Allergies         Social History     Tobacco Use    Smoking status: Never     Passive exposure: Never    Smokeless tobacco: Never   Substance Use Topics    Alcohol use: Yes     Comment: occasional wine     Family History   Problem Relation Age of Onset    Cerebrovascular Disease Mother     Arthritis Mother     Osteoporosis Mother     Melanoma No family hx of      History   Drug Use No             Review of Systems  Constitutional, HEENT, cardiovascular, pulmonary, GI, , musculoskeletal, neuro, skin, endocrine and psych systems are negative, except as otherwise noted.    Objective    /68   Pulse 68   Temp 97.3  F (36.3  C) (Tympanic)   Resp 12   Wt 68 kg (150 lb)   SpO2 98%   BMI 30.50 kg/m     Estimated body mass index is 30.5 kg/m  as calculated from the following:    Height as of 11/20/24: 1.494 m (4' 10.8\").    Weight as of this encounter: 68 kg (150 lb).  Physical Exam  GENERAL: alert and no distress  EYES: Eyes grossly normal to inspection, PERRL and conjunctivae and sclerae normal  HENT: ear canals and TM's normal, nose and mouth without ulcers or lesions  NECK: no adenopathy, no asymmetry, masses, or scars  RESP: lungs clear to auscultation - no rales, rhonchi or wheezes  CV: regular rate and rhythm, normal S1 S2, no S3 or S4, no murmur, click or rub, no peripheral edema  ABDOMEN: soft, nontender, no hepatosplenomegaly, no masses and bowel sounds normal  MS: no gross musculoskeletal defects noted, no edema  SKIN: no suspicious lesions or rashes  NEURO: Normal strength and tone, mentation intact and speech normal  BACK: no CVA tenderness, no paralumbar tenderness    Recent Labs   Lab Test 12/22/23  1032      POTASSIUM 4.5   CR 0.80        Diagnostics  Recent Results (from the past 48 hours)   Hemoglobin    Collection Time: 12/17/24  2:19 PM   Result Value Ref Range    Hemoglobin 12.3 11.7 - 15.7 g/dL      EKG: appears normal, NSR, normal axis, normal intervals, no acute ST/T changes c/w " ischemia, no LVH by voltage criteria, unchanged from previous tracings at 9-18-24    Revised Cardiac Risk Index (RCRI)  The patient has the following serious cardiovascular risks for perioperative complications:   - No serious cardiac risks = 0 points     RCRI Interpretation: 0 points: Class I (very low risk - 0.4% complication rate)         Signed Electronically by: Kody Downing MD  A copy of this evaluation report is provided to the requesting physician.

## 2025-01-27 DIAGNOSIS — E78.5 HYPERLIPIDEMIA LDL GOAL <130: ICD-10-CM

## 2025-01-27 RX ORDER — SIMVASTATIN 40 MG
40 TABLET ORAL AT BEDTIME
Qty: 90 TABLET | Refills: 3 | Status: SHIPPED | OUTPATIENT
Start: 2025-01-27

## 2025-02-07 ENCOUNTER — ANCILLARY PROCEDURE (OUTPATIENT)
Dept: GENERAL RADIOLOGY | Facility: CLINIC | Age: 80
End: 2025-02-07
Attending: PHYSICIAN ASSISTANT
Payer: COMMERCIAL

## 2025-02-07 PROCEDURE — 71046 X-RAY EXAM CHEST 2 VIEWS: CPT | Mod: TC | Performed by: RADIOLOGY

## 2025-02-25 ENCOUNTER — OFFICE VISIT (OUTPATIENT)
Dept: ORTHOPEDICS | Facility: CLINIC | Age: 80
End: 2025-02-25
Payer: COMMERCIAL

## 2025-02-25 DIAGNOSIS — M65.332 TRIGGER MIDDLE FINGER OF LEFT HAND: ICD-10-CM

## 2025-02-25 DIAGNOSIS — M65.331 TRIGGER MIDDLE FINGER OF RIGHT HAND: Primary | ICD-10-CM

## 2025-02-25 DIAGNOSIS — M72.2 PLANTAR FASCIAL FIBROMATOSIS: ICD-10-CM

## 2025-02-25 PROCEDURE — 99213 OFFICE O/P EST LOW 20 MIN: CPT | Mod: 25 | Performed by: FAMILY MEDICINE

## 2025-02-25 PROCEDURE — 20550 NJX 1 TENDON SHEATH/LIGAMENT: CPT | Mod: F7 | Performed by: FAMILY MEDICINE

## 2025-02-25 RX ORDER — LIDOCAINE HYDROCHLORIDE 10 MG/ML
0.5 INJECTION, SOLUTION INFILTRATION; PERINEURAL
Status: COMPLETED | OUTPATIENT
Start: 2025-02-25 | End: 2025-02-25

## 2025-02-25 RX ORDER — TRIAMCINOLONE ACETONIDE 40 MG/ML
20 INJECTION, SUSPENSION INTRA-ARTICULAR; INTRAMUSCULAR
Status: COMPLETED | OUTPATIENT
Start: 2025-02-25 | End: 2025-02-25

## 2025-02-25 RX ADMIN — TRIAMCINOLONE ACETONIDE 20 MG: 40 INJECTION, SUSPENSION INTRA-ARTICULAR; INTRAMUSCULAR at 14:01

## 2025-02-25 RX ADMIN — LIDOCAINE HYDROCHLORIDE 0.5 ML: 10 INJECTION, SOLUTION INFILTRATION; PERINEURAL at 14:01

## 2025-02-25 NOTE — LETTER
"2025      Camelia Bangura  [address]  [place] [address]      Dear Colleague,    Thank you for referring your patient, Camelia Bangura, to the [facility] [place]. Please see a copy of my visit note below.    Camelia Bangura  :  1945  DOS: 25  MRN: [id number]    Sports Medicine Clinic Visit    PCP: Tracey Pitts    Camelia Bangura is a 78 year old Left hand dominant female who is seen as a self referral presenting with left long finger pain.    Injury: Gradual onset of pain over the past 1+ years that is significantly worse over the past 2+ months.  Pain located over left long finger, nonradiating.  Additional Features:  Positive: weakness and contracture.  Symptoms are better with Rest and keeping finger straight.  Symptoms are worse with: finger flexion, gripping/grasping.  Other evaluation and/or treatments so far consists of: No Treatment tried to date.  Recent imaging completed: No recent imaging completed.  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  Left long finger A1 pulley injection completed on 23 provided relief for 10+ months.  Patient notes feeling of joint \"moving out of place\" with twisting/grasping movements.  No new injury in the interim.    Second complaint of right medial midfoot pain over the past 1+ months that initially started with her daily walks.  Pain is worse with prolonged walking/standing & being barefoot.  Currently treating with OTC pain medications as needed with some relief.  No prior imaging completed.  No previous history of right foot issues.    Interim History - " 2025  Since last visit on 11/12/2024 patient has worsening right long finger pain with joint stiffness over the past 3+ months.  Patient would like to discuss trigger finger injection as discussed at last visit.  Left long finger is doing well following A1 pulley steroid injection.  No interim injury.       Review of Systems  Musculoskeletal: as above  Remainder of review of systems is negative including constitutional, CV, pulmonary, GI, Skin and Neurologic except as noted in HPI or medical history.    Past Medical History:   Diagnosis Date     Arthritis      Contact dermatitis and other eczema, due to unspecified cause      Dysplasia of cervix, unspecified 2005     Past Surgical History:   Procedure Laterality Date     ARTHROPLASTY KNEE Right 2/23/2023    Procedure: TOTAL Knee Arthroplasty, Right;  Surgeon: Charlie Escamilla MD;  Location: [facility]     HC CORRECT BUNION,SIMPLE  05/23/1990    Right foot     HC REMOVAL OF TONSILS,<13 Y/O       HYSTERECTOMY, PAP NO LONGER INDICATED  04/2005     HYSTERECTOMY, VAGINAL  04/2005     PHACOEMULSIFICATION WITH STANDARD INTRAOCULAR LENS IMPLANT Right 06/21/2021    Procedure: Right eye Cataract Extraction with Implant;  Surgeon: Alexander Mendoza MD;  Location: [facility]     PHACOEMULSIFICATION WITH STANDARD INTRAOCULAR LENS IMPLANT Left 07/21/2021    Procedure: Cataract Extraction with Implant, left eye;  Surgeon: Alexander Mendoza MD;  Location: [facility]     SURGICAL HISTORY OF -   06/19/1995    Bunion deformity & fractured tibial sesamoid left foot     ZZC APPENDECTOMY       Family History   Problem Relation Age of Onset     Cerebrovascular Disease Mother      Arthritis Mother      Osteoporosis Mother      Melanoma No family hx of        Objective  There were no vitals taken for this visit.    General: healthy, alert and in no distress    HEENT: no scleral icterus or conjunctival erythema   Skin: no suspicious lesions or rash. No jaundice.   CV: regular rhythm by  palpation, 2+ distal pulses, no pedal edema    Resp: normal respiratory effort without conversational dyspnea   Psych: normal mood and affect    Gait: nonantalgic, appropriate coordination and balance   Neuro: normal light touch sensory exam of the extremities. Motor strength as noted below     Bilateral Wrist and Hand exam    Inspection:       No swelling, bruising or deformity left    Tender:       nodular swelling over the A-1 pulley of the 3rd digit right    Non Tender:       Remainder of the Wrist and Hand left and right    ROM:       Decreased active and passive ROM of the 3rd MCP, PIP, and DIP with flexion right    Strength:       5/5 strength in the muscles of the hand, wrist and forearm left and right    Neurovascular:       2+ radial pulses bilaterally with brisk capillary refill and      normal sensation to light touch in the radial, median and ulnar nerve distributions    Radiology:  Recent Results (from the past 744 hours)   XR Foot RT G/E 3 vw    Narrative    XR FOOT RIGHT G/E 3 VIEWS   11/12/2024 10:33 AM     HISTORY: Pain in joint involving right ankle and foot  COMPARISON: None.       Impression    IMPRESSION: Postoperative changes of first metatarsal and first  proximal phalangeal osteotomies with screw fixation. No hardware  failure. 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  Mild degenerative arthritis right mid foot. Plantar and Achilles  calcaneal spurs.    JOSÉ GRAHAM MD         SYSTEM ID:  [id number]       Hand / Upper Extremity Injection/Arthrocentesis: R long A1    Date/Time: 2/25/2025 2:01 PM    Performed by: Jorge Arias DO  Authorized by: Jorge Arias DO    Indications:  Pain and therapeutic  Needle Size:  25 G  Guidance: landmark    Approach:  Volar  Condition: trigger finger    Location:  Long finger    Site:  R long A1  Medications:  0.5 mL lidocaine 1 %; 20 mg triamcinolone 40 MG/ML  Outcome:  Tolerated well, no immediate  complications  Procedure discussed: discussed risks, benefits, and alternatives    Consent Given by:  Patient  Timeout: timeout called immediately prior to procedure    Prep: patient was prepped and draped in usual sterile fashion        Assessment:  1. Trigger middle finger of right hand    2. Trigger middle finger of left hand          Plan:  Discussed the assessment with the patient.  Follow up: prn based on short term progress  Reviewed risk of recurrence, goals of conservative care  Oval-8 splint provided for right hand today  CSI today for control of pain and inflammation of the left 3rd A1 pulley was very effective  Reviewed cannot inject around this tendon more than twice in a lifetime  Hand surgery referral would be available if needed based on short and long term progress  She has never had CSI to the right 3rd digit flexor tendon/pulley, performed today after review of the relative risks, goals and limitations of CSI  Also with improved foot pain, signs of plantar fibromas, also early and mild, footwear options and strategies reviewed  Carbon fiber foot plate option for the right shoe reviewed  XR images independently visualized and reviewed with patient today in clinic  Expectations and goals of CSI reviewed  Often 2-3 days for steroid effect, and can take up to two weeks for maximum effect  We discussed modified progressive pain-free activity as tolerated  Do not overuse in first two weeks if feeling better due to concern for vulnerability while steroid is working  Supportive care reviewed  All questions were answered today  Contact us with additional questions or concerns  Signs and sx of concern reviewed      Jorge Arias DO, PARAMJIT  Sports Medicine Physician  [facility]          Disclaimer: This note consists of symbols derived from keyboarding, dictation and/or voice recognition software. As a result, there may be errors in the script that have gone undetected.  Please consider this when interpreting information found in this chart.      Again, thank you for allowing me to participate in the care of your patient.        Sincerely,        Jorge Arias, DO    Electronically signed

## 2025-02-25 NOTE — PROGRESS NOTES
"Camelia Bangura  :  1945  DOS: 25  MRN: [id number]    Sports Medicine Clinic Visit    PCP: Tracey Pitts    Camelia Bangura is a 78 year old Left hand dominant female who is seen as a self referral presenting with left long finger pain.    Injury: Gradual onset of pain over the past 1+ years that is significantly worse over the past 2+ months.  Pain located over left long finger, nonradiating.  Additional Features:  Positive: weakness and contracture.  Symptoms are better with Rest and keeping finger straight.  Symptoms are worse with: finger flexion, gripping/grasping.  Other evaluation and/or treatments so far consists of: No Treatment tried to date.  Recent imaging completed: No recent imaging completed.  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  Left long finger A1 pulley injection completed on 23 provided relief for 10+ months.  Patient notes feeling of joint \"moving out of place\" with twisting/grasping movements.  No new injury in the interim.    Second complaint of right medial midfoot pain over the past 1+ months that initially started with her daily walks.  Pain is worse with prolonged walking/standing & being barefoot.  Currently treating with OTC pain medications as needed with some relief.  No prior imaging completed.  No previous history of right foot issues.    Interim History - 2025  Since last visit on 2024 patient has worsening right long finger pain with joint stiffness over the past 3+ months.  Patient would like to discuss trigger finger injection as discussed at last visit.  Left long finger is doing well following A1 pulley " steroid injection.  No interim injury.       Review of Systems  Musculoskeletal: as above  Remainder of review of systems is negative including constitutional, CV, pulmonary, GI, Skin and Neurologic except as noted in HPI or medical history.    Past Medical History:   Diagnosis Date    Arthritis     Contact dermatitis and other eczema, due to unspecified cause     Dysplasia of cervix, unspecified 2005     Past Surgical History:   Procedure Laterality Date    ARTHROPLASTY KNEE Right 2/23/2023    Procedure: TOTAL Knee Arthroplasty, Right;  Surgeon: Charlie Escamilla MD;  Location: [facility]    HC CORRECT BUNION,SIMPLE  05/23/1990    Right foot    HC REMOVAL OF TONSILS,<11 Y/O      HYSTERECTOMY, PAP NO LONGER INDICATED  04/2005    HYSTERECTOMY, VAGINAL  04/2005    PHACOEMULSIFICATION WITH STANDARD INTRAOCULAR LENS IMPLANT Right 06/21/2021    Procedure: Right eye Cataract Extraction with Implant;  Surgeon: Alexander Mendoza MD;  Location: [facility]    PHACOEMULSIFICATION WITH STANDARD INTRAOCULAR LENS IMPLANT Left 07/21/2021    Procedure: Cataract Extraction with Implant, left eye;  Surgeon: Alexander Mendoza MD;  Location: [facility]    SURGICAL HISTORY OF -   06/19/1995    Bunion deformity & fractured tibial sesamoid left foot    ZZC APPENDECTOMY       Family History   Problem Relation Age of Onset    Cerebrovascular Disease Mother     Arthritis Mother     Osteoporosis Mother     Melanoma No family hx of        Objective  There were no vitals taken for this visit.    General: healthy, alert and in no distress    HEENT: no scleral icterus or conjunctival erythema   Skin: no suspicious lesions or rash. No jaundice.   CV: regular rhythm by palpation, 2+ distal pulses, no pedal edema    Resp: normal respiratory effort without conversational dyspnea   Psych: normal mood and affect    Gait: nonantalgic, appropriate coordination and balance   Neuro: normal light touch sensory exam of the extremities. Motor strength as  noted below     Bilateral Wrist and Hand exam    Inspection:       No swelling, bruising or deformity left    Tender:       nodular swelling over the A-1 pulley of the 3rd digit right    Non Tender:       Remainder of the Wrist and Hand left and right    ROM:       Decreased active and passive ROM of the 3rd MCP, PIP, and DIP with flexion right    Strength:       5/5 strength in the muscles of the hand, wrist and forearm left and right    Neurovascular:       2+ radial pulses bilaterally with brisk capillary refill and      normal sensation to light touch in the radial, median and ulnar nerve distributions    Radiology:  Recent Results (from the past 744 hours)   XR Foot RT G/E 3 vw    Narrative    XR FOOT RIGHT G/E 3 VIEWS   11/12/2024 10:33 AM     HISTORY: Pain in joint involving right ankle and foot  COMPARISON: None.       Impression    IMPRESSION: Postoperative changes of first metatarsal and first  proximal phalangeal osteotomies with screw fixation. No hardware  failure. 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  Mild degenerative arthritis right mid foot. Plantar and Achilles  calcaneal spurs.    JOSÉ GRAHAM MD         SYSTEM ID:  [id number]       Hand / Upper Extremity Injection/Arthrocentesis: R long A1    Date/Time: 2/25/2025 2:01 PM    Performed by: Jorge Arias DO  Authorized by: Jorge Arias DO    Indications:  Pain and therapeutic  Needle Size:  25 G  Guidance: landmark    Approach:  Volar  Condition: trigger finger    Location:  Long finger    Site:  R long A1  Medications:  0.5 mL lidocaine 1 %; 20 mg triamcinolone 40 MG/ML  Outcome:  Tolerated well, no immediate complications  Procedure discussed: discussed risks, benefits, and alternatives    Consent Given by:  Patient  Timeout: timeout called immediately prior to procedure    Prep: patient was prepped and draped in usual sterile fashion        Assessment:  1. Trigger middle finger of right hand    2.  Trigger middle finger of left hand          Plan:  Discussed the assessment with the patient.  Follow up: prn based on short term progress  Reviewed risk of recurrence, goals of conservative care  Oval-8 splint provided for right hand today  CSI today for control of pain and inflammation of the left 3rd A1 pulley was very effective  Reviewed cannot inject around this tendon more than twice in a lifetime  Hand surgery referral would be available if needed based on short and long term progress  She has never had CSI to the right 3rd digit flexor tendon/pulley, performed today after review of the relative risks, goals and limitations of CSI  Also with improved foot pain, signs of plantar fibromas, also early and mild, footwear options and strategies reviewed  Carbon fiber foot plate option for the right shoe reviewed  XR images independently visualized and reviewed with patient today in clinic  Expectations and goals of CSI reviewed  Often 2-3 days for steroid effect, and can take up to two weeks for maximum effect  We discussed modified progressive pain-free activity as tolerated  Do not overuse in first two weeks if feeling better due to concern for vulnerability while steroid is working  Supportive care reviewed  All questions were answered today  Contact us with additional questions or concerns  Signs and sx of concern reviewed      Jorge Arias DO, PARAMJIT  Sports Medicine Physician  [facility]          Disclaimer: This note consists of symbols derived from keyboarding, dictation and/or voice recognition software. As a result, there may be errors in the script that have gone undetected. Please consider this when interpreting information found in this chart.

## 2025-03-03 ENCOUNTER — HOSPITAL ENCOUNTER (OUTPATIENT)
Dept: MAMMOGRAPHY | Facility: CLINIC | Age: 80
Discharge: HOME | End: 2025-03-03
Attending: FAMILY MEDICINE | Admitting: FAMILY MEDICINE
Payer: COMMERCIAL

## 2025-03-03 DIAGNOSIS — Z12.31 VISIT FOR SCREENING MAMMOGRAM: ICD-10-CM

## 2025-03-03 PROCEDURE — 77063 BREAST TOMOSYNTHESIS BI: CPT

## 2025-03-03 PROCEDURE — 77067 SCR MAMMO BI INCL CAD: CPT

## 2025-03-27 ENCOUNTER — NURSE TRIAGE (OUTPATIENT)
Dept: FAMILY MEDICINE | Facility: CLINIC | Age: 80
End: 2025-03-27
Payer: COMMERCIAL

## 2025-03-27 ENCOUNTER — ANCILLARY PROCEDURE (OUTPATIENT)
Dept: GENERAL RADIOLOGY | Facility: CLINIC | Age: 80
End: 2025-03-27
Attending: PHYSICIAN ASSISTANT
Payer: COMMERCIAL

## 2025-03-27 ENCOUNTER — OFFICE VISIT (OUTPATIENT)
Dept: URGENT CARE | Facility: URGENT CARE | Age: 80
End: 2025-03-27
Payer: COMMERCIAL

## 2025-03-27 VITALS
RESPIRATION RATE: 16 BRPM | DIASTOLIC BLOOD PRESSURE: 73 MMHG | SYSTOLIC BLOOD PRESSURE: 129 MMHG | TEMPERATURE: 98 F | BODY MASS INDEX: 30.93 KG/M2 | HEART RATE: 99 BPM | WEIGHT: 148 LBS | OXYGEN SATURATION: 98 %

## 2025-03-27 DIAGNOSIS — S20.211A CONTUSION OF RIB ON RIGHT SIDE, INITIAL ENCOUNTER: Primary | ICD-10-CM

## 2025-03-27 DIAGNOSIS — R07.81 RIB PAIN ON RIGHT SIDE: ICD-10-CM

## 2025-03-27 RX ORDER — NAPROXEN 500 MG/1
500 TABLET ORAL 2 TIMES DAILY WITH MEALS
Qty: 14 TABLET | Refills: 0 | Status: SHIPPED | OUTPATIENT
Start: 2025-03-27 | End: 2025-04-03

## 2025-03-27 NOTE — PATIENT INSTRUCTIONS
Naproxen and acetaminophen together for pain. Take one tablet (500 mg) of Naproxen and 2 tablets (1000 mg) of acetaminophen at the same time for pain. DO this twice a day    Seek prompt  medical care when experiencing difficulty breathing, new or severe pain, worsening cough, generalized weakness, pallor, or fever.     See PCP for follow up for further evaluation in one month
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
## 2025-03-27 NOTE — PROGRESS NOTES
Assessment & Plan        1. Contusion of rib on right side, initial encounter (Primary)    -Also recommended acetaminophen 1000 mg to be taken at the same time as naproxen to help with pain  -Patient advised to do deep breathing exercises to prevent atelectasis and pneumonia  - naproxen (NAPROSYN) 500 MG tablet; Take 1 tablet (500 mg) by mouth 2 times daily (with meals) for 7 days.  Dispense: 14 tablet; Refill: 0    2. Rib pain on right side    - XR Ribs & Chest Right G/E 3 Views does not show signs of displaced fracture per Radiology report  - naproxen (NAPROSYN) 500 MG tablet; Take 1 tablet (500 mg) by mouth 2 times daily (with meals) for 7 days.  Dispense: 14 tablet; Refill: 0      Patient Instructions     Naproxen and acetaminophen together for pain. Take one tablet (500 mg) of Naproxen and 2 tablets (1000 mg) of acetaminophen at the same time for pain. DO this twice a day    Seek prompt  medical care when experiencing difficulty breathing, new or severe pain, worsening cough, generalized weakness, pallor, or fever.     See PCP for follow up for further evaluation in one month      Return if symptoms worsen or fail to improve, for Follow up.    At the end of the encounter, I discussed results, diagnosis, medications. Discussed red flags for immediate return to clinic/ER, as well as indications for follow up if no improvement. Patient understood and agreed to plan. Patient was stable for discharge.    Loni Denise is a 80 year old female who presents to clinic today  for the following health issues:  Chief Complaint   Patient presents with    Rib Injury     Right side rib injury. Pt slide off bed and hit heavy wooden foot stool. Having sore and hurt when move certain ways.      HPI    Patient reports right-sided rib pain which happened 2 days ago.  Patient fell off the bed and landed on a heavy wooden stool on the right side of the chest.  She now reports right-sided sharp pain which worsens with  movement.  She has been taking Advil 3 times a day and acetaminophen which help.  She denies shortness of breath, chest tightness.    Review of Systems   Cardiovascular:  Positive for chest pain.       Problem List:  2023-05: Midline cystocele  2022-02: Major depressive disorder in remission, unspecified whether   recurrent  2016-09: Eczema of both hands  2012-08: Advanced directives, counseling/discussion  2012-06: Health Care Home  2011-06: Chronic rhinitis  2010-10: HYPERLIPIDEMIA LDL GOAL <130  2009-04: Hyperlipidemia  2007-10: Postmenopausal atrophic vaginitis  2007-10: Candidiasis of vulva and vagina  2005-05: Dysplasia of cervix  2005-05: Symptomatic menopausal or female climacteric states      Past Medical History:   Diagnosis Date    Arthritis     Contact dermatitis and other eczema, due to unspecified cause     Dysplasia of cervix, unspecified 2005       Social History     Tobacco Use    Smoking status: Never     Passive exposure: Never    Smokeless tobacco: Never   Substance Use Topics    Alcohol use: Yes     Comment: occasional wine 1 per week           Objective    /73 (BP Location: Right arm, Patient Position: Chair, Cuff Size: Adult Regular)   Pulse 99   Temp 98  F (36.7  C) (Tympanic)   Resp 16   Wt 67.1 kg (148 lb)   SpO2 98%   BMI 30.93 kg/m    Physical Exam  Constitutional:       Appearance: Normal appearance.   HENT:      Head: Normocephalic.   Cardiovascular:      Rate and Rhythm: Normal rate and regular rhythm.   Pulmonary:      Effort: Pulmonary effort is normal.      Breath sounds: Normal breath sounds.   Chest:   Breasts:     Right: Tenderness present.          Comments: Right sided chest tenderness with palpation  Musculoskeletal:      Cervical back: Normal range of motion and neck supple.   Lymphadenopathy:      Head:      Right side of head: No submental, submandibular or tonsillar adenopathy.      Left side of head: No submental, submandibular or tonsillar adenopathy.    Skin:     General: Skin is warm and dry.   Neurological:      Mental Status: She is alert and oriented to person, place, and time.              Yodit Watters PA-C

## 2025-04-24 DIAGNOSIS — G47.00 INSOMNIA, UNSPECIFIED TYPE: ICD-10-CM

## 2025-04-24 RX ORDER — TRAZODONE HYDROCHLORIDE 50 MG/1
25 TABLET ORAL AT BEDTIME
Qty: 45 TABLET | Refills: 0 | Status: SHIPPED | OUTPATIENT
Start: 2025-04-24

## 2025-04-24 NOTE — TELEPHONE ENCOUNTER
Last Written Prescription Date:  7/17/2024  Last Fill Quantity: 90,  # refills: 0   Last office visit: 11/20/2024  Dr. Garrido    Future Office Visit:  5/8/2025    Requested Prescriptions   Pending Prescriptions Disp Refills    traZODone (DESYREL) 50 MG tablet 90 tablet 0     Sig: Take 0.5 tablets (25 mg) by mouth at bedtime.       Serotonin Modulators Passed - 4/24/2025  8:45 AM        Passed - Medication is active on med list and the sig matches. RN to manually verify dose and sig if red X/fail.     If the protocol passes (green check), you do not need to verify med dose and sig.    A prescription matches if they are the same clinical intention.    For Example: once daily and every morning are the same.    The protocol can not identify upper and lower case letters as matching and will fail.     For Example: Take 1 tablet (50 mg) by mouth daily     TAKE 1 TABLET (50 MG) BY MOUTH DAILY    For all fails (red x), verify dose and sig.    If the refill does match what is on file, the RN can still proceed to approve the refill request.       If they do not match, route to the appropriate provider.             Passed - Recent (12 mo) or future (90 days) visit within the authorizing provider's specialty     The patient must have completed an in-person or virtual visit within the past 12 months or has a future visit scheduled within the next 90 days with the authorizing provider s specialty.  Urgent care and e-visits do not qualify as an office visit for this protocol.          Passed - Medication indicated for associated diagnosis     Medication is associated with one or more of the following diagnoses:     Depression   Insomnia          Passed - Patient is age 18 or older        Passed - No active pregnancy on record        Passed - No positive pregnancy test in past 12 months           Prescription approved per [facility] Refill Protocol.    Shiela DAVIDSON RN   [facility]

## 2025-05-08 ENCOUNTER — OFFICE VISIT (OUTPATIENT)
Dept: OBGYN | Facility: CLINIC | Age: 80
End: 2025-05-08
Attending: OBSTETRICS & GYNECOLOGY
Payer: COMMERCIAL

## 2025-05-08 VITALS
RESPIRATION RATE: 16 BRPM | BODY MASS INDEX: 30.93 KG/M2 | TEMPERATURE: 97.8 F | SYSTOLIC BLOOD PRESSURE: 121 MMHG | HEART RATE: 72 BPM | DIASTOLIC BLOOD PRESSURE: 64 MMHG | HEIGHT: 58 IN

## 2025-05-08 DIAGNOSIS — Z46.89 PESSARY MAINTENANCE: Primary | ICD-10-CM

## 2025-05-08 DIAGNOSIS — N95.2 VAGINAL ATROPHY: ICD-10-CM

## 2025-05-08 RX ORDER — ESTRADIOL 0.1 MG/G
CREAM VAGINAL
Qty: 42.5 G | Refills: 0 | Status: SHIPPED | OUTPATIENT
Start: 2025-05-08

## 2025-05-08 NOTE — NURSING NOTE
"Initial /64 (BP Location: Left arm, Patient Position: Chair, Cuff Size: Adult Regular)   Pulse 72   Temp 97.8  F (36.6  C) (Tympanic)   Resp 16   Ht 1.473 m (4' 10\")   BMI 30.93 kg/m   Estimated body mass index is 30.93 kg/m  as calculated from the following:    Height as of this encounter: 1.473 m (4' 10\").    Weight as of 3/27/25: 67.1 kg (148 lb). .    Thea Garcia, DARIUS    "

## 2025-05-08 NOTE — PROGRESS NOTES
"Pessary follow-up visit.    Camelia Bangura is not complaining of any problems with the pessary. She is here for it to be removed, cleaned and reinserted. She has pain with removal and replacement, but it gets better quickly and is concerned about have a smaller size fall out.    /64 (BP Location: Left arm, Patient Position: Chair, Cuff Size: Adult Regular)   Pulse 72   Temp 97.8  F (36.6  C) (Tympanic)   Resp 16   Ht 1.473 m (4' 10\")   BMI 30.93 kg/m       EXAM:    General Appearance: Pleasant, alert, appropriate appearance for age. No acute distress  Head Exam: Normocephalic, without obvious abnormality.  Genitourinary Exam Female:   External genitalia: atrophic vulva  Urinary system: urethral meatus normal  Vagina: mucosa atrophic and pale, no excoriations or ulcerations noted.    The size 2 1/2 in  gellhorn pessary was removed, cleaned and reinserted.  She had some skin cracking with removal and replacement due to atrophy    IMP:   Encounter Diagnoses   Name Primary?    Pessary maintenance Yes    Vaginal atrophy      Pelvic prolapse - doing well with pessary.  She has more pain with removal due to lack of stretch at the opening.  Discussed topical estrogen prior to checks and using after checks if open sore to help with healing.  Rx sent.    Plan: Follow up in 3-6 months.    Becky Healy MD on 5/8/2025 at 11:26 AM       "

## 2025-06-21 ENCOUNTER — HEALTH MAINTENANCE LETTER (OUTPATIENT)
Age: 80
End: 2025-06-21

## 2025-07-07 SDOH — HEALTH STABILITY: PHYSICAL HEALTH: ON AVERAGE, HOW MANY MINUTES DO YOU ENGAGE IN EXERCISE AT THIS LEVEL?: 30 MIN

## 2025-07-07 SDOH — HEALTH STABILITY: PHYSICAL HEALTH: ON AVERAGE, HOW MANY DAYS PER WEEK DO YOU ENGAGE IN MODERATE TO STRENUOUS EXERCISE (LIKE A BRISK WALK)?: 6 DAYS

## 2025-07-31 ENCOUNTER — OFFICE VISIT (OUTPATIENT)
Dept: ORTHOPEDICS | Facility: CLINIC | Age: 80
End: 2025-07-31
Payer: COMMERCIAL

## 2025-07-31 DIAGNOSIS — M79.675 PAIN IN LEFT TOE(S): Primary | ICD-10-CM

## 2025-07-31 DIAGNOSIS — M65.332 TRIGGER MIDDLE FINGER OF LEFT HAND: ICD-10-CM

## 2025-07-31 DIAGNOSIS — M20.42 HAMMER TOE OF LEFT FOOT: ICD-10-CM

## 2025-07-31 DIAGNOSIS — M65.331 TRIGGER MIDDLE FINGER OF RIGHT HAND: ICD-10-CM

## 2025-07-31 NOTE — LETTER
2025      Camelia Bangura  [address]  [place] [address]      Dear Colleague,    Thank you for referring your patient, Camelia Bangura, to the [facility] [place]. Please see a copy of my visit note below.    Camelia Bangura  :  1945  DOS: 2025  MRN: [id number]    ASSESSMENT & PLAN    Camelia was seen today for pain.    Diagnoses and all orders for this visit:    Pain in left toe(s)  -     XR Foot LT G/E 3 vw; Future    Trigger middle finger of right hand  -     Orthopedic  Referral; Future    Trigger middle finger of left hand  -     Orthopedic  Referral; Future    Hammer toe of left foot      This issue is chronic and Worsening.  Follow up as needed with me, surgical referrals placed today  Hammer toe pad provide which increased comfort, footwear strategies reviewed  Oral Tylenol and topical Voltaren gel reviewed as safe OTC options, reviewed safe dosing strategies  XR images independently visualized and reviewed with patient today in clinic  Rigid 2nd hammer toe, surgical referral placed for interventional options  Also with recurrent triggering in the left 3rd digit, improved currently on the right  Oval-8 brace, activity modification, topical antiinflammatory reviewed  Surgical referral placed to discuss definitive options  We discussed modified progressive pain-free activity as tolerated  Home handouts provided and supportive care reviewed  All questions were answered today  Contact us with additional questions or concerns  Signs and sx of concern reviewed      oJrge Arias DO, PARAMJIT  Sports Medicine Physician  [facility]    Time spent in chart review, one-on-one evaluation, discussion with patient regarding: nature of problem, clinical course, prior treatments, therapeutic options, shared-decision making, potential procedures and referrals, and charting related to the visit: 32 minutes.  If  applicable, time does not include time spent performing any procedure.    -----  Chief Complaint   Patient presents with     Left Foot - Pain       SUBJECTIVE  Camelia Bangura is a/an 80 year old female who is seen as a self referral for evaluation of left great toe/second toe pain. Left middle finger trigger referral request.     The patient is seen by themselves.      Onset: 2 month(s) ago. Reports insidious onset without acute precipitating event.  Location of Pain: left great/second toe pain  Worsened by:   Better with: Any sort of running shoe  Treatments tried: Tylenol, Aleve, walking, wears HOKAs  Associated symptoms: none    Orthopedic/Surgical history: Bunionectomy in the 1990's  Social History/Occupation: [employer]      REVIEW OF SYSTEMS:  Review of Systems    OBJECTIVE:  There were no vitals taken for this visit.   General: healthy, alert and in no distress  Skin: no suspicious lesions or rash.  CV: distal perfusion intact   Resp: normal respiratory effort without conversational dyspnea   Psych: normal mood and affect  Gait: NORMAL  Neuro: Normal light sensory exam of lower extremity bilaterally    Bilateral Wrist and Hand exam    Inspection:       No swelling, bruising or deformity left     Tender:       nodular swelling over the A-1 pulley of the 3rd digit(s) left > right     Non Tender:       Remainder of the Wrist and Hand left and right     ROM:       Decreased active and passive ROM of the 3rd MCP, PIP, and DIP with flexion left and right     Strength:       5/5 strength in the muscles of the hand, wrist and forearm left and right     Neurovascular:       2+ radial pulses bilaterally with brisk capillary refill and      normal sensation to light touch in the radial, median and ulnar nerve distributions     Left foot exam  Mild functional pes planus, 1st MTP joint osteophytes, TTP 2nd MTP joint, rigid flexion deformity of 2nd PIP, with small nodules noted, no significant  TTP of the proximal plantar fascia      RADIOLOGY:  Final results and radiologist's interpretation, available in the [facility] health record.  Images were reviewed with the patient in the office today.  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        Sincerely,        Jorge Arias, DO    Electronically signed

## 2025-07-31 NOTE — PROGRESS NOTES
Camelia Bangura  :  1945  DOS: 2025  MRN: [id number]    ASSESSMENT & PLAN    Camelia was seen today for pain.    Diagnoses and all orders for this visit:    Pain in left toe(s)  -     XR Foot LT G/E 3 vw; Future    Trigger middle finger of right hand  -     Orthopedic  Referral; Future    Trigger middle finger of left hand  -     Orthopedic  Referral; Future    Hammer toe of left foot      This issue is chronic and Worsening.  Follow up as needed with me, surgical referrals placed today  Hammer toe pad provide which increased comfort, footwear strategies reviewed  Oral Tylenol and topical Voltaren gel reviewed as safe OTC options, reviewed safe dosing strategies  XR images independently visualized and reviewed with patient today in clinic  Rigid 2nd hammer toe, surgical referral placed for interventional options  Also with recurrent triggering in the left 3rd digit, improved currently on the right  Oval-8 brace, activity modification, topical antiinflammatory reviewed  Surgical referral placed to discuss definitive options  We discussed modified progressive pain-free activity as tolerated  Home handouts provided and supportive care reviewed  All questions were answered today  Contact us with additional questions or concerns  Signs and sx of concern reviewed      Jorge Arias DO, PARAMJIT  Sports Medicine Physician  [facility]    Time spent in chart review, one-on-one evaluation, discussion with patient regarding: nature of problem, clinical course, prior treatments, therapeutic options, shared-decision making, potential procedures and referrals, and charting related to the visit: 32 minutes.  If applicable, time does not include time spent performing any procedure.    -----  Chief Complaint   Patient presents with    Left Foot - Pain       SUBJECTIVE  Camelia Bangura is a/an 80 year old female who is seen as a self referral for evaluation of  left great toe/second toe pain. Left middle finger trigger referral request.     The patient is seen by themselves.      Onset: 2 month(s) ago. Reports insidious onset without acute precipitating event.  Location of Pain: left great/second toe pain  Worsened by:   Better with: Any sort of running shoe  Treatments tried: Tylenol, Aleve, walking, wears HOKAs  Associated symptoms: none    Orthopedic/Surgical history: Bunionectomy in the 1990's  Social History/Occupation: [employer]      REVIEW OF SYSTEMS:  Review of Systems    OBJECTIVE:  There were no vitals taken for this visit.   General: healthy, alert and in no distress  Skin: no suspicious lesions or rash.  CV: distal perfusion intact   Resp: normal respiratory effort without conversational dyspnea   Psych: normal mood and affect  Gait: NORMAL  Neuro: Normal light sensory exam of lower extremity bilaterally    Bilateral Wrist and Hand exam    Inspection:       No swelling, bruising or deformity left     Tender:       nodular swelling over the A-1 pulley of the 3rd digit(s) left > right     Non Tender:       Remainder of the Wrist and Hand left and right     ROM:       Decreased active and passive ROM of the 3rd MCP, PIP, and DIP with flexion left and right     Strength:       5/5 strength in the muscles of the hand, wrist and forearm left and right     Neurovascular:       2+ radial pulses bilaterally with brisk capillary refill and      normal sensation to light touch in the radial, median and ulnar nerve distributions     Left foot exam  Mild functional pes planus, 1st MTP joint osteophytes, TTP 2nd MTP joint, rigid flexion deformity of 2nd PIP, with small nodules noted, no significant TTP of the proximal plantar fascia      RADIOLOGY:  Final results and radiologist's interpretation, available in the [facility] health record.  Images were reviewed with the patient in the office today.  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

## 2025-08-05 ENCOUNTER — OFFICE VISIT (OUTPATIENT)
Dept: FAMILY MEDICINE | Facility: CLINIC | Age: 80
End: 2025-08-05
Payer: COMMERCIAL

## 2025-08-05 VITALS
RESPIRATION RATE: 14 BRPM | SYSTOLIC BLOOD PRESSURE: 122 MMHG | OXYGEN SATURATION: 98 % | DIASTOLIC BLOOD PRESSURE: 74 MMHG | HEART RATE: 76 BPM

## 2025-08-05 DIAGNOSIS — H10.9 CONJUNCTIVITIS OF BOTH EYES, UNSPECIFIED CONJUNCTIVITIS TYPE: ICD-10-CM

## 2025-08-05 DIAGNOSIS — J45.901 ALLERGIC BRONCHITIS WITH ACUTE EXACERBATION: Primary | ICD-10-CM

## 2025-08-05 PROCEDURE — 99214 OFFICE O/P EST MOD 30 MIN: CPT | Performed by: INTERNAL MEDICINE

## 2025-08-05 PROCEDURE — 3074F SYST BP LT 130 MM HG: CPT | Performed by: INTERNAL MEDICINE

## 2025-08-05 PROCEDURE — 1126F AMNT PAIN NOTED NONE PRSNT: CPT | Performed by: INTERNAL MEDICINE

## 2025-08-05 PROCEDURE — 3078F DIAST BP <80 MM HG: CPT | Performed by: INTERNAL MEDICINE

## 2025-08-05 RX ORDER — AZITHROMYCIN 250 MG/1
TABLET, FILM COATED ORAL
Qty: 6 TABLET | Refills: 0 | Status: SHIPPED | OUTPATIENT
Start: 2025-08-05

## 2025-08-05 RX ORDER — OFLOXACIN 3 MG/ML
2 SOLUTION/ DROPS OPHTHALMIC 4 TIMES DAILY
Qty: 10 ML | Refills: 0 | Status: SHIPPED | OUTPATIENT
Start: 2025-08-05

## 2025-08-05 RX ORDER — PREDNISONE 10 MG/1
TABLET ORAL
Qty: 20 TABLET | Refills: 0 | Status: SHIPPED | OUTPATIENT
Start: 2025-08-05

## 2025-08-05 ASSESSMENT — PAIN SCALES - GENERAL: PAINLEVEL_OUTOF10: NO PAIN (0)

## 2025-08-05 ASSESSMENT — ENCOUNTER SYMPTOMS: EYE PAIN: 1

## 2025-08-11 ENCOUNTER — TELEPHONE (OUTPATIENT)
Dept: ORTHOPEDICS | Facility: CLINIC | Age: 80
End: 2025-08-11

## 2025-08-11 ENCOUNTER — OFFICE VISIT (OUTPATIENT)
Dept: ORTHOPEDICS | Facility: CLINIC | Age: 80
End: 2025-08-11
Attending: FAMILY MEDICINE
Payer: COMMERCIAL

## 2025-08-11 VITALS — HEIGHT: 58 IN | BODY MASS INDEX: 30.93 KG/M2 | RESPIRATION RATE: 18 BRPM

## 2025-08-11 DIAGNOSIS — M65.332 TRIGGER MIDDLE FINGER OF LEFT HAND: ICD-10-CM

## 2025-08-11 DIAGNOSIS — M65.331 TRIGGER MIDDLE FINGER OF RIGHT HAND: ICD-10-CM

## 2025-08-11 DIAGNOSIS — G47.00 INSOMNIA, UNSPECIFIED TYPE: ICD-10-CM

## 2025-08-11 PROCEDURE — 99203 OFFICE O/P NEW LOW 30 MIN: CPT | Performed by: ORTHOPAEDIC SURGERY

## 2025-08-13 RX ORDER — TRAZODONE HYDROCHLORIDE 50 MG/1
25 TABLET ORAL AT BEDTIME
Qty: 45 TABLET | Refills: 0 | Status: SHIPPED | OUTPATIENT
Start: 2025-08-13

## 2025-08-18 ENCOUNTER — OFFICE VISIT (OUTPATIENT)
Dept: FAMILY MEDICINE | Facility: CLINIC | Age: 80
End: 2025-08-18
Payer: COMMERCIAL

## 2025-08-18 VITALS
SYSTOLIC BLOOD PRESSURE: 126 MMHG | HEART RATE: 82 BPM | DIASTOLIC BLOOD PRESSURE: 76 MMHG | WEIGHT: 150 LBS | TEMPERATURE: 98 F | OXYGEN SATURATION: 98 % | HEIGHT: 60 IN | BODY MASS INDEX: 29.45 KG/M2 | RESPIRATION RATE: 12 BRPM

## 2025-08-18 DIAGNOSIS — M81.6 LOCALIZED OSTEOPOROSIS, UNSPECIFIED PATHOLOGICAL FRACTURE PRESENCE: ICD-10-CM

## 2025-08-18 DIAGNOSIS — Z00.01 ENCOUNTER FOR GENERAL ADULT MEDICAL EXAMINATION WITH ABNORMAL FINDINGS: Primary | ICD-10-CM

## 2025-08-18 DIAGNOSIS — Z78.0 ASYMPTOMATIC POSTMENOPAUSAL STATUS: ICD-10-CM

## 2025-08-18 DIAGNOSIS — E78.5 HYPERLIPIDEMIA LDL GOAL <130: ICD-10-CM

## 2025-08-18 LAB
ERYTHROCYTE [DISTWIDTH] IN BLOOD BY AUTOMATED COUNT: 13.4 % (ref 10–15)
HCT VFR BLD AUTO: 37.9 % (ref 35–47)
HGB BLD-MCNC: 12.6 G/DL (ref 11.7–15.7)
MCH RBC QN AUTO: 30.5 PG (ref 26.5–33)
MCHC RBC AUTO-ENTMCNC: 33.2 G/DL (ref 31.5–36.5)
MCV RBC AUTO: 91.8 FL (ref 78–100)
PLATELET # BLD AUTO: 263 10E3/UL (ref 150–450)
RBC # BLD AUTO: 4.13 10E6/UL (ref 3.8–5.2)
WBC # BLD AUTO: 9.67 10E3/UL (ref 4–11)

## 2025-08-18 SDOH — HEALTH STABILITY: PHYSICAL HEALTH: ON AVERAGE, HOW MANY MINUTES DO YOU ENGAGE IN EXERCISE AT THIS LEVEL?: 30 MIN

## 2025-08-18 SDOH — HEALTH STABILITY: PHYSICAL HEALTH: ON AVERAGE, HOW MANY DAYS PER WEEK DO YOU ENGAGE IN MODERATE TO STRENUOUS EXERCISE (LIKE A BRISK WALK)?: 7 DAYS

## 2025-08-18 ASSESSMENT — ASTHMA QUESTIONNAIRES
QUESTION_5 LAST FOUR WEEKS HOW WOULD YOU RATE YOUR ASTHMA CONTROL: COMPLETELY CONTROLLED
ACT_TOTALSCORE: 25
QUESTION_2 LAST FOUR WEEKS HOW OFTEN HAVE YOU HAD SHORTNESS OF BREATH: NOT AT ALL
ACT_TOTALSCORE: 25
QUESTION_4 LAST FOUR WEEKS HOW OFTEN HAVE YOU USED YOUR RESCUE INHALER OR NEBULIZER MEDICATION (SUCH AS ALBUTEROL): NOT AT ALL
QUESTION_3 LAST FOUR WEEKS HOW OFTEN DID YOUR ASTHMA SYMPTOMS (WHEEZING, COUGHING, SHORTNESS OF BREATH, CHEST TIGHTNESS OR PAIN) WAKE YOU UP AT NIGHT OR EARLIER THAN USUAL IN THE MORNING: NOT AT ALL
QUESTION_1 LAST FOUR WEEKS HOW MUCH OF THE TIME DID YOUR ASTHMA KEEP YOU FROM GETTING AS MUCH DONE AT WORK, SCHOOL OR AT HOME: NONE OF THE TIME

## 2025-08-18 ASSESSMENT — PATIENT HEALTH QUESTIONNAIRE - PHQ9
SUM OF ALL RESPONSES TO PHQ QUESTIONS 1-9: 0
SUM OF ALL RESPONSES TO PHQ QUESTIONS 1-9: 0

## 2025-08-18 ASSESSMENT — PAIN SCALES - GENERAL: PAINLEVEL_OUTOF10: NO PAIN (0)

## 2025-08-18 ASSESSMENT — SOCIAL DETERMINANTS OF HEALTH (SDOH): HOW OFTEN DO YOU GET TOGETHER WITH FRIENDS OR RELATIVES?: MORE THAN THREE TIMES A WEEK

## 2025-08-19 LAB
ALBUMIN SERPL BCG-MCNC: 4.1 G/DL (ref 3.5–5.2)
ALP SERPL-CCNC: 83 U/L (ref 40–150)
ALT SERPL W P-5'-P-CCNC: 14 U/L (ref 0–50)
ANION GAP SERPL CALCULATED.3IONS-SCNC: 10 MMOL/L (ref 7–15)
AST SERPL W P-5'-P-CCNC: 20 U/L (ref 0–45)
BILIRUB SERPL-MCNC: 0.7 MG/DL
BUN SERPL-MCNC: 14.3 MG/DL (ref 8–23)
CALCIUM SERPL-MCNC: 9.7 MG/DL (ref 8.8–10.4)
CHLORIDE SERPL-SCNC: 103 MMOL/L (ref 98–107)
CHOLEST SERPL-MCNC: 156 MG/DL
CREAT SERPL-MCNC: 0.88 MG/DL (ref 0.51–0.95)
EGFRCR SERPLBLD CKD-EPI 2021: 67 ML/MIN/1.73M2
FASTING STATUS PATIENT QL REPORTED: NO
FASTING STATUS PATIENT QL REPORTED: NO
GLUCOSE SERPL-MCNC: 96 MG/DL (ref 70–99)
HCO3 SERPL-SCNC: 26 MMOL/L (ref 22–29)
HDLC SERPL-MCNC: 57 MG/DL
LDLC SERPL CALC-MCNC: 67 MG/DL
NONHDLC SERPL-MCNC: 99 MG/DL
POTASSIUM SERPL-SCNC: 4.2 MMOL/L (ref 3.4–5.3)
PROT SERPL-MCNC: 6.9 G/DL (ref 6.4–8.3)
SODIUM SERPL-SCNC: 139 MMOL/L (ref 135–145)
TRIGL SERPL-MCNC: 160 MG/DL

## 2025-09-09 ENCOUNTER — PRE VISIT (OUTPATIENT)
Dept: ORTHOPEDICS | Facility: CLINIC | Age: 80
End: 2025-09-09

## (undated) DEVICE — GOWN XLG DISP 9545

## (undated) DEVICE — SUCTION MANIFOLD NEPTUNE 2 SYS 4 PORT 0702-020-000

## (undated) DEVICE — SU STRATAFIX MONOCRYL 3-0 SPIRAL PS-2 30CM SXMP1B106

## (undated) DEVICE — BONE CLEANING TIP INTERPULSE  0210-010-000

## (undated) DEVICE — ESU PENCIL SMOKE EVAC W/ROCKER SWITCH 0703-047-000

## (undated) DEVICE — GLOVE BIOGEL PI MICRO INDICATOR UNDERGLOVE SZ 8.0 48980

## (undated) DEVICE — BLADE SAW SAGITTAL STRK 13X90X1.19MM HD SYS 6 6113-119-090

## (undated) DEVICE — SUTURE ABSORBABLE VICRYL CT-B1 L36 IN BRAID VIOLET JB947H

## (undated) DEVICE — TOURNIQUET SGL  BLADDER 30" DL PORT BLUE 5921-030-235

## (undated) DEVICE — SU MONOCRYL 2-0 CT-1 36" UND Y945H

## (undated) DEVICE — DRSG AQUACEL AG 3.5X9.75" HYDROFIBER 412011

## (undated) DEVICE — GLOVE BIOGEL PI ULTRATOUCH G SZ 6.5 42165

## (undated) DEVICE — DRAPE SHEET REV FOLD 3/4 9349

## (undated) DEVICE — STOCKING SLEEVE COMPRESSION CALF MED

## (undated) DEVICE — SYR 20ML LL W/O NDL

## (undated) DEVICE — GOWN IMPERVIOUS SPECIALTY XLG/XLONG 32474

## (undated) DEVICE — SOL NACL 0.9% IRRIG 1000ML BOTTLE 07138-09

## (undated) DEVICE — BNDG COBAN 6"X5YDS STERILE

## (undated) DEVICE — DECANTER VIAL 2006S

## (undated) DEVICE — BONE CEMENT KIT BOWL AND SPATULA STRK 6201-3-410

## (undated) DEVICE — NDL SPINAL 18GA 3.5" 405184

## (undated) DEVICE — SOL WATER IRRIG 1000ML BOTTLE 07139-09

## (undated) DEVICE — Device

## (undated) DEVICE — SUCTION IRR SYSTEM W/TIP INTERPULSE

## (undated) DEVICE — GLOVE BIOGEL PI ULTRATOUCH G SZ 8.0 42180

## (undated) DEVICE — SOL NACL 0.9% IRRIG 3000ML BAG 07972-08

## (undated) DEVICE — PREP CHLORAPREP 26ML TINTED ORANGE  260815

## (undated) DEVICE — SU PDO 1 STRATAFIX 36X36CM CTX TAPERPOINT SXPD2B405

## (undated) RX ORDER — PROPOFOL 10 MG/ML
INJECTION, EMULSION INTRAVENOUS
Status: DISPENSED
Start: 2023-02-23

## (undated) RX ORDER — TRANEXAMIC ACID 650 MG/1
TABLET ORAL
Status: DISPENSED
Start: 2023-02-23

## (undated) RX ORDER — LIDOCAINE HYDROCHLORIDE 10 MG/ML
INJECTION, SOLUTION EPIDURAL; INFILTRATION; INTRACAUDAL; PERINEURAL
Status: DISPENSED
Start: 2023-02-23

## (undated) RX ORDER — FENTANYL CITRATE 50 UG/ML
INJECTION, SOLUTION INTRAMUSCULAR; INTRAVENOUS
Status: DISPENSED
Start: 2023-02-23

## (undated) RX ORDER — BUPIVACAINE HYDROCHLORIDE 5 MG/ML
INJECTION, SOLUTION EPIDURAL; INTRACAUDAL
Status: DISPENSED
Start: 2023-02-23

## (undated) RX ORDER — ACETAMINOPHEN 325 MG/1
TABLET ORAL
Status: DISPENSED
Start: 2023-02-23

## (undated) RX ORDER — CEFAZOLIN SODIUM/WATER 2 G/20 ML
SYRINGE (ML) INTRAVENOUS
Status: DISPENSED
Start: 2023-02-23

## (undated) RX ORDER — DEXAMETHASONE SODIUM PHOSPHATE 4 MG/ML
INJECTION, SOLUTION INTRA-ARTICULAR; INTRALESIONAL; INTRAMUSCULAR; INTRAVENOUS; SOFT TISSUE
Status: DISPENSED
Start: 2023-02-23

## (undated) RX ORDER — ONDANSETRON 2 MG/ML
INJECTION INTRAMUSCULAR; INTRAVENOUS
Status: DISPENSED
Start: 2023-02-23

## (undated) RX ORDER — TROPICAMIDE 10 MG/ML
SOLUTION/ DROPS OPHTHALMIC
Status: DISPENSED
Start: 2021-07-21

## (undated) RX ORDER — TROPICAMIDE 10 MG/ML
SOLUTION/ DROPS OPHTHALMIC
Status: DISPENSED
Start: 2021-06-21